# Patient Record
Sex: MALE | Race: WHITE | NOT HISPANIC OR LATINO | Employment: OTHER | URBAN - METROPOLITAN AREA
[De-identification: names, ages, dates, MRNs, and addresses within clinical notes are randomized per-mention and may not be internally consistent; named-entity substitution may affect disease eponyms.]

---

## 2017-01-06 ENCOUNTER — ALLSCRIPTS OFFICE VISIT (OUTPATIENT)
Dept: OTHER | Facility: OTHER | Age: 57
End: 2017-01-06

## 2017-08-18 ENCOUNTER — ALLSCRIPTS OFFICE VISIT (OUTPATIENT)
Dept: OTHER | Facility: OTHER | Age: 57
End: 2017-08-18

## 2017-08-18 DIAGNOSIS — R10.9 ABDOMINAL PAIN: ICD-10-CM

## 2017-08-18 DIAGNOSIS — I10 ESSENTIAL (PRIMARY) HYPERTENSION: ICD-10-CM

## 2017-08-21 ENCOUNTER — GENERIC CONVERSION - ENCOUNTER (OUTPATIENT)
Dept: OTHER | Facility: OTHER | Age: 57
End: 2017-08-21

## 2017-08-22 ENCOUNTER — GENERIC CONVERSION - ENCOUNTER (OUTPATIENT)
Dept: OTHER | Facility: OTHER | Age: 57
End: 2017-08-22

## 2017-08-24 ENCOUNTER — ALLSCRIPTS OFFICE VISIT (OUTPATIENT)
Dept: OTHER | Facility: OTHER | Age: 57
End: 2017-08-24

## 2017-08-29 ENCOUNTER — APPOINTMENT (OUTPATIENT)
Dept: LAB | Facility: HOSPITAL | Age: 57
End: 2017-08-29
Payer: COMMERCIAL

## 2017-08-29 ENCOUNTER — GENERIC CONVERSION - ENCOUNTER (OUTPATIENT)
Dept: OTHER | Facility: OTHER | Age: 57
End: 2017-08-29

## 2017-08-29 DIAGNOSIS — I10 ESSENTIAL (PRIMARY) HYPERTENSION: ICD-10-CM

## 2017-08-29 LAB
ALBUMIN SERPL BCP-MCNC: 3.8 G/DL (ref 3.5–5)
ALP SERPL-CCNC: 58 U/L (ref 46–116)
ALT SERPL W P-5'-P-CCNC: 44 U/L (ref 12–78)
ANION GAP SERPL CALCULATED.3IONS-SCNC: 8 MMOL/L (ref 4–13)
AST SERPL W P-5'-P-CCNC: 28 U/L (ref 5–45)
BASOPHILS # BLD AUTO: 0.03 THOUSANDS/ΜL (ref 0–0.1)
BASOPHILS NFR BLD AUTO: 1 % (ref 0–1)
BILIRUB SERPL-MCNC: 0.62 MG/DL (ref 0.2–1)
BUN SERPL-MCNC: 23 MG/DL (ref 5–25)
CALCIUM SERPL-MCNC: 8.5 MG/DL (ref 8.3–10.1)
CHLORIDE SERPL-SCNC: 104 MMOL/L (ref 100–108)
CHOLEST SERPL-MCNC: 206 MG/DL (ref 50–200)
CO2 SERPL-SCNC: 32 MMOL/L (ref 21–32)
CREAT SERPL-MCNC: 1.16 MG/DL (ref 0.6–1.3)
EOSINOPHIL # BLD AUTO: 0.13 THOUSAND/ΜL (ref 0–0.61)
EOSINOPHIL NFR BLD AUTO: 2 % (ref 0–6)
ERYTHROCYTE [DISTWIDTH] IN BLOOD BY AUTOMATED COUNT: 13.4 % (ref 11.6–15.1)
GFR SERPL CREATININE-BSD FRML MDRD: 69 ML/MIN/1.73SQ M
GLUCOSE P FAST SERPL-MCNC: 99 MG/DL (ref 65–99)
HCT VFR BLD AUTO: 45.1 % (ref 36.5–49.3)
HDLC SERPL-MCNC: 61 MG/DL (ref 40–60)
HGB BLD-MCNC: 15.6 G/DL (ref 12–17)
LDLC SERPL CALC-MCNC: 137 MG/DL (ref 0–100)
LYMPHOCYTES # BLD AUTO: 1.34 THOUSANDS/ΜL (ref 0.6–4.47)
LYMPHOCYTES NFR BLD AUTO: 20 % (ref 14–44)
MCH RBC QN AUTO: 31.8 PG (ref 26.8–34.3)
MCHC RBC AUTO-ENTMCNC: 34.6 G/DL (ref 31.4–37.4)
MCV RBC AUTO: 92 FL (ref 82–98)
MONOCYTES # BLD AUTO: 0.5 THOUSAND/ΜL (ref 0.17–1.22)
MONOCYTES NFR BLD AUTO: 8 % (ref 4–12)
NEUTROPHILS # BLD AUTO: 4.62 THOUSANDS/ΜL (ref 1.85–7.62)
NEUTS SEG NFR BLD AUTO: 69 % (ref 43–75)
NRBC BLD AUTO-RTO: 0 /100 WBCS
PLATELET # BLD AUTO: 173 THOUSANDS/UL (ref 149–390)
PMV BLD AUTO: 10.5 FL (ref 8.9–12.7)
POTASSIUM SERPL-SCNC: 4.5 MMOL/L (ref 3.5–5.3)
PROT SERPL-MCNC: 7.6 G/DL (ref 6.4–8.2)
RBC # BLD AUTO: 4.91 MILLION/UL (ref 3.88–5.62)
SODIUM SERPL-SCNC: 144 MMOL/L (ref 136–145)
TRIGL SERPL-MCNC: 40 MG/DL
TSH SERPL DL<=0.05 MIU/L-ACNC: 0.77 UIU/ML (ref 0.36–3.74)
WBC # BLD AUTO: 6.62 THOUSAND/UL (ref 4.31–10.16)

## 2017-08-29 PROCEDURE — 85025 COMPLETE CBC W/AUTO DIFF WBC: CPT

## 2017-08-29 PROCEDURE — 84443 ASSAY THYROID STIM HORMONE: CPT

## 2017-08-29 PROCEDURE — 80061 LIPID PANEL: CPT

## 2017-08-29 PROCEDURE — 36415 COLL VENOUS BLD VENIPUNCTURE: CPT

## 2017-08-29 PROCEDURE — 80053 COMPREHEN METABOLIC PANEL: CPT

## 2017-09-22 ENCOUNTER — GENERIC CONVERSION - ENCOUNTER (OUTPATIENT)
Dept: OTHER | Facility: OTHER | Age: 57
End: 2017-09-22

## 2017-09-22 DIAGNOSIS — R10.13 EPIGASTRIC PAIN: ICD-10-CM

## 2017-09-22 DIAGNOSIS — R10.9 ABDOMINAL PAIN: ICD-10-CM

## 2017-09-27 ENCOUNTER — ALLSCRIPTS OFFICE VISIT (OUTPATIENT)
Dept: OTHER | Facility: OTHER | Age: 57
End: 2017-09-27

## 2017-10-13 ENCOUNTER — GENERIC CONVERSION - ENCOUNTER (OUTPATIENT)
Dept: OTHER | Facility: OTHER | Age: 57
End: 2017-10-13

## 2017-10-13 ENCOUNTER — GENERIC CONVERSION - ENCOUNTER (OUTPATIENT)
Dept: FAMILY MEDICINE CLINIC | Facility: CLINIC | Age: 57
End: 2017-10-13

## 2017-10-27 NOTE — CONSULTS
Assessment  1  Family history of malignant neoplasm of stomach (V16 0) (Z80 0) : Mother   2  History of Dyspepsia (536 8) (R10 13)   3  Gallbladder polyp (575 6) (K82 4)   4  Colon cancer screening (V76 51) (Z12 11)   5  Dyspepsia (536 8) (R10 13)    Plan  Abdominal pain    · * US ABDOMEN COMPLETE; Status:Active; Requested for:26Jjt8507;    Perform:[facility]; CSI:15VLL6107; Last Updated By:Bayron Snyder; 2017 8:58:53 AM;Ordered; For:Abdominal pain; Ordered By:Sabrina Lujan; Abdominal pain, Dyspepsia    · Follow-up visit in 3 months Evaluation and Treatment  Follow-up  Status: Complete   Done: 14LWV3349   Ordered; For: Abdominal pain, Dyspepsia; Ordered By: Parvin Treviño Performed:  Due: 56NEO1083; Last Updated By: Caty Sol; 2017 9:10:15 AM  Abdominal pain, PMH: Dyspepsia    · (1) HELICOBACTER PYLORI ANTIGEN, STOOL; Status:Active; Requested  UBM:52JMR6451;    Perform:[facility]; CWL:25RGF8340; Ordered; For:Abdominal pain, PMH: Dyspepsia; Ordered By:Josh Hairston; Dyspepsia    · Dicyclomine HCl - 20 MG Oral Tablet; TAKE 1 TABLET EVERY 6 HOURS AS  NEEDED   Rx By: Ran Pak; Dispense: 30 Days ; #:120 Tablet; Refill: 5;For: Dyspepsia; VLAD = N; Verified Transmission to [facility] #0058 PMH: Dyspepsia    · (1) IGA; Status:Active; Requested TPW:33EOD5126;    Perform:[facility]; [id number]; Ordered;  West : Dyspepsia; Ordered By:Josh Hairston;   · (1) TISSUE TRANSGLUTAMINASE IGA; Status:Active; Requested AVJ:60PTZ3939;    Perform:[facility]; PFP:75LXF4572; Ordered;  West  St: Dyspepsia; Ordered By:Josh Hairston;     Discussion/Summary  Discussion Summary:   61year old male referred by his PCP  Viral gastroenteritis- most likely given symptoms resolved on their ownDyspepsia- without alarm symptoms, will check h pylori status as well as TTG and IgAColon Cancer screening - uptodate; repeat colonoscopy in Gallbladder polyp- repeat US in one year, if stability, then likely benign and no further workup neededProbable Gilbert's syndrome- which is benign  up in a few months time      Chief Complaint  Chief Complaint Free Text Note Form: Colon consult  Pt states no active symptoms  Referred by Dr Shanta Peguero  History of Present Illness  HPI: 61year old male referred by PCP  recently moved to a new place and reports a cough that progressed into explosive diarrhea with 4-5 times a day associated with blisters at the back of his tongue  any blood in his stools  to these symptoms at this new place, he moved out of this place to a new apartment  now says he has some central abdominal pain and fullness  Not really related to food  Happens while driving  used to race horses and feels like it   really having GERD symptoms  He is taking his zantac religiously  usually has a 1-2 BM's daily  does have a have a history of Bladder Cancer s/p resection  Review of Systems  Complete-Male GI Adult:   Constitutional: No fever or chills, feels well, no tiredness, no recent weight gain or weight loss  Eyes: No complaints of eye pain, no red eyes, no discharge from eyes, no itchy eyes  ENT: no complaints of earache, no hearing loss, no nosebleeds, no nasal discharge, no sore throat, no hoarseness  Cardiovascular: No complaints of slow heart rate, no fast heart rate, no chest pain, no palpitations, no leg claudication, no lower extremity  Respiratory: No complaints of shortness of breath, no wheezing, no cough, no SOB on exertion, no orthopnea or PND  Gastrointestinal: as noted in HPI  Genitourinary: No complaints of dysuria, no incontinence, no hesitancy, no nocturia, no genital lesion, no testicular pain  Musculoskeletal: No complaints of arthralgia, no myalgias, no joint swelling or stiffness, no limb pain or swelling  Integumentary: No complaints of skin rash or skin lesions, no itching, no skin wound, no dry skin     Neurological: No compliants of headache, no confusion, no convulsions, no numbness or tingling, no dizziness or fainting, no limb weakness, no difficulty walking  Psychiatric: Is not suicidal, no sleep disturbances, no anxiety or depression, no change in personality, no emotional problems  Endocrine: No complaints of proptosis, no hot flashes, no muscle weakness, no erectile dysfunction, no deepening of the voice, no feelings of weakness  Hematologic/Lymphatic: No complaints of swollen glands, no swollen glands in the neck, does not bleed easily, no easy bruising  ROS Reviewed:   ROS reviewed  Active Problems  1  Abdominal pain (789 00) (R10 9)   2  Actinic keratosis (702 0) (L57 0)   3  Arthritis (716 90) (M19 90)   4  Benign nodular prostatic hyperplasia with lower urinary tract symptoms (600 10) (N40 1)   5  Bilirubinemia (782 4) (E80 6)   6  Bladder cancer (188 9) (C67 9)   7  Chest pain (786 50) (R07 9)   8  Cough (786 2) (R05)   9  Diarrhea (787 91) (R19 7)   10  Encounter for colorectal cancer screening (V76 51) (Z12 11,Z12 12)   11  Footdrop (736 79) (M21 379)   12  Glossitis (529 0) (K14 0)   13  Hypertension (401 9) (I10)   14  Impacted cerumen of left ear (380 4) (H61 22)   15  Left non-suppurative otitis media (381 4) (H65 92)    Past Medical History  1  History of Dyspepsia (536 8) (R10 13)  Active Problems And Past Medical History Reviewed: The active problems and past medical history were reviewed and updated today  Surgical History  1  History of Back Surgery   2  History of Bladder Surgery  Surgical History Reviewed: The surgical history was reviewed and updated today  Family History  Mother    1  Family history of malignant neoplasm of stomach (V16 0) (Z80 0)   2  Family history of Raynaud phenomenon  Father    3  Family history of Heart attack (410 90) (I21 3)  Brother    4  Family history of Heart attack (410 90) (I21 3)  Family History Reviewed: The family history was reviewed and updated today  Social History   · Always uses seat belt   ·    · Employed   · Full-time employment   · Has 1 child   · Denominational   · Lives in apartment   · Living alone (V60 3) (Z60 2)   · Never a smoker   · No advance directives (V49 89) (Z78 9)   · No alcohol use   · No history of alcohol use (Z78 9)   · No living will   · One child   · Single   · Supportive and safe  Social History Reviewed: The social history was reviewed and updated today  Current Meds   1  AmLODIPine Besylate 2 5 MG Oral Tablet; TAKE 1 TABLET DAILY; Therapy: 78AGV8462 to (Evaluate:84Kdh6761)  Requested for: 45YNL2007; Last   Rx:86Bks0635 Ordered   2  Flomax 0 4 MG Oral Capsule; take 1 capsule daily Recorded   3  RaNITidine HCl - 150 MG Oral Tablet; take 1 tablet by mouth twice a day; Therapy: 60UHF4805 to 077 0252 9144)  Requested for: 19HDW8569; Last   Rx:31Evp0786 Ordered  Medication List Reviewed: The medication list was reviewed and updated today  Allergies  1  No Known Drug Allergies    Vitals  Vital Signs    Recorded: 10YMW5789 07:53AM   Temperature 96 4 F, Tympanic   Heart Rate 67   Systolic 433, LUE, Sitting   Diastolic 82, LUE, Sitting   Height 6 ft 3 in   Weight 186 lb    BMI Calculated 23 25   BSA Calculated 2 13   O2 Saturation 99, RA     Physical Exam    Constitutional   General appearance: No acute distress, well appearing and well nourished  Eyes   Conjunctiva and lids: No swelling, erythema, or discharge  Pupils and irises: Equal, round and reactive to light  Ears, Nose, Mouth, and Throat   External inspection of ears and nose: Normal     Otoscopic examination: Tympanic membrance translucent with normal light reflex  Canals patent without erythema  Nasal mucosa, septum, and turbinates: Normal without edema or erythema  Oropharynx: Normal with no erythema, edema, exudate or lesions  Pulmonary   Respiratory effort: No increased work of breathing or signs of respiratory distress      Auscultation of lungs: Clear to auscultation, equal breath sounds bilaterally, no wheezes, no rales, no rhonci  Cardiovascular   Palpation of heart: Normal PMI, no thrills  Auscultation of heart: Normal rate and rhythm, normal S1 and S2, without murmurs  Examination of extremities for edema and/or varicosities: Normal     Carotid pulses: Normal     Abdomen   Abdomen: Non-tender, no masses  Liver and spleen: No hepatomegaly or splenomegaly  Lymphatic   Palpation of lymph nodes in neck: No lymphadenopathy  Musculoskeletal   Gait and station: Normal     Digits and nails: Normal without clubbing or cyanosis  Inspection/palpation of joints, bones, and muscles: Normal     Skin   Skin and subcutaneous tissue: Normal without rashes or lesions  Neurologic   Cranial nerves: Cranial nerves 2-12 intact  Reflexes: 2+ and symmetric  Sensation: No sensory loss      Psychiatric   Orientation to person, place and time: Normal     Mood and affect: Normal          Future Appointments    Date/Time Provider Specialty Site   12/07/2017 08:00 AM Nydia aSlgado [facility]   11/20/2017 04:15 PM Gumaro Ojeda MD Family Medicine [facility] [address]     Signatures   Electronically signed by : Pancho Naidu DO; Sep 27 2017 12:47PM EST                       (Author)

## 2018-01-12 VITALS
WEIGHT: 186 LBS | HEART RATE: 67 BPM | TEMPERATURE: 96.4 F | BODY MASS INDEX: 23.13 KG/M2 | OXYGEN SATURATION: 99 % | DIASTOLIC BLOOD PRESSURE: 82 MMHG | SYSTOLIC BLOOD PRESSURE: 128 MMHG | HEIGHT: 75 IN

## 2018-01-13 VITALS
BODY MASS INDEX: 21.69 KG/M2 | WEIGHT: 174.4 LBS | HEIGHT: 75 IN | SYSTOLIC BLOOD PRESSURE: 142 MMHG | DIASTOLIC BLOOD PRESSURE: 90 MMHG

## 2018-01-14 VITALS
TEMPERATURE: 97.6 F | DIASTOLIC BLOOD PRESSURE: 98 MMHG | HEIGHT: 75 IN | SYSTOLIC BLOOD PRESSURE: 150 MMHG | BODY MASS INDEX: 23.05 KG/M2 | WEIGHT: 185.38 LBS | RESPIRATION RATE: 16 BRPM | HEART RATE: 88 BPM

## 2018-01-15 VITALS
HEIGHT: 75 IN | SYSTOLIC BLOOD PRESSURE: 150 MMHG | TEMPERATURE: 98.2 F | WEIGHT: 184.38 LBS | DIASTOLIC BLOOD PRESSURE: 86 MMHG | HEART RATE: 76 BPM | RESPIRATION RATE: 18 BRPM | BODY MASS INDEX: 22.92 KG/M2 | OXYGEN SATURATION: 96 %

## 2018-01-18 NOTE — RESULT NOTES
Verified Results  (1) CBC/PLT/DIFF 61Dmm6664 08:04AM Amauri Guidry    Order Number: [id number]     Test Name Result Flag Reference   WBC COUNT 6 62 Thousand/uL  4 31-10 16   RBC COUNT 4 91 Million/uL  3 88-5 62   HEMOGLOBIN 15 6 g/dL  12 0-17 0   HEMATOCRIT 45 1 %  36 5-49 3   MCV 92 fL  82-98   MCH 31 8 pg  26 8-34 3   MCHC 34 6 g/dL  31 4-37 4   RDW 13 4 %  11 6-15 1   MPV 10 5 fL  8 9-12 7   PLATELET COUNT 948 Thousands/uL  149-390   nRBC AUTOMATED 0 /100 WBCs     NEUTROPHILS RELATIVE PERCENT 69 %  43-75   LYMPHOCYTES RELATIVE PERCENT 20 %  14-44   MONOCYTES RELATIVE PERCENT 8 %  4-12   EOSINOPHILS RELATIVE PERCENT 2 %  0-6   BASOPHILS RELATIVE PERCENT 1 %  0-1   NEUTROPHILS ABSOLUTE COUNT 4 62 Thousands/? ??L  1 85-7 62   LYMPHOCYTES ABSOLUTE COUNT 1 34 Thousands/? ??L  0 60-4 47   MONOCYTES ABSOLUTE COUNT 0 50 Thousand/? ??L  0 17-1 22   EOSINOPHILS ABSOLUTE COUNT 0 13 Thousand/? ??L  0 00-0 61   BASOPHILS ABSOLUTE COUNT 0 03 Thousands/? ??L  0 00-0 10     (1) COMPREHENSIVE METABOLIC PANEL 55VQA0502 76:51JP Amauri Guidry    Order Number: [id number]     Test Name Result Flag Reference   SODIUM 144 mmol/L  136-145   POTASSIUM 4 5 mmol/L  3 5-5 3   CHLORIDE 104 mmol/L  100-108   CARBON DIOXIDE 32 mmol/L  21-32   ANION GAP (CALC) 8 mmol/L  4-13   BLOOD UREA NITROGEN 23 mg/dL  5-25   CREATININE 1 16 mg/dL  0 60-1 30   Standardized to IDMS reference method   CALCIUM 8 5 mg/dL  8 3-10 1   BILI, TOTAL 0 62 mg/dL  0 20-1 00   ALK PHOSPHATAS 58 U/L     ALT (SGPT) 44 U/L  12-78   Specimen collection should occur prior to Sulfasalazine administration due to the potential for falsely depressed results  AST(SGOT) 28 U/L  5-45   Specimen collection should occur prior to Sulfasalazine administration due to the potential for falsely depressed results     ALBUMIN 3 8 g/dL  3 5-5 0   TOTAL PROTEIN 7 6 g/dL  6 4-8 2   eGFR 69 ml/min/1 73sq m     [employer] Education [employer] recommendations are as follows:  GFR calculation is accurate only with a steady state creatinine  Chronic Kidney disease less than 60 ml/min/1 73 sq  meters  Kidney failure less than 15 ml/min/1 73 sq  meters  GLUCOSE FASTING 99 mg/dL  65-99   Specimen collection should occur prior to Sulfasalazine administration due to the potential for falsely depressed results  Specimen collection should occur prior to Sulfapyridine administration due to the potential for falsely elevated results  (1) LIPID PANEL FASTING W DIRECT LDL REFLEX 15Mrz7849 08:04AM Rebekah Careerflo   TW Order Number: [id number]     Test Name Result Flag Reference   CHOLESTEROL 206 mg/dL H    LDL CHOLESTEROL CALCULATED 137 mg/dL H 0-100   Triglyceride:        Normal ??? ??? ??? ??? ??? ??? ??? <150 mg/dl   ??? ??? ???Borderline High ??? ??? 150-199 mg/dl   ??? ??? ? ?? High ??? ??? ??? ??? ??? ??? ??? 200-499 mg/dl   ??? ??? ? ??Very High ??? ??? ??? ??? ??? >499 mg/dl      Cholesterol:       Desirable ??? ??? ??? ??? <200 mg/dl   ??? ??? Borderline High ??? 200-239 mg/dl   ??? ??? High ??? ??? ??? ??? ??? ??? >239 mg/dl      HDL Cholesterol:       High ??? ???>59 mg/dL   ??? ??? Low ??? ??? <41 mg/dL      HDL Cholesterol:       High ??? ???>59 mg/dL   ??? ??? Low ??? ??? <41 mg/dL      This screening LDL is a calculated result  It does not have the accuracy of the Direct Measured LDL in the monitoring of patients with hyperlipidemia and/or statin therapy  Direct Measure LDL (BFR969) must be ordered separately in these patients  TRIGLYCERIDES 40 mg/dL  <=150   Specimen collection should occur prior to N-Acetylcysteine or Metamizole administration due to the potential for falsely depressed results  HDL,DIRECT 61 mg/dL H 40-60   Specimen collection should occur prior to Metamizole administration due to the potential for falsley depressed results       (1) TSH 27Mfa6806 08:04AM Lopez Maxim Order Number: [id number]     Test Name Result Flag Reference   TSH 0 768 uIU/mL  0 358-3 740   Patients undergoing fluorescein dye angiography may retain small amounts of fluorescein in the body for 48-72 hours post procedure  Samples containing fluorescein can produce falsely depressed TSH values  If the patient had this procedure,a specimen should be resubmitted post fluorescein clearance

## 2018-01-22 VITALS
HEART RATE: 66 BPM | RESPIRATION RATE: 18 BRPM | SYSTOLIC BLOOD PRESSURE: 148 MMHG | WEIGHT: 186.5 LBS | TEMPERATURE: 97.9 F | DIASTOLIC BLOOD PRESSURE: 98 MMHG | HEIGHT: 75 IN | BODY MASS INDEX: 23.19 KG/M2

## 2018-01-22 VITALS
WEIGHT: 188.38 LBS | RESPIRATION RATE: 16 BRPM | BODY MASS INDEX: 23.42 KG/M2 | DIASTOLIC BLOOD PRESSURE: 90 MMHG | HEIGHT: 75 IN | TEMPERATURE: 97.6 F | HEART RATE: 78 BPM | SYSTOLIC BLOOD PRESSURE: 142 MMHG

## 2018-01-22 VITALS — DIASTOLIC BLOOD PRESSURE: 84 MMHG | SYSTOLIC BLOOD PRESSURE: 134 MMHG

## 2018-04-03 ENCOUNTER — TELEPHONE (OUTPATIENT)
Dept: FAMILY MEDICINE CLINIC | Facility: CLINIC | Age: 58
End: 2018-04-03

## 2018-04-03 DIAGNOSIS — I10 ESSENTIAL HYPERTENSION: Primary | ICD-10-CM

## 2018-04-03 RX ORDER — AMLODIPINE BESYLATE 2.5 MG/1
1 TABLET ORAL DAILY
COMMUNITY
Start: 2017-08-18 | End: 2018-04-03 | Stop reason: SDUPTHER

## 2018-04-03 RX ORDER — AMLODIPINE BESYLATE 2.5 MG/1
2.5 TABLET ORAL DAILY
Qty: 30 TABLET | Refills: 0 | Status: SHIPPED | OUTPATIENT
Start: 2018-04-03 | End: 2018-05-03 | Stop reason: SDUPTHER

## 2018-04-06 ENCOUNTER — OFFICE VISIT (OUTPATIENT)
Dept: FAMILY MEDICINE CLINIC | Facility: CLINIC | Age: 58
End: 2018-04-06

## 2018-04-06 VITALS
DIASTOLIC BLOOD PRESSURE: 98 MMHG | RESPIRATION RATE: 20 BRPM | WEIGHT: 190.38 LBS | HEART RATE: 72 BPM | HEIGHT: 73 IN | SYSTOLIC BLOOD PRESSURE: 150 MMHG | TEMPERATURE: 98.1 F | BODY MASS INDEX: 25.23 KG/M2

## 2018-04-06 DIAGNOSIS — I10 ESSENTIAL HYPERTENSION: Primary | ICD-10-CM

## 2018-04-06 RX ORDER — TAMSULOSIN HYDROCHLORIDE 0.4 MG/1
1 CAPSULE ORAL DAILY
COMMUNITY
End: 2022-06-09 | Stop reason: SDUPTHER

## 2018-04-06 RX ORDER — RANITIDINE 150 MG/1
1 TABLET ORAL 2 TIMES DAILY
COMMUNITY
Start: 2017-09-22 | End: 2019-04-12

## 2018-04-06 RX ORDER — CIPROFLOXACIN 500 MG/1
500 TABLET, FILM COATED ORAL 2 TIMES DAILY
Refills: 1 | COMMUNITY
Start: 2018-03-19 | End: 2018-12-14 | Stop reason: HOSPADM

## 2018-04-06 RX ORDER — VALACYCLOVIR HYDROCHLORIDE 500 MG/1
500 TABLET, FILM COATED ORAL DAILY
Refills: 3 | COMMUNITY
Start: 2018-02-03 | End: 2021-04-07 | Stop reason: SDUPTHER

## 2018-04-09 PROBLEM — I10 HYPERTENSION: Status: ACTIVE | Noted: 2017-01-06

## 2018-05-03 DIAGNOSIS — I10 ESSENTIAL HYPERTENSION: ICD-10-CM

## 2018-05-03 RX ORDER — AMLODIPINE BESYLATE 2.5 MG/1
TABLET ORAL
Qty: 30 TABLET | Refills: 0 | Status: SHIPPED | OUTPATIENT
Start: 2018-05-03 | End: 2018-05-18 | Stop reason: SDUPTHER

## 2018-05-08 ENCOUNTER — APPOINTMENT (OUTPATIENT)
Dept: LAB | Facility: HOSPITAL | Age: 58
End: 2018-05-08
Payer: COMMERCIAL

## 2018-05-08 ENCOUNTER — TRANSCRIBE ORDERS (OUTPATIENT)
Dept: ADMINISTRATIVE | Facility: HOSPITAL | Age: 58
End: 2018-05-08

## 2018-05-08 DIAGNOSIS — R97.20 ELEVATED PROSTATE SPECIFIC ANTIGEN (PSA): Primary | ICD-10-CM

## 2018-05-08 DIAGNOSIS — R97.20 ELEVATED PROSTATE SPECIFIC ANTIGEN (PSA): ICD-10-CM

## 2018-05-08 LAB — PSA SERPL-MCNC: 1.4 NG/ML (ref 0–4)

## 2018-05-08 PROCEDURE — 84153 ASSAY OF PSA TOTAL: CPT

## 2018-05-18 ENCOUNTER — OFFICE VISIT (OUTPATIENT)
Dept: FAMILY MEDICINE CLINIC | Facility: CLINIC | Age: 58
End: 2018-05-18
Payer: COMMERCIAL

## 2018-05-18 VITALS
RESPIRATION RATE: 18 BRPM | WEIGHT: 190.13 LBS | BODY MASS INDEX: 25.2 KG/M2 | HEART RATE: 60 BPM | HEIGHT: 73 IN | SYSTOLIC BLOOD PRESSURE: 152 MMHG | TEMPERATURE: 97.9 F | DIASTOLIC BLOOD PRESSURE: 108 MMHG

## 2018-05-18 DIAGNOSIS — M62.830 LUMBAR PARASPINAL MUSCLE SPASM: ICD-10-CM

## 2018-05-18 DIAGNOSIS — M21.372 LEFT FOOT DROP: ICD-10-CM

## 2018-05-18 DIAGNOSIS — B35.6 TINEA CRURIS: Primary | ICD-10-CM

## 2018-05-18 DIAGNOSIS — B35.6 TINEA CRURIS: ICD-10-CM

## 2018-05-18 DIAGNOSIS — I10 ESSENTIAL HYPERTENSION: Primary | ICD-10-CM

## 2018-05-18 PROBLEM — E80.4 GILBERTS SYNDROME: Status: ACTIVE | Noted: 2017-09-27

## 2018-05-18 PROCEDURE — 99214 OFFICE O/P EST MOD 30 MIN: CPT | Performed by: FAMILY MEDICINE

## 2018-05-18 PROCEDURE — 3008F BODY MASS INDEX DOCD: CPT | Performed by: FAMILY MEDICINE

## 2018-05-18 PROCEDURE — 1036F TOBACCO NON-USER: CPT | Performed by: FAMILY MEDICINE

## 2018-05-18 RX ORDER — NYSTATIN 500000 [USP'U]/1
1 TABLET, COATED ORAL EVERY 8 HOURS SCHEDULED
Qty: 30 TABLET | Refills: 0 | Status: SHIPPED | OUTPATIENT
Start: 2018-05-18 | End: 2018-05-28

## 2018-05-18 RX ORDER — NYSTATIN 100000 U/G
CREAM TOPICAL 2 TIMES DAILY
Qty: 30 G | Refills: 0 | Status: SHIPPED | OUTPATIENT
Start: 2018-05-18 | End: 2018-05-18 | Stop reason: CLARIF

## 2018-05-18 RX ORDER — AMLODIPINE BESYLATE 2.5 MG/1
2.5 TABLET ORAL DAILY
Qty: 30 TABLET | Refills: 5 | Status: SHIPPED | OUTPATIENT
Start: 2018-05-18 | End: 2019-10-28 | Stop reason: SDUPTHER

## 2018-05-18 NOTE — PATIENT INSTRUCTIONS
Refill meds, lab due 8/18   Can take robaxin-check expiration date,check with pharmacy with price  Of tetanus shot

## 2018-05-18 NOTE — PROGRESS NOTES
Assessment/Plan:    No problem-specific Assessment & Plan notes found for this encounter  There are no diagnoses linked to this encounter  Subjective:      Patient ID: Tariq Monsivaiser is a 60 y o  male  Hypertension   This is a chronic problem  The current episode started more than 1 year ago  The problem is unchanged  The problem is uncontrolled  Pertinent negatives include no anxiety, blurred vision, chest pain, headaches, palpitations, peripheral edema or shortness of breath  There are no associated agents to hypertension  Risk factors for coronary artery disease include male gender  Past treatments include calcium channel blockers  The current treatment provides moderate improvement  There are no compliance problems  The following portions of the patient's history were reviewed and updated as appropriate: allergies, current medications, past family history, past medical history, past social history, past surgical history and problem list     Review of Systems   Constitutional: Negative for activity change and unexpected weight change  Eyes: Negative for blurred vision  Respiratory: Negative for shortness of breath  Cardiovascular: Negative for chest pain and palpitations  Musculoskeletal: Positive for back pain  Neurological: Negative for headaches  Psychiatric/Behavioral: The patient is not nervous/anxious  Objective:      BP (!) 152/108 (BP Location: Left arm, Patient Position: Sitting, Cuff Size: Standard)   Pulse 60   Temp 97 9 °F (36 6 °C) (Temporal)   Resp 18   Ht 6' 0 75" (1 848 m)   Wt 86 2 kg (190 lb 2 oz)   BMI 25 26 kg/m²          Physical Exam   Constitutional: He appears well-developed and well-nourished  Neck: No thyromegaly present  Cardiovascular: Normal rate, regular rhythm and normal heart sounds  Pulmonary/Chest: Breath sounds normal    Musculoskeletal: He exhibits tenderness  He exhibits no edema     Back with spasm   Lymphadenopathy: He has no cervical adenopathy

## 2018-06-08 ENCOUNTER — TELEPHONE (OUTPATIENT)
Dept: FAMILY MEDICINE CLINIC | Facility: CLINIC | Age: 58
End: 2018-06-08

## 2018-06-08 NOTE — TELEPHONE ENCOUNTER
Left message for pt to call us back  Was he able to schedule w/ podiatry or does he not wish to follow up w/ specialist at this time? Regarding referral wq

## 2018-09-25 ENCOUNTER — TRANSCRIBE ORDERS (OUTPATIENT)
Dept: ADMINISTRATIVE | Facility: HOSPITAL | Age: 58
End: 2018-09-25

## 2018-09-25 DIAGNOSIS — R10.12 LEFT UPPER QUADRANT PAIN: Primary | ICD-10-CM

## 2018-10-01 ENCOUNTER — HOSPITAL ENCOUNTER (OUTPATIENT)
Dept: RADIOLOGY | Age: 58
Discharge: HOME | End: 2018-10-01

## 2018-10-01 DIAGNOSIS — R10.12 LEFT UPPER QUADRANT PAIN: ICD-10-CM

## 2018-10-03 ENCOUNTER — HOSPITAL ENCOUNTER (OUTPATIENT)
Dept: RADIOLOGY | Age: 58
Discharge: HOME | End: 2018-10-03
Payer: COMMERCIAL

## 2018-10-03 DIAGNOSIS — R10.12 LEFT UPPER QUADRANT PAIN: ICD-10-CM

## 2018-10-03 PROCEDURE — 76700 US EXAM ABDOM COMPLETE: CPT

## 2018-12-10 ENCOUNTER — TELEPHONE (OUTPATIENT)
Dept: FAMILY MEDICINE CLINIC | Facility: CLINIC | Age: 58
End: 2018-12-10

## 2018-12-10 NOTE — TELEPHONE ENCOUNTER
Patient called in stating he has a dropped foot for the last 4-5 days  He called a neuro surgeon and they suggested to call his family doc  He would like an MRI order place asap

## 2018-12-11 ENCOUNTER — OFFICE VISIT (OUTPATIENT)
Dept: FAMILY MEDICINE CLINIC | Facility: CLINIC | Age: 58
End: 2018-12-11
Payer: COMMERCIAL

## 2018-12-11 VITALS
RESPIRATION RATE: 16 BRPM | HEART RATE: 73 BPM | BODY MASS INDEX: 24.38 KG/M2 | DIASTOLIC BLOOD PRESSURE: 86 MMHG | SYSTOLIC BLOOD PRESSURE: 142 MMHG | OXYGEN SATURATION: 97 % | WEIGHT: 180 LBS | TEMPERATURE: 98.3 F | HEIGHT: 72 IN

## 2018-12-11 DIAGNOSIS — M21.371 RIGHT FOOT DROP: Primary | ICD-10-CM

## 2018-12-11 PROCEDURE — 99213 OFFICE O/P EST LOW 20 MIN: CPT | Performed by: NURSE PRACTITIONER

## 2018-12-11 PROCEDURE — 3008F BODY MASS INDEX DOCD: CPT | Performed by: NURSE PRACTITIONER

## 2018-12-11 RX ORDER — KETOCONAZOLE 20 MG/G
CREAM TOPICAL
Refills: 3 | COMMUNITY
Start: 2018-11-27 | End: 2019-04-12

## 2018-12-11 RX ORDER — CLOTRIMAZOLE AND BETAMETHASONE DIPROPIONATE 10; .64 MG/G; MG/G
CREAM TOPICAL
Refills: 1 | COMMUNITY
Start: 2018-11-17 | End: 2019-04-12

## 2018-12-11 RX ORDER — BETAMETHASONE DIPROPIONATE 0.5 MG/G
1 LOTION TOPICAL 2 TIMES DAILY
Refills: 0 | COMMUNITY
Start: 2018-11-02 | End: 2019-04-12

## 2018-12-11 RX ORDER — OMEPRAZOLE 20 MG/1
CAPSULE, DELAYED RELEASE ORAL
Refills: 0 | COMMUNITY
Start: 2018-10-03 | End: 2019-04-12

## 2018-12-11 NOTE — PROGRESS NOTES
Chief Complaint   Patient presents with    Drop Foot     Pt has hx of drop foot R food drop  Pt states x4-5 days Requesting referral to MRI       Assessment/Plan:       Diagnoses and all orders for this visit:    Right foot drop  -     MRI hip right wo contrast; Future    Other orders  -     Cancel: PREFERRED: influenza vaccine, [id number], quadrivalent, recombinant, PF, 0 5 mL, for patients 18 yr+ (FLUBLOK)  -     betamethasone dipropionate 0 05 % lotion; Apply 1 application topically 2 (two) times a day To affected area  -     clotrimazole-betamethasone (LOTRISONE) 1-0 05 % cream; Use as directed  -     PROCTOSOL HC 2 5 % rectal cream; MIX WITH KETOCONAZOLE AND APPLY TO AFFECTED AREAS IN SKIN FOLDS TWICE DAILY FOR 2 WEEKS  -     ketoconazole (NIZORAL) 2 % cream; APPLY TO AFFECTED AREA TWICE A DAY TO RASH IN SKIN FOLDS  -     omeprazole (PriLOSEC) 20 mg delayed release capsule; TAKE 1 CAPSULE (20 MG) BY MOUTH DAILY 30 MINUTES BEFORE MORNING MEAL          Subjective:      Patient ID: Sidney Caraballo is a 60 y o  male  States his car broke down sat in police car for warmth  Felt pull in his back and then after that right foot dropped and not working properly  Has history of left foot drop  Dr Luis Bishop did original surgery in [place]  He having mostly hip flexion weakness make difficult to perform right foot plantar flexion  The following portions of the patient's history were reviewed and updated as appropriate: allergies, current medications, past family history, past medical history, past social history, past surgical history and problem list     Review of Systems   Constitutional: Positive for activity change  Respiratory: Negative for chest tightness and shortness of breath  Musculoskeletal: Positive for arthralgias (right leg; posterior knee), gait problem and myalgias  Neurological: Positive for weakness  Negative for numbness           Objective:      /86 (BP Location: Left arm, Patient Position: Sitting, Cuff Size: Large)   Pulse 73   Temp 98 3 °F (36 8 °C) (Temporal)   Resp 16   Ht 6' (1 829 m)   Wt 81 6 kg (180 lb)   SpO2 97%   BMI 24 41 kg/m²          Physical Exam   Constitutional: He is oriented to person, place, and time  Vital signs are normal  He appears well-developed and well-nourished  He does not appear ill  HENT:   Head: Normocephalic and atraumatic  Eyes: Pupils are equal    Cardiovascular: Normal rate, regular rhythm, S1 normal and S2 normal     No murmur heard  Pulmonary/Chest: Effort normal and breath sounds normal  No respiratory distress  Neurological: He is alert and oriented to person, place, and time  He has normal strength  A sensory deficit (right great toe) is present  He displays no Babinski's sign on the right side  Reflex Scores:       Patellar reflexes are 0 on the right side and 1+ on the left side  Psychiatric: He has a normal mood and affect   Thought content normal

## 2018-12-12 ENCOUNTER — HOSPITAL ENCOUNTER (INPATIENT)
Facility: HOSPITAL | Age: 58
LOS: 2 days | Discharge: HOME | DRG: 552 | End: 2018-12-14
Attending: EMERGENCY MEDICINE | Admitting: INTERNAL MEDICINE
Payer: COMMERCIAL

## 2018-12-12 ENCOUNTER — APPOINTMENT (EMERGENCY)
Dept: RADIOLOGY | Facility: HOSPITAL | Age: 58
DRG: 552 | End: 2018-12-12
Payer: COMMERCIAL

## 2018-12-12 DIAGNOSIS — G95.20 SPINAL CORD COMPRESSION (HCC): ICD-10-CM

## 2018-12-12 DIAGNOSIS — M51.26 LUMBAR DISC HERNIATION: ICD-10-CM

## 2018-12-12 DIAGNOSIS — M21.371 RIGHT FOOT DROP: ICD-10-CM

## 2018-12-12 DIAGNOSIS — M21.372 LEFT FOOT DROP: Primary | ICD-10-CM

## 2018-12-12 PROCEDURE — 99223 1ST HOSP IP/OBS HIGH 75: CPT | Performed by: INTERNAL MEDICINE

## 2018-12-12 PROCEDURE — A9585 GADOBUTROL INJECTION: HCPCS | Performed by: EMERGENCY MEDICINE

## 2018-12-12 PROCEDURE — 72158 MRI LUMBAR SPINE W/O & W/DYE: CPT

## 2018-12-12 PROCEDURE — 99284 EMERGENCY DEPT VISIT MOD MDM: CPT

## 2018-12-12 RX ORDER — ACETAMINOPHEN 325 MG/1
650 TABLET ORAL EVERY 6 HOURS PRN
Status: DISCONTINUED | OUTPATIENT
Start: 2018-12-12 | End: 2018-12-14 | Stop reason: HOSPADM

## 2018-12-12 RX ORDER — ONDANSETRON 2 MG/ML
4 INJECTION INTRAMUSCULAR; INTRAVENOUS EVERY 6 HOURS PRN
Status: DISCONTINUED | OUTPATIENT
Start: 2018-12-12 | End: 2018-12-14 | Stop reason: HOSPADM

## 2018-12-12 RX ORDER — CLOTRIMAZOLE AND BETAMETHASONE DIPROPIONATE 10; .64 MG/G; MG/G
CREAM TOPICAL 2 TIMES DAILY
Status: DISCONTINUED | OUTPATIENT
Start: 2018-12-13 | End: 2018-12-14 | Stop reason: HOSPADM

## 2018-12-12 RX ORDER — FAMOTIDINE 20 MG/1
20 TABLET, FILM COATED ORAL 2 TIMES DAILY
Status: DISCONTINUED | OUTPATIENT
Start: 2018-12-13 | End: 2018-12-14 | Stop reason: HOSPADM

## 2018-12-12 RX ORDER — PANTOPRAZOLE SODIUM 40 MG/1
40 TABLET, DELAYED RELEASE ORAL
Status: DISCONTINUED | OUTPATIENT
Start: 2018-12-13 | End: 2018-12-14 | Stop reason: HOSPADM

## 2018-12-12 RX ORDER — TAMSULOSIN HYDROCHLORIDE 0.4 MG/1
0.4 CAPSULE ORAL
Status: DISCONTINUED | OUTPATIENT
Start: 2018-12-12 | End: 2018-12-13

## 2018-12-12 RX ORDER — DEXAMETHASONE SODIUM PHOSPHATE 4 MG/ML
4 INJECTION, SOLUTION INTRA-ARTICULAR; INTRALESIONAL; INTRAMUSCULAR; INTRAVENOUS; SOFT TISSUE EVERY 8 HOURS SCHEDULED
Status: DISCONTINUED | OUTPATIENT
Start: 2018-12-12 | End: 2018-12-14 | Stop reason: HOSPADM

## 2018-12-12 RX ORDER — AMLODIPINE BESYLATE 2.5 MG/1
2.5 TABLET ORAL DAILY
Status: DISCONTINUED | OUTPATIENT
Start: 2018-12-13 | End: 2018-12-14 | Stop reason: HOSPADM

## 2018-12-12 RX ORDER — VALACYCLOVIR HYDROCHLORIDE 500 MG/1
500 TABLET, FILM COATED ORAL 2 TIMES DAILY
Status: DISCONTINUED | OUTPATIENT
Start: 2018-12-13 | End: 2018-12-14 | Stop reason: HOSPADM

## 2018-12-12 RX ADMIN — GADOBUTROL 8 ML: 604.72 INJECTION INTRAVENOUS at 20:05

## 2018-12-12 RX ADMIN — DEXAMETHASONE SODIUM PHOSPHATE 4 MG: 4 INJECTION, SOLUTION INTRAMUSCULAR; INTRAVENOUS at 23:30

## 2018-12-12 RX ADMIN — TAMSULOSIN HYDROCHLORIDE 0.4 MG: 0.4 CAPSULE ORAL at 23:30

## 2018-12-12 NOTE — ED NOTES
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

## 2018-12-12 NOTE — DISCHARGE INSTRUCTIONS
Foot Drop   WHAT YOU NEED TO KNOW:   Foot drop is a nerve and muscle problem in your leg or ankle that prevents you from flexing or lifting your foot  Foot drop is most often caused by pressure on the nerve in your lower leg  It can also be caused by other kinds of nerve damage, muscle disease, or damage to the brain or spinal cord  Your foot drop may get better or it may be permanent  DISCHARGE INSTRUCTIONS:   Medicines:   · Pain medicine: You may be given medicine to take away or decrease pain  Do not wait until the pain is severe before you take your medicine  · Take your medicine as directed  Contact your healthcare provider if you think your medicine is not helping or if you have side effects  Tell him or her if you are allergic to any medicine  Keep a list of the medicines, vitamins, and herbs you take  Include the amounts, and when and why you take them  Bring the list or the pill bottles to follow-up visits  Carry your medicine list with you in case of an emergency  Manage foot drop:  Shift your body position often, and stretch your muscles daily  Make sure your seats are not too hard or too soft  Loosen bandages that feel too tight  Ask your healthcare provider about these and other ways to prevent foot drop  Reduce your risk of falling:   · Clear clutter, such as rugs or cords, from areas where you walk in your home  · You may be given an ankle brace to support your foot  This will make it easier to walk and help prevent you from falling  It will also help prevent you from dragging your foot  Use the brace whenever you walk, but take it off when lying or sitting down  Physical therapy:  A physical therapist teaches you exercises to help improve movement and strength, and to decrease pain  Follow up with your healthcare provider in 4 to 5 days:  Write down your questions so you remember to ask them during your visits    Contact your healthcare provider if:   · Your symptoms such as numbness, trouble walking, or dragging your toes get worse or do not go away  · You have more trouble walking, dressing, or playing sports  · You have questions or concerns about your condition or care  Return to the emergency department if:   · You fall and hurt yourself  · The numbness in your legs and feet spreads or worsens  · Your pain becomes severe  · You develop pain, redness, or swelling in your leg or foot  © 2017 [facility] Information is for End User's use only and may not be sold, redistributed or otherwise used for commercial purposes  All illustrations and images included in CareNotes® are the copyrighted property of A D A M [employer] Inc  or [employer]hesh [employer]  The above information is an  only  It is not intended as medical advice for individual conditions or treatments  Talk to your doctor, nurse or pharmacist before following any medical regimen to see if it is safe and effective for you

## 2018-12-13 ENCOUNTER — TRANSCRIBE ORDERS (OUTPATIENT)
Dept: NEUROSURGERY | Facility: CLINIC | Age: 58
End: 2018-12-13

## 2018-12-13 DIAGNOSIS — M21.379 FOOT DROP: Primary | ICD-10-CM

## 2018-12-13 LAB
ANION GAP SERPL CALCULATED.3IONS-SCNC: 6 MMOL/L (ref 4–13)
APTT PPP: 29 SECONDS (ref 26–38)
ATRIAL RATE: 73 BPM
BASOPHILS # BLD AUTO: 0.01 THOUSANDS/ΜL (ref 0–0.1)
BASOPHILS NFR BLD AUTO: 0 % (ref 0–1)
BILIRUB UR QL STRIP: NEGATIVE
BUN SERPL-MCNC: 21 MG/DL (ref 5–25)
CALCIUM SERPL-MCNC: 8.5 MG/DL (ref 8.3–10.1)
CHLORIDE SERPL-SCNC: 105 MMOL/L (ref 100–108)
CLARITY UR: CLEAR
CO2 SERPL-SCNC: 28 MMOL/L (ref 21–32)
COLOR UR: NORMAL
CREAT SERPL-MCNC: 1.14 MG/DL (ref 0.6–1.3)
EOSINOPHIL # BLD AUTO: 0.01 THOUSAND/ΜL (ref 0–0.61)
EOSINOPHIL NFR BLD AUTO: 0 % (ref 0–6)
ERYTHROCYTE [DISTWIDTH] IN BLOOD BY AUTOMATED COUNT: 13.1 % (ref 11.6–15.1)
GFR SERPL CREATININE-BSD FRML MDRD: 70 ML/MIN/1.73SQ M
GLUCOSE SERPL-MCNC: 93 MG/DL (ref 65–140)
GLUCOSE UR STRIP-MCNC: NEGATIVE MG/DL
HCT VFR BLD AUTO: 47.1 % (ref 36.5–49.3)
HGB BLD-MCNC: 15.3 G/DL (ref 12–17)
HGB UR QL STRIP.AUTO: NEGATIVE
IMM GRANULOCYTES # BLD AUTO: 0.03 THOUSAND/UL (ref 0–0.2)
IMM GRANULOCYTES NFR BLD AUTO: 0 % (ref 0–2)
INR PPP: 1.02 (ref 0.86–1.17)
KETONES UR STRIP-MCNC: NEGATIVE MG/DL
LEUKOCYTE ESTERASE UR QL STRIP: NEGATIVE
LYMPHOCYTES # BLD AUTO: 0.86 THOUSANDS/ΜL (ref 0.6–4.47)
LYMPHOCYTES NFR BLD AUTO: 9 % (ref 14–44)
MCH RBC QN AUTO: 30.2 PG (ref 26.8–34.3)
MCHC RBC AUTO-ENTMCNC: 32.5 G/DL (ref 31.4–37.4)
MCV RBC AUTO: 93 FL (ref 82–98)
MONOCYTES # BLD AUTO: 0.23 THOUSAND/ΜL (ref 0.17–1.22)
MONOCYTES NFR BLD AUTO: 2 % (ref 4–12)
NEUTROPHILS # BLD AUTO: 8.83 THOUSANDS/ΜL (ref 1.85–7.62)
NEUTS SEG NFR BLD AUTO: 89 % (ref 43–75)
NITRITE UR QL STRIP: NEGATIVE
NRBC BLD AUTO-RTO: 0 /100 WBCS
P AXIS: 62 DEGREES
PH UR STRIP.AUTO: 5.5 [PH] (ref 4.5–8)
PLATELET # BLD AUTO: 231 THOUSANDS/UL (ref 149–390)
PMV BLD AUTO: 10.3 FL (ref 8.9–12.7)
POTASSIUM SERPL-SCNC: 4.1 MMOL/L (ref 3.5–5.3)
PR INTERVAL: 158 MS
PROT UR STRIP-MCNC: NEGATIVE MG/DL
PROTHROMBIN TIME: 13.5 SECONDS (ref 11.8–14.2)
QRS AXIS: -19 DEGREES
QRSD INTERVAL: 94 MS
QT INTERVAL: 394 MS
QTC INTERVAL: 434 MS
RBC # BLD AUTO: 5.06 MILLION/UL (ref 3.88–5.62)
SODIUM SERPL-SCNC: 139 MMOL/L (ref 136–145)
SP GR UR STRIP.AUTO: 1.03 (ref 1–1.03)
T WAVE AXIS: 24 DEGREES
UROBILINOGEN UR QL STRIP.AUTO: 0.2 E.U./DL
VENTRICULAR RATE: 73 BPM
WBC # BLD AUTO: 9.97 THOUSAND/UL (ref 4.31–10.16)

## 2018-12-13 PROCEDURE — 99232 SBSQ HOSP IP/OBS MODERATE 35: CPT | Performed by: INTERNAL MEDICINE

## 2018-12-13 PROCEDURE — 85025 COMPLETE CBC W/AUTO DIFF WBC: CPT | Performed by: INTERNAL MEDICINE

## 2018-12-13 PROCEDURE — 80048 BASIC METABOLIC PNL TOTAL CA: CPT | Performed by: INTERNAL MEDICINE

## 2018-12-13 PROCEDURE — 81003 URINALYSIS AUTO W/O SCOPE: CPT | Performed by: PHYSICIAN ASSISTANT

## 2018-12-13 PROCEDURE — 99255 IP/OBS CONSLTJ NEW/EST HI 80: CPT | Performed by: NEUROLOGICAL SURGERY

## 2018-12-13 PROCEDURE — 85730 THROMBOPLASTIN TIME PARTIAL: CPT | Performed by: INTERNAL MEDICINE

## 2018-12-13 PROCEDURE — 93005 ELECTROCARDIOGRAM TRACING: CPT

## 2018-12-13 PROCEDURE — 85610 PROTHROMBIN TIME: CPT | Performed by: INTERNAL MEDICINE

## 2018-12-13 PROCEDURE — 93010 ELECTROCARDIOGRAM REPORT: CPT | Performed by: INTERNAL MEDICINE

## 2018-12-13 RX ORDER — CHLORHEXIDINE GLUCONATE 0.12 MG/ML
15 RINSE ORAL ONCE
Status: CANCELLED | OUTPATIENT
Start: 2018-12-13 | End: 2018-12-13

## 2018-12-13 RX ORDER — CEFAZOLIN SODIUM 2 G/50ML
2000 SOLUTION INTRAVENOUS ONCE
Status: CANCELLED | OUTPATIENT
Start: 2018-12-13 | End: 2018-12-13

## 2018-12-13 RX ORDER — TAMSULOSIN HYDROCHLORIDE 0.4 MG/1
0.8 CAPSULE ORAL
Status: DISCONTINUED | OUTPATIENT
Start: 2018-12-14 | End: 2018-12-14 | Stop reason: HOSPADM

## 2018-12-13 RX ORDER — SODIUM CHLORIDE 9 MG/ML
125 INJECTION, SOLUTION INTRAVENOUS CONTINUOUS
Status: CANCELLED | OUTPATIENT
Start: 2018-12-13

## 2018-12-13 RX ORDER — CEFAZOLIN SODIUM 1 G/50ML
1000 SOLUTION INTRAVENOUS ONCE
Status: CANCELLED | OUTPATIENT
Start: 2018-12-13 | End: 2018-12-13

## 2018-12-13 RX ORDER — TAMSULOSIN HYDROCHLORIDE 0.4 MG/1
CAPSULE ORAL
Status: COMPLETED
Start: 2018-12-13 | End: 2018-12-13

## 2018-12-13 RX ORDER — HEPARIN SODIUM 5000 [USP'U]/ML
5000 INJECTION, SOLUTION INTRAVENOUS; SUBCUTANEOUS EVERY 8 HOURS SCHEDULED
Status: DISCONTINUED | OUTPATIENT
Start: 2018-12-13 | End: 2018-12-14 | Stop reason: HOSPADM

## 2018-12-13 RX ADMIN — FAMOTIDINE 20 MG: 20 TABLET ORAL at 08:53

## 2018-12-13 RX ADMIN — HEPARIN SODIUM 5000 UNITS: 5000 INJECTION INTRAVENOUS; SUBCUTANEOUS at 22:22

## 2018-12-13 RX ADMIN — AMLODIPINE BESYLATE 2.5 MG: 2.5 TABLET ORAL at 08:53

## 2018-12-13 RX ADMIN — DEXAMETHASONE SODIUM PHOSPHATE 4 MG: 4 INJECTION, SOLUTION INTRAMUSCULAR; INTRAVENOUS at 22:22

## 2018-12-13 RX ADMIN — DEXAMETHASONE SODIUM PHOSPHATE 4 MG: 4 INJECTION, SOLUTION INTRAMUSCULAR; INTRAVENOUS at 05:56

## 2018-12-13 RX ADMIN — CLOTRIMAZOLE AND BETAMETHASONE DIPROPIONATE: 10; .64 CREAM TOPICAL at 08:53

## 2018-12-13 RX ADMIN — TAMSULOSIN HYDROCHLORIDE 0.4 MG: 0.4 CAPSULE ORAL at 18:01

## 2018-12-13 RX ADMIN — DEXAMETHASONE SODIUM PHOSPHATE 4 MG: 4 INJECTION, SOLUTION INTRAMUSCULAR; INTRAVENOUS at 13:54

## 2018-12-13 RX ADMIN — FAMOTIDINE 20 MG: 20 TABLET ORAL at 18:01

## 2018-12-13 RX ADMIN — TAMSULOSIN HYDROCHLORIDE 0.4 MG: 0.4 CAPSULE ORAL at 18:52

## 2018-12-13 RX ADMIN — VALACYCLOVIR HYDROCHLORIDE 500 MG: 500 TABLET, FILM COATED ORAL at 08:53

## 2018-12-13 RX ADMIN — VALACYCLOVIR HYDROCHLORIDE 500 MG: 500 TABLET, FILM COATED ORAL at 18:01

## 2018-12-13 RX ADMIN — PANTOPRAZOLE SODIUM 40 MG: 40 TABLET, DELAYED RELEASE ORAL at 05:46
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
## 2018-12-13 NOTE — SOCIAL WORK
MSW Student met with patient and explained her role  Pt was alert and oriented and reported that his emergency contact is his brother Sundar Louie h) [phone] (c) [phone]  Pt reported that he lives with an older woman in a townhouse  Pt reported that there are 3 steps to enter and 11 steps to bdrm /bath  Pt denied any hx of D/A, MH stays, HHC, inpatient PT/OT  Pt was IPTA with ADLS  Pts PCP is through  and his pharmacy of choice is [facility] on [facility]  CM reviewed d/c planning process including the following: identifying help at home, patient preference for d/c planning needs, Discharge Lounge, [facility] Meds to Bed program, availability of treatment team to discuss questions or concerns patient and/or family may have regarding understanding medications and recognizing signs and symptoms once discharged  CM also encouraged patient to follow up with all recommended appointments after discharge  Patient advised of importance for patient and family to participate in managing patients medical well being      I have read and agree with the above documentation    STEFAN Kelly
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
## 2018-12-13 NOTE — ASSESSMENT & PLAN NOTE
Patient presents with 4-5 days history of back pain followed by right footdrop  Has history of similar symptoms of the left side and underwent surgery in [facility] in the past for that  He however remains with left-sided foot drop  Denies any fall or trauma to the back  MRI of the brain done in emergency room shows L3-4 paracentral to right subarticular zone disc extrusion resulting in severe mass effect on the traversing right L4 nerve root  2  Multilevel disc and facet degenerative change in the lower lumbar spine resulting in chronic nerve root encroachment  These findings were discussed by the emergency room physician with Neurosurgery  Patient will be scheduled for surgical decompression tomorrow  Continue with serial neuro exam   Continue with IV Decadron  NPO past midnight

## 2018-12-13 NOTE — PROGRESS NOTES
[facility] - Internal Medicine Progress Note       PATIENT INFORMATION      Patient: Gail Mckeon 60 y o  male   MRN: [id number]  PCP: Feliciano Edward MD  Unit/Bed#: [facility] 910-01 Encounter: [id number]  Date Of Visit: 18       ASSESSMENTS & PLAN     Right footdrop due to L3-L4 disc protrusion    Assessment & Plan    - plan for OR intervention by neurosurgery  - history of left-sided foot drop (residual) as well s/p neurosurgical intervention years ago   - MRI lumbar spine revealing L3-L4 disc protrusion affecting L4 nerve root - continue IV Decadron  - patient seen ambulating without significant discomfort up and down hallway with obvious bilateral footdropping - right lumbar paraspinal tenderness improved from yesterday     BPH   Assessment & Plan    - continue Flomax     Hypertension   Assessment & Plan    - continue Norvasc     History of bladder cancer   Assessment & Plan    - status post prior bladder surgery  - outpatient follow-up with urology       VTE Prophylaxis:  Heparin SC      SUBJECTIVE     Seen/examined earlier this morning with nursing during rounds  No acute overnight events  He has been seen ambulating across the hallway without significant discomfort as he states that he is used to his left foot drop but the right-sided acute symptom feels fresh  States his back pain has improved/mild right-sided lumbar tenderness  OBJECTIVE     Vitals:   Temp (24hrs), Av 2 °F (36 8 °C), Min:97 4 °F (36 3 °C), Max:99 °F (37 2 °C)    Temp:  [97 4 °F (36 3 °C)-99 °F (37 2 °C)] 98 3 °F (36 8 °C)  HR:  [65-74] 66  Resp:  [18] 18  BP: (154-160)/(80-98) 160/92  SpO2:  [98 %-99 %] 99 %  Body mass index is 22 kg/m²  Input and Output Summary (last 24 hours):        Intake/Output Summary (Last 24 hours) at 18 1251  Last data filed at 18 0900   Gross per 24 hour   Intake                0 ml   Output                0 ml   Net                0 ml       Physical Exam: GENERAL:  Well-developed/nourished   HEAD:  Normocephalic - atraumatic  EYES: PERRL - EOMI   MOUTH:  Mucosa moist  NECK:  Supple - full range of motion  CARDIAC:  Regular rate/rhythm - S1/S2 positive  PULMONARY:  Clear breath sounds bilaterally - nonlabored respirations  ABDOMEN:  Soft - nontender/nondistended - active bowel sounds  MUSCULOSKELETAL:  Motor strength/range of motion intact - mild right lumbar tenderness  NEUROLOGIC:  Alert/oriented x 3 - bilateral foot drop noted  SKIN:  Chronic wrinkles/blemishes - prior spinal incision scar healed  PSYCHIATRIC:  Mood/affect pleasant      ADDITIONAL DATA       Labs & Recent Cultures:       Results from last 7 days  Lab Units 12/13/18  0442   WBC Thousand/uL 9 97   HEMOGLOBIN g/dL 15 3   HEMATOCRIT % 47 1   PLATELETS Thousands/uL 231   NEUTROS PCT % 89*   LYMPHS PCT % 9*   MONOS PCT % 2*   EOS PCT % 0       Results from last 7 days  Lab Units 12/13/18  0442   SODIUM mmol/L 139   POTASSIUM mmol/L 4 1   CHLORIDE mmol/L 105   CO2 mmol/L 28   BUN mg/dL 21   CREATININE mg/dL 1 14   CALCIUM mg/dL 8 5       Results from last 7 days  Lab Units 12/13/18  0001   INR  1 02         Last 24 Hours Medication List:     Current Facility-Administered Medications:  acetaminophen 650 mg Oral Q6H PRN Selam Gibbs MD   amLODIPine 2 5 mg Oral Daily Selam Gibbs MD   clotrimazole-betamethasone  Topical BID Selam Gibbs MD   dexamethasone 4 mg Intravenous Q8H Lucille Freeman MD   famotidine 20 mg Oral BID Selam Gibbs MD   heparin (porcine) 5,000 Units Subcutaneous Q8H [facility] Amauri Lewis MD   ondansetron 4 mg Intravenous Q6H PRN Selam Gibbs MD   pantoprazole 40 mg Oral Early Morning Selam Gibbs MD   tamsulosin 0 4 mg Oral Daily With Saba Duke MD   valACYclovir 500 mg Oral BID Selam Gibbs MD          Time Spent for Care: 32 minutes  More than 50% of total time spent on counseling and coordination of care as described above        Current Length of Stay: 1 day(s)      Code Status: Level 1 - Full Code         ** Please Note: This note is constructed using a voice recognition dictation system   **

## 2018-12-13 NOTE — H&P
H&P- Timothy Joel 1960, 60 y o  male MRN: [id number]    Unit/Bed#: [facility] Encounter: [id number]    Primary Care Provider: Rafiq Avery MD   Date and time admitted to hospital: 12/12/2018  3:00 PM        * Footdrop   Assessment & Plan    Patient presents with 4-5 days history of back pain followed by right footdrop  Has history of similar symptoms of the left side and underwent surgery in [place] in the past for that  He however remains with left-sided foot drop  Denies any fall or trauma to the back  MRI of the brain done in emergency room shows L3-4 paracentral to right subarticular zone disc extrusion resulting in severe mass effect on the traversing right L4 nerve root  2  Multilevel disc and facet degenerative change in the lower lumbar spine resulting in chronic nerve root encroachment  These findings were discussed by the emergency room physician with Neurosurgery  Patient will be scheduled for surgical decompression tomorrow  Continue with serial neuro exam   Continue with IV Decadron  NPO past midnight  Bladder cancer [facility])   Assessment & Plan    Patient has been treated for that in the past   Outpatient Urology follow-up  Benign nodular prostatic hyperplasia with lower urinary tract symptoms   Assessment & Plan    Maintained on Flomax  Hypertension   Assessment & Plan    Continue with amlodipine  VTE Prophylaxis: Enoxaparin (Lovenox)  / sequential compression device   Code Status:  Full code  POLST: POLST form is not discussed and not completed at this time  Discussion with family:  Discussed with patient at length  Anticipated Length of Stay:  Patient will be admitted on an Inpatient basis with an anticipated length of stay of  > 2 midnights  Justification for Hospital Stay: neurosurgical evaluation/ possible need for surgery    Total Time for Visit, including Counseling / Coordination of Care: 45 minutes    Greater than 50% of this total time spent on direct patient counseling and coordination of care  Chief Complaint:   Back pain  History of Present Illness:    Aurea Blanco is a 60 y o  male who presents with 4-5 day history of back pain with started well patient felt that her back muscle 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  This is a was subsequently followed by stan Madrid and he noticed that he had a right foot drop  Patient states that he has had similar symptoms resulting in left foot drop from a back injury sustained in 2002  He subsequently underwent surgery however never regained his left footdorsiflexion completely   He currently denies any trauma to the back or any falls  He went to see his primary care physician yesterday and saw the nurse practitioner and was recommended an outpatient MRI  However patient felt that he needed a more urgent MRI because of his prior experience with left foot  He denies any worsening back pain  Denies any bowel or bladder disturbances  Complains of occasional numbness of the right lateral aspect of the thigh but denies any weakness other than the foot drop  Review of Systems:    Review of Systems   Constitutional: Positive for activity change  HENT: Negative  Eyes: Negative  Respiratory: Negative  Cardiovascular: Negative  Gastrointestinal: Negative  Endocrine: Negative  Genitourinary: Positive for frequency  Musculoskeletal: Positive for back pain and gait problem  Skin: Negative  Allergic/Immunologic: Negative  Neurological: Positive for weakness and numbness         Past Medical and Surgical History:     Past Medical History:   Diagnosis Date    Dry skin dermatitis     Dyspepsia     last assessed 09/27/17    Hypertension        Past Surgical History:   Procedure Laterality Date    BACK SURGERY Left     left with foot drop after surgery    BLADDER SURGERY         Meds/Allergies:    Prior to Admission medications    Medication Sig Start Date End Date Taking? Authorizing Provider   amLODIPine (NORVASC) 2 5 mg tablet Take 1 tablet (2 5 mg total) by mouth daily 5/18/18   Rossi Smith MD   betamethasone dipropionate 0 05 % lotion Apply 1 application topically 2 (two) times a day To affected area 11/2/18   Historical Provider, MD   ciprofloxacin (CIPRO) 500 mg tablet Take 500 mg by mouth 2 (two) times a day 3/19/18   Historical Provider, MD   clotrimazole-betamethasone (LOTRISONE) 1-0 05 % cream Use as directed 11/17/18   Historical Provider, MD   ketoconazole (NIZORAL) 2 % cream APPLY TO AFFECTED AREA TWICE A DAY TO RASH IN SKIN FOLDS  11/27/18   Historical Provider, MD   omeprazole (PriLOSEC) 20 mg delayed release capsule TAKE 1 CAPSULE (20 MG) BY MOUTH DAILY 30 MINUTES BEFORE MORNING MEAL 10/3/18   Historical Provider, MD   PROCTOSOL HC 2 5 % rectal cream MIX WITH KETOCONAZOLE AND APPLY TO AFFECTED AREAS IN SKIN FOLDS TWICE DAILY FOR 2 WEEKS 12/3/18   Historical Provider, MD   ranitidine (ZANTAC) 150 mg tablet Take 1 tablet by mouth 2 (two) times a day 9/22/17   Historical Provider, MD   tamsulosin (FLOMAX) 0 4 mg Take 1 capsule by mouth daily    Historical Provider, MD   valACYclovir (VALTREX) 500 mg tablet Take 500 mg by mouth 2 (two) times a day 2/3/18   Historical Provider, MD     I have reviewed home medications with patient personally      Allergies: No Known Allergies    Social History:     Marital Status:    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 Use History:   History   Alcohol Use No     History   Smoking Status    Never Smoker   Smokeless Tobacco    Never Used     History   Drug Use No       Family History:    non-contributory    Physical Exam:     Vitals:   Blood Pressure: 154/80 (12/12/18 1337)  Pulse: 74 (12/12/18 1337)  Temperature: (!) 97 4 °F (36 3 °C) (12/12/18 1337)  Temp Source: Tympanic (12/12/18 1337)  Respirations: 18 (12/12/18 1337)  Weight - Scale: 81 6 kg (180 lb) (12/12/18 1338)  SpO2: 98 % (12/12/18 8137)    Physical Exam   Constitutional: He is oriented to person, place, and time  No distress  HENT:   Head: Normocephalic and atraumatic  Mouth/Throat: Oropharynx is clear and moist    Eyes: Pupils are equal, round, and reactive to light  Conjunctivae are normal    Neck: Normal range of motion  Neck supple  Cardiovascular: Normal rate and regular rhythm  Pulmonary/Chest: Effort normal and breath sounds normal    Abdominal: Soft  Bowel sounds are normal    Musculoskeletal: He exhibits deformity  Neurological: He is alert and oriented to person, place, and time  Right foot drop  Old left foot drop   Skin: Skin is warm and dry  Rash noted  He is not diaphoretic  Additional Data:     Lab Results: I have personally reviewed pertinent reports  Imaging: I have personally reviewed pertinent reports  MRI lumbar spine w wo contrast   Final Result by Anne Marie Tamez MD (12/12 2152)         1  L3-4 paracentral to right subarticular zone disc extrusion resulting in severe mass effect on the traversing right L4 nerve root  2   Multilevel disc and facet degenerative change in the lower lumbar spine resulting in chronic nerve root encroachment  Workstation performed: [id number]         MRI lumbar spine w wo contrast    (Results Pending)       EKG, Pathology, and Other Studies Reviewed on Admission:   · EKG:NA    Allscripts / Epic Records Reviewed: Yes     ** Please Note: This note has been constructed using a voice recognition system   **

## 2018-12-13 NOTE — ED PROVIDER NOTES
History  Chief Complaint   Patient presents with    Medical Problem     Patient reports he started x1 week ago with a drop foot in his right foot  Patient reports he already has drop foot in the left from 2002 and he states, "I'm here because I need an MRI and need to get this fixed immediately  I cannot have two floppy feet "      HPI    Prior to Admission Medications   Prescriptions Last Dose Informant Patient Reported? Taking? PROCTOSOL HC 2 5 % rectal cream   Yes No   Sig: MIX WITH KETOCONAZOLE AND APPLY TO AFFECTED AREAS IN SKIN FOLDS TWICE DAILY FOR 2 WEEKS   amLODIPine (NORVASC) 2 5 mg tablet   No No   Sig: Take 1 tablet (2 5 mg total) by mouth daily   betamethasone dipropionate 0 05 % lotion   Yes No   Sig: Apply 1 application topically 2 (two) times a day To affected area   ciprofloxacin (CIPRO) 500 mg tablet   Yes No   Sig: Take 500 mg by mouth 2 (two) times a day   clotrimazole-betamethasone (LOTRISONE) 1-0 05 % cream   Yes No   Sig: Use as directed   ketoconazole (NIZORAL) 2 % cream   Yes No   Sig: APPLY TO AFFECTED AREA TWICE A DAY TO RASH IN SKIN FOLDS     omeprazole (PriLOSEC) 20 mg delayed release capsule   Yes No   Sig: TAKE 1 CAPSULE (20 MG) BY MOUTH DAILY 30 MINUTES BEFORE MORNING MEAL   ranitidine (ZANTAC) 150 mg tablet   Yes No   Sig: Take 1 tablet by mouth 2 (two) times a day   tamsulosin (FLOMAX) 0 4 mg   Yes No   Sig: Take 1 capsule by mouth daily   valACYclovir (VALTREX) 500 mg tablet   Yes No   Sig: Take 500 mg by mouth 2 (two) times a day      Facility-Administered Medications: None       Past Medical History:   Diagnosis Date    Dry skin dermatitis     Dyspepsia     last assessed 09/27/17    Hypertension        Past Surgical History:   Procedure Laterality Date    BACK SURGERY Left     left with foot drop after surgery    BLADDER SURGERY         Family History   Problem Relation Age of Onset    Stomach cancer Mother     Raynaud syndrome Mother     Heart attack Father     Heart attack Brother      I have reviewed and agree with the history as documented  Social History   Substance Use Topics    Smoking status: Never Smoker    Smokeless tobacco: Never Used    Alcohol use No        Review of Systems    Physical Exam  ED Triage Vitals   Temperature Pulse Respirations Blood Pressure SpO2   12/12/18 1337 12/12/18 1337 12/12/18 1337 12/12/18 1337 12/12/18 1337   (!) 97 4 °F (36 3 °C) 74 18 154/80 98 %      Temp Source Heart Rate Source Patient Position - Orthostatic VS BP Location FiO2 (%)   12/12/18 1337 12/12/18 1337 12/12/18 1337 12/12/18 1337 --   Tympanic Monitor Sitting Left arm       Pain Score       12/12/18 1338       5           Orthostatic Vital Signs  Vitals:    12/12/18 1337   BP: 154/80   Pulse: 74   Patient Position - Orthostatic VS: Sitting       Physical Exam    ED Medications  Medications   Gadobutrol injection (SINGLE-DOSE) SOLN 8 mL (8 mL Intravenous Given 12/12/18 2005)       Diagnostic Studies  Results Reviewed     None                 MRI lumbar spine w wo contrast   Final Result by Tramaine Raman MD (12/12 2152)         1  L3-4 paracentral to right subarticular zone disc extrusion resulting in severe mass effect on the traversing right L4 nerve root  2   Multilevel disc and facet degenerative change in the lower lumbar spine resulting in chronic nerve root encroachment  Workstation performed: [id number]         MRI lumbar spine w wo contrast    (Results Pending)         Procedures  Procedures      Phone Consults  ED Phone Contact    ED Course  ED Course as of Dec 12 2229   Wed Dec 12, 2018   1557 This is a 21-year-old male presenting emergency department for evaluation of weakness in the right lower extremity that has been present for the last 6 days  He says he is unable to dorsiflex his right foot due to footdrop    He admits to pain in the right groin and along the right iliac crest when in a sitting position as well as pain behind the right knee which is present when he stretches the posterior muscle groups  He has a history of similar symptoms on the left side after a horse racing injury in 2002 where he had a bone chip to Sezze by neurosurgeon in [place]  He says at that time he waited too long episode history and he has per minute foot drop in the left leg notable atrophy over the anterior shin  On exam he has intact sensation to pinprick, light touch, and proprioception  He is unable to dorsiflex the foot  Has normal Achilles reflex and patellar reflex  He is able to walk, stand on his toes, and squat with balance assistance  He has no tenderness to palpation or abnormalities with palpation over his low back or hip  MDM  CritCare Time    Disposition  Final diagnoses:   Left foot drop     Time reflects when diagnosis was documented in both MDM as applicable and the Disposition within this note     Time User Action Codes Description Comment    12/12/2018  4:11 PM Lindsey Rousseau Add [M21 372] Left foot drop       ED Disposition     None      Follow-up Information     Follow up With Specialties Details Why Contact Info Additional [address] [place] [address] Neurosurgery Schedule an appointment as soon as possible for a visit MRI follow up [place] [phone]  [facility] 9, [address] [place] [address], [phone]          Patient's Medications   Discharge Prescriptions    No medications on file       Outpatient Discharge Orders  MRI lumbar spine w wo contrast   Standing Status: Future  Standing Exp  Date: 12/12/22         ED Provider  Attending physically available and evaluated Jose L Alanis I managed the patient along with the ED Attending      Electronically Signed by         Equilla Rubinstein, MD  12/12/18 6709

## 2018-12-13 NOTE — ED ATTENDING ATTESTATION
Alireza Guerrier, , saw and evaluated the patient  I have discussed the patient with the resident/non-physician practitioner and agree with the resident's/non-physician practitioner's findings, Plan of Care, and MDM as documented in the resident's/non-physician practitioner's note, except where noted  All available labs and Radiology studies were reviewed  At this point I agree with the current assessment done in the Emergency Department  I have conducted an independent evaluation of this patient a history and physical is as follows:    Patient is a 58-year-old male presenting for approximately 1 week of pain along his right hip, lumbosacral region radiating down to his right groin iliac region  He also noted a right foot drop at that time  He already has a left footdrop from injury sustained back in 2002 which was delayed in caring never required surgery  He states he is now having difficulty walking with 2 floppy feet  States pain is controlled with Motrin Tylenol but he is mostly concerned about the foot drop  Discussed with Radiology and will obtain an MRI  Patient updated about plan of care to obtain MRI from the emergency department  Discussed with Dr Gail Valdez of Neurosurgery who recommended MRI with contrast which was ordered  Awaiting results  MRI:  MRI LUMBAR SPINE WITH AND WITHOUT CONTRAST     INDICATION: Foot drop      COMPARISON:  None      TECHNIQUE:  Sagittal T1, sagittal T2, sagittal inversion recovery, axial T1 and axial T2, coronal T2  Sagittal and axial T1 postcontrast      IV Contrast:  8 mL of Gadobutrol injection (SINGLE-DOSE)      IMAGE QUALITY:  Diagnostic     FINDINGS:     ALIGNMENT:  Grade Ispace L5 spondylolisthesis secondary to pars defects      MARROW SIGNAL:  Heterogeneous marrow signal in the L4 and L5 vertebral bodies secondary to chronic endplate degenerative change      DISTAL CORD AND CONUS:  Normal size and signal of the distal cord and conus    The conus ends at the L1 level      PARASPINAL SOFT TISSUES:  No mass or collection      SACRUM:  Visualized portions of the sacrum demonstrating heterogeneous marrow signal without evidence of focal lesion      LOWER THORACIC DISC SPACES:  Normal disc height and signal   No disc herniation, canal stenosis or foraminal narrowing      LUMBAR DISC SPACES:  Loss of disc height and signal throughout the lumbar spine, most prominent at L4-5, consistent with chronic degenerative change      L1-L2:  Posterior disc bulge and facet arthropathy  Mild ligamentum flavum hypertrophy  Mild central canal stenosis and neural foraminal narrowing      L2-L3:  Posterior disc bulge and paracentral annular tear  Facet arthropathy and ligamentum flavum hypertrophy  Mild central canal stenosis and neural foraminal narrowing      L3-L4:  Posterior disc osteophyte complex and disc bulge  Paracentral to right subarticular zone disc extrusion measuring 13 x 11 x 8 mm  Facet arthropathy and ligamentum flavum hypertrophy  Moderate central canal stenosis  Narrowing of the bilateral   subarticular zones with significant mass effect on the traversing right L4 nerve root  Mild crowding of the cauda equina  Mild to moderate bilateral neural foraminal narrowing      L4-L5:  Posterior disc osteophytes  Left hemilaminectomy  Decompression of the central canal   Severe bilateral neural foraminal narrowing      L5-S1:  Uncovering of the disc posteriorly and disc bulge  Facet osteophytosis  No central canal stenosis  Narrowing of the right subarticular zone and encroachment of the traversing S1 nerve root  Moderate left and severe right neural foraminal   narrowing      POSTCONTRAST IMAGING:  No abnormal conus, cauda equina or leptomeningeal enhancement      IMPRESSION:        1  L3-4 paracentral to right subarticular zone disc extrusion resulting in severe mass effect on the traversing right L4 nerve root    2   Multilevel disc and facet degenerative change in the lower lumbar spine resulting in chronic nerve root encroachment        Discussed with Dr Lawton Rank - Neurosurgery  Will admit  Pt updated and agrees with plan         Critical Care Time  CritCare Time    Procedures

## 2018-12-13 NOTE — ASSESSMENT & PLAN NOTE
- plan for OR intervention by neurosurgery  - history of left-sided foot drop (remains) as well s/p neurosurgical intervention years ago   - MRI lumbar spine revealing L3-L4 disc protrusion affecting L4 nerve root - continue IV Decadron  - patient seen ambulating without significant discomfort up and down hallway with obvious bilateral footdropping - right lumbar paraspinal tenderness improved from yesterday

## 2018-12-13 NOTE — OCCUPATIONAL THERAPY NOTE
OCCUPATIONAL THERAPY SCREEN:    ORDERS RECEIVED  CHART REVIEW COMPLETED  PER STAFF, PT CURRENTLY FUNCTIONING AT INDEPENDENT LEVEL WITH NO ACUTE CARE OT NEEDS AT THIS TIME  PT ALSO PLANNED FOR OR FOR NEUROSX INTERVENTION  RECONSULT IF APPROPRIATE, AS NEEDED  D/C OT       Zelda Diacik, MOT, OTR/L

## 2018-12-13 NOTE — PHYSICAL THERAPY NOTE
Physical Therapy Screen    Patient Name: Carol Harmon    YHNCA'Z Date: 12/13/2018     Problem List  Patient Active Problem List   Diagnosis    Hypertension    BPH    History of bladder cancer    Right footdrop due to L3-L4 disc protrusion     [place] syndrome        Past Medical History  Past Medical History:   Diagnosis Date    Dry skin dermatitis     Dyspepsia     last assessed 09/27/17    Hypertension         Past Surgical History  Past Surgical History:   Procedure Laterality Date    BACK SURGERY Left     left with foot drop after surgery    BLADDER SURGERY          PT orders received  Pt currently functioning at independent level with no acute care PT needs at this time  Pt scheduled for Neurosx intervention  Please reconsult if appropriate or as needed   D/C PT    Caden Lindquist, PT

## 2018-12-14 ENCOUNTER — TRANSITIONAL CARE MANAGEMENT (OUTPATIENT)
Dept: FAMILY MEDICINE CLINIC | Facility: CLINIC | Age: 58
End: 2018-12-14

## 2018-12-14 ENCOUNTER — TELEPHONE (OUTPATIENT)
Dept: FAMILY MEDICINE CLINIC | Facility: CLINIC | Age: 58
End: 2018-12-14

## 2018-12-14 VITALS
TEMPERATURE: 98.3 F | HEIGHT: 75 IN | DIASTOLIC BLOOD PRESSURE: 80 MMHG | RESPIRATION RATE: 18 BRPM | WEIGHT: 176 LBS | OXYGEN SATURATION: 96 % | HEART RATE: 64 BPM | SYSTOLIC BLOOD PRESSURE: 137 MMHG | BODY MASS INDEX: 21.88 KG/M2

## 2018-12-14 PROBLEM — M21.371 RIGHT FOOT DROP: Status: ACTIVE | Noted: 2018-12-12

## 2018-12-14 LAB
BASOPHILS # BLD AUTO: 0.01 THOUSANDS/ΜL (ref 0–0.1)
BASOPHILS NFR BLD AUTO: 0 % (ref 0–1)
EOSINOPHIL # BLD AUTO: 0.01 THOUSAND/ΜL (ref 0–0.61)
EOSINOPHIL NFR BLD AUTO: 0 % (ref 0–6)
ERYTHROCYTE [DISTWIDTH] IN BLOOD BY AUTOMATED COUNT: 13.2 % (ref 11.6–15.1)
HCT VFR BLD AUTO: 45.7 % (ref 36.5–49.3)
HGB BLD-MCNC: 15.1 G/DL (ref 12–17)
IMM GRANULOCYTES # BLD AUTO: 0.05 THOUSAND/UL (ref 0–0.2)
IMM GRANULOCYTES NFR BLD AUTO: 0 % (ref 0–2)
LYMPHOCYTES # BLD AUTO: 0.82 THOUSANDS/ΜL (ref 0.6–4.47)
LYMPHOCYTES NFR BLD AUTO: 6 % (ref 14–44)
MCH RBC QN AUTO: 30.4 PG (ref 26.8–34.3)
MCHC RBC AUTO-ENTMCNC: 33 G/DL (ref 31.4–37.4)
MCV RBC AUTO: 92 FL (ref 82–98)
MONOCYTES # BLD AUTO: 0.54 THOUSAND/ΜL (ref 0.17–1.22)
MONOCYTES NFR BLD AUTO: 4 % (ref 4–12)
NEUTROPHILS # BLD AUTO: 11.74 THOUSANDS/ΜL (ref 1.85–7.62)
NEUTS SEG NFR BLD AUTO: 90 % (ref 43–75)
NRBC BLD AUTO-RTO: 0 /100 WBCS
PLATELET # BLD AUTO: 236 THOUSANDS/UL (ref 149–390)
PMV BLD AUTO: 10.6 FL (ref 8.9–12.7)
RBC # BLD AUTO: 4.96 MILLION/UL (ref 3.88–5.62)
WBC # BLD AUTO: 13.17 THOUSAND/UL (ref 4.31–10.16)

## 2018-12-14 PROCEDURE — 99232 SBSQ HOSP IP/OBS MODERATE 35: CPT | Performed by: NEUROLOGICAL SURGERY

## 2018-12-14 PROCEDURE — 85025 COMPLETE CBC W/AUTO DIFF WBC: CPT | Performed by: INTERNAL MEDICINE

## 2018-12-14 PROCEDURE — 99239 HOSP IP/OBS DSCHRG MGMT >30: CPT | Performed by: INTERNAL MEDICINE

## 2018-12-14 RX ORDER — DEXAMETHASONE 1 MG
TABLET ORAL
Qty: 56 TABLET | Refills: 0 | Status: SHIPPED | OUTPATIENT
Start: 2018-12-14 | End: 2018-12-18 | Stop reason: HOSPADM

## 2018-12-14 RX ORDER — CEFAZOLIN SODIUM 2 G/50ML
2000 SOLUTION INTRAVENOUS ONCE
Status: CANCELLED | OUTPATIENT
Start: 2018-12-14 | End: 2018-12-14

## 2018-12-14 RX ADMIN — FAMOTIDINE 20 MG: 20 TABLET ORAL at 08:35

## 2018-12-14 RX ADMIN — DEXAMETHASONE SODIUM PHOSPHATE 4 MG: 4 INJECTION, SOLUTION INTRAMUSCULAR; INTRAVENOUS at 06:34

## 2018-12-14 RX ADMIN — VALACYCLOVIR HYDROCHLORIDE 500 MG: 500 TABLET, FILM COATED ORAL at 08:35

## 2018-12-14 RX ADMIN — CLOTRIMAZOLE AND BETAMETHASONE DIPROPIONATE: 10; .64 CREAM TOPICAL at 08:35

## 2018-12-14 RX ADMIN — AMLODIPINE BESYLATE 2.5 MG: 2.5 TABLET ORAL at 08:35

## 2018-12-14 NOTE — TELEPHONE ENCOUNTER
Patient was seen on Tuesday by Aramis Henry and he told me he had to take action himself because he wanted to get an MRI ASAP due to the pain he was experiencing  Per patient he went to the ER on Wednesday and was admitted for two days  Per patient he is now scheduled to have surgery on Monday 12/17/2018

## 2018-12-14 NOTE — DISCHARGE SUMMARY
Discharge Summary - [facility] Hospitalist Service - Internal Medicine      Patient Information: Samuel Lei 60 y o  male MRN: [id number]  Unit/Bed#: [facility] 910-01 Encounter: [id number]    Discharging Physician / Practitioner: Kathe Dinh MD  PCP: Madison Krishnan MD  Admission Date:   Admission Orders     Ordered        12/12/18 2230  Inpatient Admission (expected length of stay for this patient is greater than two midnights)  Once             Discharge Date: 12/14/18      Reason for Admission:  Back pain with right foot drop       Discharge Diagnoses:     Principal Problem:    Right footdrop due to L3-L4 disc protrusion     Active Problems:    BPH    Hypertension    History of bladder cancer    Right foot drop    Steroid-induced leukocytosis      Consultations During Hospital Stay:  · Neurosurgery      Hospital Course:     Right footdrop due to L3-L4 disc protrusion    Assessment & Plan     - plan for OR intervention by neurosurgery next week (discharge home today with planned readmission next week) - fitted with a brace prior to discharge  - history of left-sided foot drop (residual) as well s/p neurosurgical intervention years ago   - MRI lumbar spine revealing L3-L4 disc protrusion affecting L4 nerve root - IV Decadron transition to oral regimen for tapering course - 4 mg TID x1 day, then 2 mg every QID x2 days, then 2 mg TID x2 days, then 2 mg BID x2 days, then 1 mg BID x2 days, then 1 mg daily x2 days, then 0 5 mg daily x2 days, then STOP  - patient seen ambulating without significant discomfort up and down hallway with obvious bilateral footdropping - right lumbar paraspinal tenderness improved from admission      BPH   Assessment & Plan     - continue Flomax      Hypertension   Assessment & Plan     - continue Norvasc      History of bladder cancer   Assessment & Plan     - status post prior bladder surgery  - outpatient follow-up with urology          Condition at Discharge: fair       Discharge Day Visit / Exam:     Vitals: Blood Pressure: 137/80 (12/14/18 0749)  Pulse: 64 (12/14/18 0749)  Temperature: 98 3 °F (36 8 °C) (12/14/18 0749)  Temp Source: Oral (12/14/18 0749)  Respirations: 18 (12/14/18 0749)  Height: 6' 3" (190 5 cm) (12/12/18 2310)  Weight - Scale: 79 8 kg (176 lb) (12/12/18 2310)  SpO2: 96 % (12/14/18 0858)      Physical exam - I had a face-to-face encounter with the patient on day of discharge  Discussion with Patient and/or Family:  The patient has been advised to return to the ER immediately if any symptoms recur or worsen  Discharge instructions/Information to Patient and/or Family:   See after visit summary for information provided to patient and/or family  Provisions for Follow-Up Care:  See after visit summary for information related to follow-up care and any pertinent home health orders  Disposition:   Home with planned readmission next week      Discharge Medications:  See after visit summary for reconciled discharge medications provided to patient and/or family  Discharge Statement:  I spent 38 minutes discharging the patient  This time was spent on the day of discharge  I had direct contact with the patient on the day of discharge  Greater than 50% of the total time was spent examining patient, answering all patient questions, arranging and discussing plan of care with patient as well as directly providing post-discharge instructions  Additional time then spent on discharge activities     ** Please Note: This note is constructed using a voice recognition dictation system   **

## 2018-12-14 NOTE — ORTHOTIC NOTE
Orthotic Note            Date: 12/14/2018      Patient Name: Ric Girard            Reason for Consult:  Patient Active Problem List   Diagnosis    Hypertension    BPH    History of bladder cancer    Right footdrop due to L3-L4 disc protrusion     Mosheim syndrome    Right foot drop       (10 minutes)    Donned Large Procare Mafo to pts RLE while pt was sitting in chair  Pt tolerated well during fitting  Instructions and adjustments were reviewed during this time  Pt adjusted MAFO during session and ambulated in burgess to get a feel of the brace  Pt left sitting in chair  Will continue to follow daily  Contact information left at bedside  RN aware  Recommendations:  Please call ext  [phone] with questions regarding bracing instruction and or adjustment(s)      Reshma Cain Restorative Technician, BS

## 2018-12-14 NOTE — SOCIAL WORK
Cm review patient during care coordination rounds  Patient is medically stable for discharge at this time  Cm informed that the patient would be returning for procedure next week  Patient does need prescription filled an agreement to use [facility]med the bed program   Cm confirmed with pharmacy that prescription would be $13 01  The uninformed patient who was in agreement with prescription costs  Patient to drive himself home, and be aware an agreement  CM reviewed d/c planning process including the following: identifying help at home, patient preference for d/c planning needs, Discharge Lounge, [facility] Meds to Bed program, availability of treatment team to discuss questions or concerns patient and/or family may have regarding understanding medications and recognizing signs and symptoms once discharged  CM also encouraged patient to follow up with all recommended appointments after discharge  Patient advised of importance for patient and family to participate in managing patients medical well being

## 2018-12-14 NOTE — PROGRESS NOTES
Progress Note - Neurosurgery   Satnam Neri 60 y o  male MRN: [id number]  Unit/Bed#: [facility] 910-01 Encounter: [id number]    Assessment:  1  Right Foot drop  2  Chronic Left Foot drop  3  Hx of Back surgery for Left Foot drop  4  HTN  5  Bladder Cancer  6  Benign nodular prostatic hyperplasia with lower urinary tract symptoms    Plan:  · Exam revealed: GCS 15, AO x 3, Motor: strength 5/5 throughout except RDF: 0/5,  LDF: 0/5, mild TTP of right lumbar spine/ right SI joint, sensation normal to LT/ PP, proprioception normal, reflexes 1+ throughout except R  Achilles 2+, gait: cannot walk on bilateral heels, but can walk on bilateral toes  · Imaging reviewed personally and by attending  Results listed below:  ? Mri Lumbar Spine W Wo Contrast, Result Date: 12/12/2018: 1  L3-4 paracentral to right subarticular zone disc extrusion resulting in severe mass effect on the traversing right L4 nerve root  2   Multilevel disc and facet degenerative change in the lower lumbar spine resulting in chronic nerve root encroachment  · Ongoing medical management by primary team - Hospitalist service  · Pain management per primary team  · DVT ppx: heparin  · PT/OT Eval and mobilize   · MAFO brace ordered to support right foot drop  Wear as needed for right foot support  · Pt is okay for discharge from neurosurgery standpoint today Friday 12/14/18 since he is medically stable  He will have a planned readmission on Monday 12/17/18 for a Minimally Invasive Lumbar spinal decompressive surgery with Dr Sharla Rasmussen  Discussed with patient the plan of care for discharge today and for readmission on Monday for his lumbar surgery  Pt is agreeable to this plan  Neurosurgery will see pt as needed during the remainder of his hospitalization  Please call with any questions or concerns  Subjective/Objective   Chief Complaint: " I still cannot lift up my right foot but my back pain is tolerable" x 1 week       Subjective: Pt is a 61 yo male who presented 2 days ago with right foot drop that began about 1 week ago  Today he reports that his symptoms have not changed, gotten better or worse  He reports he still can't lift up his right foot  He also continues to have chronic left foot drop  Pt reports he is still able to ambulate with a change in his gait due to the bilateral foot drop  He reports right sided back pain and right buttock or hip pain around his SI joint that does not radiate that he rates as  4/10 reporting that it is tolerable  Pt denies numbness or tingling in his legs  Besides the bilateral foot drop, he denies weakness in his hips/knees  He has been able to get to he bathroom to void  He denies loss in control of his bowel or bladder  He denies chest pain, SOB, N, V or abdominal pain  He has tolerated a regular diet  Objective: Alert and awake, walking in his room  NAD  I/O       12/12 0701 - 12/13 0700 12/13 0701 - 12/14 0700 12/14 0701 - 12/15 0700    P  O  0 1120     Total Intake(mL/kg) 0 (0) 1120 (14)     Net 0 +1120             Unmeasured Urine Occurrence  5 x           Invasive Devices     Peripheral Intravenous Line            Peripheral IV 12/12/18 Right Antecubital 1 day                Physical Exam:  Vitals: Blood pressure 137/80, pulse 64, temperature 98 3 °F (36 8 °C), temperature source Oral, resp  rate 18, height 6' 3" (1 905 m), weight 79 8 kg (176 lb), SpO2 96 %  ,Body mass index is 22 kg/m²  General appearance: alert, appears stated age, cooperative and no distress  Head: Normocephalic, without obvious abnormality, atraumatic  Eyes: EOMI, PERRL  Neck: supple, symmetrical, trachea midline a  Back: no kyphosis present, ml tenderness to percussion or palpation  Lungs: non labored breathing  Heart: regular heart rate  Neurologic:   Mental status: Alert, oriented x 3, thought content appropriate  Cranial nerves: grossly intact (Cranial nerves II-XII)  Sensory: normal to LT/PP    Motor: strength 5/5 throughout except RDF: 0/5,  LDF: 0/5, mild TTP of right lumbar spine/ right SI joint  Reflexes: 1+ throughout except R  Achilles 2+  Coordination:Gait: ambulating independently but gait affected due to bilat foot drop  Able to walk on bilat toes but not on bilat heels  Lab Results:    Results from last 7 days  Lab Units 12/14/18  0441 12/13/18  0442   WBC Thousand/uL 13 17* 9 97   HEMOGLOBIN g/dL 15 1 15 3   HEMATOCRIT % 45 7 47 1   PLATELETS Thousands/uL 236 231   NEUTROS PCT % 90* 89*   MONOS PCT % 4 2*       Results from last 7 days  Lab Units 12/13/18  0442   POTASSIUM mmol/L 4 1   CHLORIDE mmol/L 105   CO2 mmol/L 28   BUN mg/dL 21   CREATININE mg/dL 1 14   CALCIUM mg/dL 8 5               Results from last 7 days  Lab Units 12/13/18  0001   INR  1 02   PTT seconds 29     Imaging Studies: I have personally reviewed pertinent reports  and I have personally reviewed pertinent films in PACS  Mri Lumbar Spine W Wo Contrast    Result Date: 12/12/2018  Impression: 1  L3-4 paracentral to right subarticular zone disc extrusion resulting in severe mass effect on the traversing right L4 nerve root  2   Multilevel disc and facet degenerative change in the lower lumbar spine resulting in chronic nerve root encroachment  Workstation performed: [id number]     EKG, Pathology, and Other Studies: I have personally reviewed pertinent reports  VTE Pharmacologic Prophylaxis: Heparin    VTE Mechanical Prophylaxis: reason for no mechanical VTE prophylaxis Pt frequently mobilizing independently in room and hallways

## 2018-12-17 ENCOUNTER — HOSPITAL ENCOUNTER (OUTPATIENT)
Facility: HOSPITAL | Age: 58
Setting detail: OUTPATIENT SURGERY
Discharge: HOME | End: 2018-12-18
Attending: NEUROLOGICAL SURGERY | Admitting: NEUROLOGICAL SURGERY
Payer: COMMERCIAL

## 2018-12-17 ENCOUNTER — ANESTHESIA EVENT (OUTPATIENT)
Dept: PERIOP | Facility: HOSPITAL | Age: 58
End: 2018-12-17
Payer: COMMERCIAL

## 2018-12-17 ENCOUNTER — ANESTHESIA (OUTPATIENT)
Dept: PERIOP | Facility: HOSPITAL | Age: 58
End: 2018-12-17
Payer: COMMERCIAL

## 2018-12-17 ENCOUNTER — HOSPITAL ENCOUNTER (OUTPATIENT)
Dept: RADIOLOGY | Facility: HOSPITAL | Age: 58
Setting detail: OUTPATIENT SURGERY
Discharge: HOME | End: 2018-12-17
Payer: COMMERCIAL

## 2018-12-17 DIAGNOSIS — Z98.890 POST-OPERATIVE STATE: Primary | ICD-10-CM

## 2018-12-17 DIAGNOSIS — M21.371 RIGHT FOOT DROP: ICD-10-CM

## 2018-12-17 LAB — GLUCOSE SERPL-MCNC: 122 MG/DL (ref 65–140)

## 2018-12-17 PROCEDURE — 72020 X-RAY EXAM OF SPINE 1 VIEW: CPT

## 2018-12-17 PROCEDURE — 63030 LAMOT DCMPRN NRV RT 1 LMBR: CPT | Performed by: PHYSICIAN ASSISTANT

## 2018-12-17 PROCEDURE — 88304 TISSUE EXAM BY PATHOLOGIST: CPT | Performed by: PATHOLOGY

## 2018-12-17 PROCEDURE — 63030 LAMOT DCMPRN NRV RT 1 LMBR: CPT | Performed by: NEUROLOGICAL SURGERY

## 2018-12-17 PROCEDURE — 82948 REAGENT STRIP/BLOOD GLUCOSE: CPT

## 2018-12-17 RX ORDER — LABETALOL HYDROCHLORIDE 5 MG/ML
INJECTION, SOLUTION INTRAVENOUS AS NEEDED
Status: DISCONTINUED | OUTPATIENT
Start: 2018-12-17 | End: 2018-12-17 | Stop reason: SURG

## 2018-12-17 RX ORDER — AMLODIPINE BESYLATE 2.5 MG/1
2.5 TABLET ORAL DAILY
Status: DISCONTINUED | OUTPATIENT
Start: 2018-12-18 | End: 2018-12-18 | Stop reason: HOSPADM

## 2018-12-17 RX ORDER — MAGNESIUM HYDROXIDE/ALUMINUM HYDROXICE/SIMETHICONE 120; 1200; 1200 MG/30ML; MG/30ML; MG/30ML
30 SUSPENSION ORAL EVERY 6 HOURS PRN
Status: DISCONTINUED | OUTPATIENT
Start: 2018-12-17 | End: 2018-12-18 | Stop reason: HOSPADM

## 2018-12-17 RX ORDER — OXYCODONE HYDROCHLORIDE AND ACETAMINOPHEN 5; 325 MG/1; MG/1
2 TABLET ORAL EVERY 4 HOURS PRN
Status: DISCONTINUED | OUTPATIENT
Start: 2018-12-17 | End: 2018-12-18 | Stop reason: HOSPADM

## 2018-12-17 RX ORDER — METHOCARBAMOL 750 MG/1
750 TABLET, FILM COATED ORAL 4 TIMES DAILY PRN
Status: DISCONTINUED | OUTPATIENT
Start: 2018-12-17 | End: 2018-12-18 | Stop reason: HOSPADM

## 2018-12-17 RX ORDER — CEFAZOLIN SODIUM 2 G/50ML
2000 SOLUTION INTRAVENOUS ONCE
Status: DISCONTINUED | OUTPATIENT
Start: 2018-12-17 | End: 2018-12-17 | Stop reason: HOSPADM

## 2018-12-17 RX ORDER — CHLORHEXIDINE GLUCONATE 0.12 MG/ML
15 RINSE ORAL ONCE
Status: COMPLETED | OUTPATIENT
Start: 2018-12-17 | End: 2018-12-17

## 2018-12-17 RX ORDER — VALACYCLOVIR HYDROCHLORIDE 500 MG/1
500 TABLET, FILM COATED ORAL 2 TIMES DAILY
Status: DISCONTINUED | OUTPATIENT
Start: 2018-12-17 | End: 2018-12-18 | Stop reason: HOSPADM

## 2018-12-17 RX ORDER — ONDANSETRON 2 MG/ML
4 INJECTION INTRAMUSCULAR; INTRAVENOUS EVERY 6 HOURS PRN
Status: DISCONTINUED | OUTPATIENT
Start: 2018-12-17 | End: 2018-12-18 | Stop reason: HOSPADM

## 2018-12-17 RX ORDER — OXYCODONE HYDROCHLORIDE AND ACETAMINOPHEN 5; 325 MG/1; MG/1
1 TABLET ORAL EVERY 4 HOURS PRN
Status: DISCONTINUED | OUTPATIENT
Start: 2018-12-17 | End: 2018-12-18 | Stop reason: HOSPADM

## 2018-12-17 RX ORDER — CEFAZOLIN SODIUM 2 G/50ML
2000 SOLUTION INTRAVENOUS ONCE
Status: COMPLETED | OUTPATIENT
Start: 2018-12-17 | End: 2018-12-17

## 2018-12-17 RX ORDER — NEOSTIGMINE METHYLSULFATE 1 MG/ML
INJECTION INTRAVENOUS AS NEEDED
Status: DISCONTINUED | OUTPATIENT
Start: 2018-12-17 | End: 2018-12-17 | Stop reason: SURG

## 2018-12-17 RX ORDER — HYDROMORPHONE HCL/PF 1 MG/ML
0.5 SYRINGE (ML) INJECTION
Status: DISCONTINUED | OUTPATIENT
Start: 2018-12-17 | End: 2018-12-17 | Stop reason: HOSPADM

## 2018-12-17 RX ORDER — LIDOCAINE HYDROCHLORIDE AND EPINEPHRINE 10; 10 MG/ML; UG/ML
INJECTION, SOLUTION INFILTRATION; PERINEURAL AS NEEDED
Status: DISCONTINUED | OUTPATIENT
Start: 2018-12-17 | End: 2018-12-17 | Stop reason: HOSPADM

## 2018-12-17 RX ORDER — PROPOFOL 10 MG/ML
INJECTION, EMULSION INTRAVENOUS AS NEEDED
Status: DISCONTINUED | OUTPATIENT
Start: 2018-12-17 | End: 2018-12-17 | Stop reason: SURG

## 2018-12-17 RX ORDER — METHYLPREDNISOLONE ACETATE 40 MG/ML
INJECTION, SUSPENSION INTRA-ARTICULAR; INTRALESIONAL; INTRAMUSCULAR; SOFT TISSUE AS NEEDED
Status: DISCONTINUED | OUTPATIENT
Start: 2018-12-17 | End: 2018-12-17 | Stop reason: HOSPADM

## 2018-12-17 RX ORDER — GINSENG 100 MG
CAPSULE ORAL AS NEEDED
Status: DISCONTINUED | OUTPATIENT
Start: 2018-12-17 | End: 2018-12-17 | Stop reason: HOSPADM

## 2018-12-17 RX ORDER — TAMSULOSIN HYDROCHLORIDE 0.4 MG/1
0.8 CAPSULE ORAL
Status: DISCONTINUED | OUTPATIENT
Start: 2018-12-17 | End: 2018-12-18 | Stop reason: HOSPADM

## 2018-12-17 RX ORDER — DOCUSATE SODIUM 100 MG/1
100 CAPSULE, LIQUID FILLED ORAL 2 TIMES DAILY
Status: DISCONTINUED | OUTPATIENT
Start: 2018-12-17 | End: 2018-12-18 | Stop reason: HOSPADM

## 2018-12-17 RX ORDER — MIDAZOLAM HYDROCHLORIDE 1 MG/ML
INJECTION INTRAMUSCULAR; INTRAVENOUS AS NEEDED
Status: DISCONTINUED | OUTPATIENT
Start: 2018-12-17 | End: 2018-12-17 | Stop reason: SURG

## 2018-12-17 RX ORDER — ROCURONIUM BROMIDE 10 MG/ML
INJECTION, SOLUTION INTRAVENOUS AS NEEDED
Status: DISCONTINUED | OUTPATIENT
Start: 2018-12-17 | End: 2018-12-17 | Stop reason: SURG

## 2018-12-17 RX ORDER — TAMSULOSIN HYDROCHLORIDE 0.4 MG/1
0.4 CAPSULE ORAL DAILY
Status: DISCONTINUED | OUTPATIENT
Start: 2018-12-18 | End: 2018-12-17

## 2018-12-17 RX ORDER — HYDRALAZINE HYDROCHLORIDE 20 MG/ML
10 INJECTION INTRAMUSCULAR; INTRAVENOUS
Status: DISCONTINUED | OUTPATIENT
Start: 2018-12-17 | End: 2018-12-17 | Stop reason: HOSPADM

## 2018-12-17 RX ORDER — ONDANSETRON 2 MG/ML
4 INJECTION INTRAMUSCULAR; INTRAVENOUS ONCE AS NEEDED
Status: DISCONTINUED | OUTPATIENT
Start: 2018-12-17 | End: 2018-12-17 | Stop reason: HOSPADM

## 2018-12-17 RX ORDER — FENTANYL CITRATE 50 UG/ML
INJECTION, SOLUTION INTRAMUSCULAR; INTRAVENOUS AS NEEDED
Status: DISCONTINUED | OUTPATIENT
Start: 2018-12-17 | End: 2018-12-17 | Stop reason: SURG

## 2018-12-17 RX ORDER — SODIUM CHLORIDE 9 MG/ML
INJECTION, SOLUTION INTRAVENOUS CONTINUOUS PRN
Status: DISCONTINUED | OUTPATIENT
Start: 2018-12-17 | End: 2018-12-17 | Stop reason: SURG

## 2018-12-17 RX ORDER — GLYCOPYRROLATE 0.2 MG/ML
INJECTION INTRAMUSCULAR; INTRAVENOUS AS NEEDED
Status: DISCONTINUED | OUTPATIENT
Start: 2018-12-17 | End: 2018-12-17 | Stop reason: SURG

## 2018-12-17 RX ORDER — HYDROMORPHONE HYDROCHLORIDE 2 MG/ML
INJECTION, SOLUTION INTRAMUSCULAR; INTRAVENOUS; SUBCUTANEOUS AS NEEDED
Status: DISCONTINUED | OUTPATIENT
Start: 2018-12-17 | End: 2018-12-17 | Stop reason: SURG

## 2018-12-17 RX ORDER — SODIUM CHLORIDE, SODIUM LACTATE, POTASSIUM CHLORIDE, CALCIUM CHLORIDE 600; 310; 30; 20 MG/100ML; MG/100ML; MG/100ML; MG/100ML
20 INJECTION, SOLUTION INTRAVENOUS CONTINUOUS
Status: DISCONTINUED | OUTPATIENT
Start: 2018-12-17 | End: 2018-12-18

## 2018-12-17 RX ORDER — ONDANSETRON 2 MG/ML
INJECTION INTRAMUSCULAR; INTRAVENOUS AS NEEDED
Status: DISCONTINUED | OUTPATIENT
Start: 2018-12-17 | End: 2018-12-17 | Stop reason: SURG

## 2018-12-17 RX ORDER — HYDROMORPHONE HCL/PF 1 MG/ML
0.2 SYRINGE (ML) INJECTION EVERY 6 HOURS PRN
Status: DISCONTINUED | OUTPATIENT
Start: 2018-12-17 | End: 2018-12-18

## 2018-12-17 RX ORDER — CEFAZOLIN SODIUM 1 G/50ML
1000 SOLUTION INTRAVENOUS EVERY 8 HOURS
Status: COMPLETED | OUTPATIENT
Start: 2018-12-18 | End: 2018-12-18

## 2018-12-17 RX ORDER — MAGNESIUM HYDROXIDE 1200 MG/15ML
LIQUID ORAL AS NEEDED
Status: DISCONTINUED | OUTPATIENT
Start: 2018-12-17 | End: 2018-12-17 | Stop reason: HOSPADM

## 2018-12-17 RX ORDER — BUPIVACAINE HYDROCHLORIDE 2.5 MG/ML
INJECTION, SOLUTION EPIDURAL; INFILTRATION; INTRACAUDAL AS NEEDED
Status: DISCONTINUED | OUTPATIENT
Start: 2018-12-17 | End: 2018-12-17 | Stop reason: HOSPADM

## 2018-12-17 RX ORDER — LIDOCAINE HYDROCHLORIDE 10 MG/ML
INJECTION, SOLUTION INFILTRATION; PERINEURAL AS NEEDED
Status: DISCONTINUED | OUTPATIENT
Start: 2018-12-17 | End: 2018-12-17 | Stop reason: SURG

## 2018-12-17 RX ORDER — SODIUM CHLORIDE, SODIUM LACTATE, POTASSIUM CHLORIDE, CALCIUM CHLORIDE 600; 310; 30; 20 MG/100ML; MG/100ML; MG/100ML; MG/100ML
75 INJECTION, SOLUTION INTRAVENOUS CONTINUOUS
Status: DISCONTINUED | OUTPATIENT
Start: 2018-12-17 | End: 2018-12-18

## 2018-12-17 RX ADMIN — HYDROMORPHONE HYDROCHLORIDE 0.5 MG: 2 INJECTION, SOLUTION INTRAMUSCULAR; INTRAVENOUS; SUBCUTANEOUS at 17:14

## 2018-12-17 RX ADMIN — SODIUM CHLORIDE, SODIUM LACTATE, POTASSIUM CHLORIDE, AND CALCIUM CHLORIDE: .6; .31; .03; .02 INJECTION, SOLUTION INTRAVENOUS at 17:30

## 2018-12-17 RX ADMIN — SODIUM CHLORIDE, SODIUM LACTATE, POTASSIUM CHLORIDE, AND CALCIUM CHLORIDE 75 ML/HR: .6; .31; .03; .02 INJECTION, SOLUTION INTRAVENOUS at 19:50

## 2018-12-17 RX ADMIN — LIDOCAINE HYDROCHLORIDE 100 MG: 10 INJECTION, SOLUTION INFILTRATION; PERINEURAL at 15:49

## 2018-12-17 RX ADMIN — HYDROMORPHONE HYDROCHLORIDE 0.5 MG: 1 INJECTION, SOLUTION INTRAMUSCULAR; INTRAVENOUS; SUBCUTANEOUS at 18:01

## 2018-12-17 RX ADMIN — ONDANSETRON 4 MG: 2 INJECTION INTRAMUSCULAR; INTRAVENOUS at 17:11

## 2018-12-17 RX ADMIN — LABETALOL HYDROCHLORIDE 5 MG: 5 INJECTION, SOLUTION INTRAVENOUS at 17:32

## 2018-12-17 RX ADMIN — LABETALOL HYDROCHLORIDE 5 MG: 5 INJECTION, SOLUTION INTRAVENOUS at 17:47

## 2018-12-17 RX ADMIN — SODIUM CHLORIDE, SODIUM LACTATE, POTASSIUM CHLORIDE, AND CALCIUM CHLORIDE 20 ML/HR: .6; .31; .03; .02 INJECTION, SOLUTION INTRAVENOUS at 13:52

## 2018-12-17 RX ADMIN — HYDRALAZINE HYDROCHLORIDE 10 MG: 20 INJECTION INTRAMUSCULAR; INTRAVENOUS at 18:07

## 2018-12-17 RX ADMIN — GLYCOPYRROLATE 0.2 MG: 0.2 INJECTION, SOLUTION INTRAMUSCULAR; INTRAVENOUS at 15:32

## 2018-12-17 RX ADMIN — FENTANYL CITRATE 100 MCG: 50 INJECTION, SOLUTION INTRAMUSCULAR; INTRAVENOUS at 15:49

## 2018-12-17 RX ADMIN — ROCURONIUM BROMIDE 40 MG: 10 INJECTION INTRAVENOUS at 15:49

## 2018-12-17 RX ADMIN — MIDAZOLAM 2 MG: 1 INJECTION INTRAMUSCULAR; INTRAVENOUS at 15:32

## 2018-12-17 RX ADMIN — ROCURONIUM BROMIDE 10 MG: 10 INJECTION INTRAVENOUS at 16:41

## 2018-12-17 RX ADMIN — CHLORHEXIDINE GLUCONATE 15 ML: 1.2 RINSE ORAL at 13:49

## 2018-12-17 RX ADMIN — GLYCOPYRROLATE 0.6 MG: 0.2 INJECTION, SOLUTION INTRAMUSCULAR; INTRAVENOUS at 17:23

## 2018-12-17 RX ADMIN — OXYCODONE HYDROCHLORIDE AND ACETAMINOPHEN 1 TABLET: 5; 325 TABLET ORAL at 23:55

## 2018-12-17 RX ADMIN — CEFAZOLIN SODIUM 2000 MG: 2 SOLUTION INTRAVENOUS at 15:45

## 2018-12-17 RX ADMIN — DEXAMETHASONE SODIUM PHOSPHATE 10 MG: 10 INJECTION INTRAMUSCULAR; INTRAVENOUS at 15:55

## 2018-12-17 RX ADMIN — SODIUM CHLORIDE: 0.9 INJECTION, SOLUTION INTRAVENOUS at 16:20

## 2018-12-17 RX ADMIN — PROPOFOL 200 MG: 10 INJECTION, EMULSION INTRAVENOUS at 15:49

## 2018-12-17 RX ADMIN — LABETALOL HYDROCHLORIDE 5 MG: 5 INJECTION, SOLUTION INTRAVENOUS at 17:40

## 2018-12-17 RX ADMIN — VALACYCLOVIR HYDROCHLORIDE 500 MG: 500 TABLET, FILM COATED ORAL at 20:16

## 2018-12-17 RX ADMIN — LABETALOL HYDROCHLORIDE 5 MG: 5 INJECTION, SOLUTION INTRAVENOUS at 17:35

## 2018-12-17 RX ADMIN — DOCUSATE SODIUM 100 MG: 100 CAPSULE, LIQUID FILLED ORAL at 20:16

## 2018-12-17 RX ADMIN — NEOSTIGMINE METHYLSULFATE 3 MG: 1 INJECTION INTRAVENOUS at 17:23

## 2018-12-17 RX ADMIN — HYDROMORPHONE HYDROCHLORIDE 0.5 MG: 2 INJECTION, SOLUTION INTRAMUSCULAR; INTRAVENOUS; SUBCUTANEOUS at 17:32

## 2018-12-17 RX ADMIN — ROCURONIUM BROMIDE 20 MG: 10 INJECTION INTRAVENOUS at 16:12

## 2018-12-17 RX ADMIN — TAMSULOSIN HYDROCHLORIDE 0.8 MG: 0.4 CAPSULE ORAL at 22:20

## 2018-12-17 NOTE — ANESTHESIA PREPROCEDURE EVALUATION
Review of Systems/Medical History  Patient summary reviewed  Chart reviewed  No history of anesthetic complications     Cardiovascular  Exercise tolerance (METS): >4,  Hypertension , No CAD , No cardiac stents     Pulmonary  Not a smoker , No asthma , No recent URI , No sleep apnea ,        GI/Hepatic            Endo/Other     GYN       Hematology   Musculoskeletal       Neurology    No TIA, No CVA ,    Psychology         12/12/18 2012 MRI lumbar spine w wo contrast    Impression:       1  L3-4 paracentral to right subarticular zone disc extrusion resulting in severe mass effect on the traversing right L4 nerve root  2  Multilevel disc and facet degenerative change in the lower lumbar spine resulting in chronic nerve root encroachment  Physical Exam    Airway    Mallampati score: II  TM Distance: >3 FB       Dental   No notable dental hx     Cardiovascular      Pulmonary      Other Findings      Lab Results   Component Value Date    WBC 13 17 (H) 12/14/2018    HGB 15 1 12/14/2018     12/14/2018     Lab Results   Component Value Date     08/26/2015    K 4 1 12/13/2018    BUN 21 12/13/2018    CREATININE 1 14 12/13/2018    GLUCOSE 98 08/26/2015     Lab Results   Component Value Date    PTT 29 12/13/2018      Lab Results   Component Value Date    INR 1 02 12/13/2018       No results found for: HGBA1C      Anesthesia Plan  ASA Score- 2     Anesthesia Type- general with ASA Monitors  Additional Monitors:   Airway Plan: ETT  Comment:  AMARILIS Osman , have personally seen and evaluated the patient prior to anesthetic care  I have reviewed the pre-anesthetic record, and other medical records if appropriate to the anesthetic care  If a CRNA is involved in the case, I have reviewed the CRNA assessment, if present, and agree        Risks/benefits and alternatives discussed with patient including possible PONV, sore throat, and possibility of rare anesthetic and surgical emergencies  TWO EGGS/TOAST 0615; D/W DR HART, THERE IS AN URGENCY TO THE PROCEDURE DUE TO FOOTDROP, PLAN TO PROCEED WITH RSI AFTER COUNSELING PATIENT ABOUT INCREASED RISK OF ASPIRATION;  PATIENT O/W WITHOUT RF FOR DELAYED GASTRIC EMPTYING AND CLASS 1 AIRWAY        Plan Factors- Patient instructed to abstain from smoking on day of procedure  Patient did not smoke on day of surgery  Induction- intravenous and rapid sequence induction  Postoperative Plan- Plan for postoperative opioid use  Planned trial extubation    Informed Consent- Anesthetic plan and risks discussed with patient  I personally reviewed this patient with the CRNA  Discussed and agreed on the Anesthesia Plan with the CRNA  Radha Carver

## 2018-12-17 NOTE — OP NOTE
OPERATIVE REPORT  PATIENT NAME: Timothy Joel    :  1960  MRN: [id number]  Pt Location: [facility] OR ROOM 17    SURGERY DATE: 2018    Surgeon(s) and Role:     * Jazmín Bradley MD - Primary     * Mary Ann Heard PA-C - Assisting    Preop Diagnosis:  Right foot drop [M21 371]    Post-Op Diagnosis Codes:     * Right foot drop [M21 371]    Procedure(s) (LRB):  LAMINECTOMY LUMBAR LAMINECTOMY, DISCECTOMY, FORAMINOTOMY, L3-4, RIGHT (Right)    Specimen(s):  ID Type Source Tests Collected by Time Destination   1 : L3-L4 Disc Tissue Intervertebral Disc TISSUE EXAM Jazmín Bradley MD 2018 1707        Estimated Blood Loss:   Minimal    Drains:       Anesthesia Type:   General    Operative Indications:  Right foot drop [M21 371]  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    Operative Findings:  Extruded disc fragment    Complications:   None    Procedure and Technique:  The patient was taken out the operative theatre and induced under general anesthesia intubated he was positioned prone on gell rolls   We used the old incision and marked a midline lumbar incision over the roughly the L3-4 disc space   He has prepped and draped in sterile fashion   Incision with a 10  Blade, dissected down using cautery   I dissected down to expose the interlaminar space of L3 and 4 and this was confirmed on fluoro   I then performed a laminotomy using the drill and Milagros Russ well as a medial facetectomy and laminotomy/foraminotomy of the L3 and L4 lamina   I was able to remove the yellow ligament and expose the underlying thecal sac   I could see the downgoing L4 nerve root   Which was quite decompressed   I then retracted the thecal sac medially including the L4 nerve root and exposed the L2-3 disc space I left a Penfield 4 and confirm fluoro that this was in fact L3-4 disc space   I immediately removed a large disc fragment and this was sent to pathology, inspecting the area with a nerve hook I did not appreciated any additional free disc fragment I then incised the L3-4 disc space and noted no residual disc fragment in the disc space       The nerve was quite decompressed therefore I decided to end I was satisfied with decompression  I obtained hemostasis and left some Depo-Medrol was in the epidural space   I then proceeded to close in layers with 0 Vicryl for the fascia   Two 0 Vicryl for the subcutaneous tissues and Nylon for the skin   A dressing was applied   The Patient was repositioned supine I hospital bed and taken to the postop recovery area stable condition   All needle and sponge counts were correct at the end of the procedure       I was present for the entire procedure, A qualified resident physician was not available and A physician assistant was required during the procedure for retraction tissue handling,dissection and suturing    Patient Disposition:  PACU     SIGNATURE: Artem Foley MD  DATE: December 17, 2018  TIME: 5:54 PM

## 2018-12-17 NOTE — H&P (VIEW-ONLY)
H&P- Breanna Richardson 1960, 60 y o  male MRN: [id number]    Unit/Bed#: [facility] Encounter: [id number]    Primary Care Provider: Asim Medina MD   Date and time admitted to hospital: 12/12/2018  3:00 PM        * Footdrop   Assessment & Plan    Patient presents with 4-5 days history of back pain followed by right footdrop  Has history of similar symptoms of the left side and underwent surgery in [address] in the past for that  He however remains with left-sided foot drop  Denies any fall or trauma to the back  MRI of the brain done in emergency room shows L3-4 paracentral to right subarticular zone disc extrusion resulting in severe mass effect on the traversing right L4 nerve root  2  Multilevel disc and facet degenerative change in the lower lumbar spine resulting in chronic nerve root encroachment  These findings were discussed by the emergency room physician with Neurosurgery  Patient will be scheduled for surgical decompression tomorrow  Continue with serial neuro exam   Continue with IV Decadron  NPO past midnight  Bladder cancer [facility])   Assessment & Plan    Patient has been treated for that in the past   Outpatient Urology follow-up  Benign nodular prostatic hyperplasia with lower urinary tract symptoms   Assessment & Plan    Maintained on Flomax  Hypertension   Assessment & Plan    Continue with amlodipine  VTE Prophylaxis: Enoxaparin (Lovenox)  / sequential compression device   Code Status:  Full code  POLST: POLST form is not discussed and not completed at this time  Discussion with family:  Discussed with patient at length  Anticipated Length of Stay:  Patient will be admitted on an Inpatient basis with an anticipated length of stay of  > 2 midnights  Justification for Hospital Stay: neurosurgical evaluation/ possible need for surgery    Total Time for Visit, including Counseling / Coordination of Care: 45 minutes    Greater than 50% of this total time spent on direct patient counseling and coordination of care  Chief Complaint:   Back pain  History of Present Illness:    Daly Castro is a 60 y o  male who presents with 4-5 day history of back pain with started well patient felt that her back muscle 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  This is a was subsequently followed by stan Bradley and he noticed that he had a right foot drop  Patient states that he has had similar symptoms resulting in left foot drop from a back injury sustained in 2002  He subsequently underwent surgery however never regained his left footdorsiflexion completely   He currently denies any trauma to the back or any falls  He went to see his primary care physician yesterday and saw the nurse practitioner and was recommended an outpatient MRI  However patient felt that he needed a more urgent MRI because of his prior experience with left foot  He denies any worsening back pain  Denies any bowel or bladder disturbances  Complains of occasional numbness of the right lateral aspect of the thigh but denies any weakness other than the foot drop  Review of Systems:    Review of Systems   Constitutional: Positive for activity change  HENT: Negative  Eyes: Negative  Respiratory: Negative  Cardiovascular: Negative  Gastrointestinal: Negative  Endocrine: Negative  Genitourinary: Positive for frequency  Musculoskeletal: Positive for back pain and gait problem  Skin: Negative  Allergic/Immunologic: Negative  Neurological: Positive for weakness and numbness         Past Medical and Surgical History:     Past Medical History:   Diagnosis Date    Dry skin dermatitis     Dyspepsia     last assessed 09/27/17    Hypertension        Past Surgical History:   Procedure Laterality Date    BACK SURGERY Left     left with foot drop after surgery    BLADDER SURGERY         Meds/Allergies:    Prior to Admission medications    Medication Sig Start Date End Date Taking? Authorizing Provider   amLODIPine (NORVASC) 2 5 mg tablet Take 1 tablet (2 5 mg total) by mouth daily 5/18/18   Sadnie Palma MD   betamethasone dipropionate 0 05 % lotion Apply 1 application topically 2 (two) times a day To affected area 11/2/18   Historical Provider, MD   ciprofloxacin (CIPRO) 500 mg tablet Take 500 mg by mouth 2 (two) times a day 3/19/18   Historical Provider, MD   clotrimazole-betamethasone (LOTRISONE) 1-0 05 % cream Use as directed 11/17/18   Historical Provider, MD   ketoconazole (NIZORAL) 2 % cream APPLY TO AFFECTED AREA TWICE A DAY TO RASH IN SKIN FOLDS  11/27/18   Historical Provider, MD   omeprazole (PriLOSEC) 20 mg delayed release capsule TAKE 1 CAPSULE (20 MG) BY MOUTH DAILY 30 MINUTES BEFORE MORNING MEAL 10/3/18   Historical Provider, MD   PROCTOSOL HC 2 5 % rectal cream MIX WITH KETOCONAZOLE AND APPLY TO AFFECTED AREAS IN SKIN FOLDS TWICE DAILY FOR 2 WEEKS 12/3/18   Historical Provider, MD   ranitidine (ZANTAC) 150 mg tablet Take 1 tablet by mouth 2 (two) times a day 9/22/17   Historical Provider, MD   tamsulosin (FLOMAX) 0 4 mg Take 1 capsule by mouth daily    Historical Provider, MD   valACYclovir (VALTREX) 500 mg tablet Take 500 mg by mouth 2 (two) times a day 2/3/18   Historical Provider, MD     I have reviewed home medications with patient personally      Allergies: No Known Allergies    Social History:     Marital Status:    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 Use History:   History   Alcohol Use No     History   Smoking Status    Never Smoker   Smokeless Tobacco    Never Used     History   Drug Use No       Family History:    non-contributory    Physical Exam:     Vitals:   Blood Pressure: 154/80 (12/12/18 1337)  Pulse: 74 (12/12/18 1337)  Temperature: (!) 97 4 °F (36 3 °C) (12/12/18 1337)  Temp Source: Tympanic (12/12/18 1337)  Respirations: 18 (12/12/18 1337)  Weight - Scale: 81 6 kg (180 lb) (12/12/18 1338)  SpO2: 98 % (12/12/18 6987)    Physical Exam   Constitutional: He is oriented to person, place, and time  No distress  HENT:   Head: Normocephalic and atraumatic  Mouth/Throat: Oropharynx is clear and moist    Eyes: Pupils are equal, round, and reactive to light  Conjunctivae are normal    Neck: Normal range of motion  Neck supple  Cardiovascular: Normal rate and regular rhythm  Pulmonary/Chest: Effort normal and breath sounds normal    Abdominal: Soft  Bowel sounds are normal    Musculoskeletal: He exhibits deformity  Neurological: He is alert and oriented to person, place, and time  Right foot drop  Old left foot drop   Skin: Skin is warm and dry  Rash noted  He is not diaphoretic  Additional Data:     Lab Results: I have personally reviewed pertinent reports  Imaging: I have personally reviewed pertinent reports  MRI lumbar spine w wo contrast   Final Result by Monie Gomez MD (12/12 2152)         1  L3-4 paracentral to right subarticular zone disc extrusion resulting in severe mass effect on the traversing right L4 nerve root  2   Multilevel disc and facet degenerative change in the lower lumbar spine resulting in chronic nerve root encroachment  Workstation performed: [id number]         MRI lumbar spine w wo contrast    (Results Pending)       EKG, Pathology, and Other Studies Reviewed on Admission:   · EKG:NA    Allscripts / Epic Records Reviewed: Yes     ** Please Note: This note has been constructed using a voice recognition system   **

## 2018-12-17 NOTE — ANESTHESIA POSTPROCEDURE EVALUATION
Post-Op Assessment Note      CV Status:  Stable    Mental Status:  Alert and awake    Hydration Status:  Euvolemic    PONV Controlled:  Controlled    Airway Patency:  Patent    Post Op Vitals Reviewed: Yes          Staff: GLADYS           BP (!) 179/80 (12/17/18 1745)    Temp 97 9 °F (36 6 °C) (12/17/18 1745)    Pulse 74 (12/17/18 1745)   Resp 20 (12/17/18 1745)    SpO2

## 2018-12-17 NOTE — UTILIZATION REVIEW
Notification of Discharge  This is a Notification of Discharge from our facility [address]  Please be advised that this patient has been discharge from our facility  Below you will find the admission and discharge date and time including the patients disposition  PRESENTATION DATE: 12/12/2018  3:00 PM  IP ADMISSION DATE: 12/12/18 2230  DISCHARGE DATE: 12/14/2018  1:31 PM  DISPOSITION: [facility] Utilization Review Department  Phone: [phone]; Fax [phone]  ATTENTION: Please call with any questions or concerns to [phone]  and carefully listen to the prompts so that you are directed to the right person  Send all requests for admission clinical reviews, approved or denied determinations and any other requests to fax [phone]   All voicemails are confidential

## 2018-12-18 ENCOUNTER — APPOINTMENT (OUTPATIENT)
Dept: RADIOLOGY | Facility: HOSPITAL | Age: 58
End: 2018-12-18
Attending: NEUROLOGICAL SURGERY
Payer: COMMERCIAL

## 2018-12-18 ENCOUNTER — TELEPHONE (OUTPATIENT)
Dept: NEUROSURGERY | Facility: CLINIC | Age: 58
End: 2018-12-18

## 2018-12-18 VITALS
RESPIRATION RATE: 18 BRPM | WEIGHT: 180 LBS | DIASTOLIC BLOOD PRESSURE: 76 MMHG | BODY MASS INDEX: 22.38 KG/M2 | TEMPERATURE: 98.1 F | OXYGEN SATURATION: 97 % | HEART RATE: 63 BPM | SYSTOLIC BLOOD PRESSURE: 158 MMHG | HEIGHT: 75 IN

## 2018-12-18 PROCEDURE — 72100 X-RAY EXAM L-S SPINE 2/3 VWS: CPT

## 2018-12-18 PROCEDURE — G8979 MOBILITY GOAL STATUS: HCPCS

## 2018-12-18 PROCEDURE — 97161 PT EVAL LOW COMPLEX 20 MIN: CPT

## 2018-12-18 PROCEDURE — G8978 MOBILITY CURRENT STATUS: HCPCS

## 2018-12-18 RX ORDER — OXYCODONE HYDROCHLORIDE AND ACETAMINOPHEN 5; 325 MG/1; MG/1
TABLET ORAL
Qty: 15 TABLET | Refills: 0 | Status: SHIPPED | OUTPATIENT
Start: 2018-12-18 | End: 2019-04-12

## 2018-12-18 RX ADMIN — VALACYCLOVIR HYDROCHLORIDE 500 MG: 500 TABLET, FILM COATED ORAL at 08:16

## 2018-12-18 RX ADMIN — HYDROMORPHONE HYDROCHLORIDE 0.2 MG: 1 INJECTION, SOLUTION INTRAMUSCULAR; INTRAVENOUS; SUBCUTANEOUS at 03:23

## 2018-12-18 RX ADMIN — OXYCODONE HYDROCHLORIDE AND ACETAMINOPHEN 1 TABLET: 5; 325 TABLET ORAL at 09:04

## 2018-12-18 RX ADMIN — DOCUSATE SODIUM 100 MG: 100 CAPSULE, LIQUID FILLED ORAL at 08:16

## 2018-12-18 RX ADMIN — CEFAZOLIN SODIUM 1000 MG: 1 SOLUTION INTRAVENOUS at 00:24

## 2018-12-18 RX ADMIN — ENOXAPARIN SODIUM 40 MG: 40 INJECTION SUBCUTANEOUS at 08:16

## 2018-12-18 RX ADMIN — AMLODIPINE BESYLATE 2.5 MG: 2.5 TABLET ORAL at 08:16

## 2018-12-18 RX ADMIN — CEFAZOLIN SODIUM 1000 MG: 1 SOLUTION INTRAVENOUS at 08:16

## 2018-12-18 NOTE — SOCIAL WORK
CM reviewed pt at care coordination rounds  Pt is here as a planned readmission for surgery  Pt is currently considered as OP  CM to follow for change in status and any discharge planning needs  Per neurosurgery PA pt does not require PT/OT consult for discharge

## 2018-12-18 NOTE — PROGRESS NOTES
Progress Note - Neurosurgery   Giuseppe Shah 60 y o  male MRN: [id number]  Unit/Bed#: [facility] 907-01 Encounter: [id number]    Assessment:  1  POD#1 L3-4 laminectomy, discectomy, and foraminotomy  2  Right foot drop  3  BPH  4  [place] syndrome  5  Hx of bladder cancer  6  Hypertension     Plan:  · Exam:  Alert and oriented x3, bilateral footdrop, 0/5 dorsiflexion bilaterally, reflexes 2+ and symmetrical, no Fozia or clonus noted, no drift, tenderness to palpation along incision, dressing slightly saturated with serosanguineous fluid with some edema surrounding incision  · Imaging reviewed personally and by attending  Results are as follows:  · XR spine lumbar 12/18/2018:  No acute findings, alignment looks stable when compared to MRI lumbar spine  · Pain control - well tolerated on current regimen  · Discontinue dilaudid  · Continue percocet 5-325mg q4hrs for moderate and severe pain  · Patient may remove dressing tomorrow and continue daily dressing changes or when dressing is more than 50% saturated  · Patient may start showering on postop day 3  · IV fluids discontinued as patient is tolerating PO fluids  · Mobilize as tolerated with assistance  · Script given to patient for outpatient PT - will hold off on being evaluated by Physical therapy until seen for 2 week appointment outpatient   · DVT PPX: SCDs and Lovenox  · Neurosurgery will continue to follow as primary team  · Patient is medically cleared for discharge  · Will follow up outpatient in 2 weeks for an incision check and 6 weeks with Dr Sharon Siddiqi   · Please call with questions    Subjective/Objective   Chief Complaint: "I feel okay " / POD#1 L3-4 laminectomy, discectomy, and foraminotomy    Subjective: Patient currently has 6-7/10 incisional back pain  He took a percocet just prior to examination  He reports no improvements in foot drop  He is eating okay  He is urinating but states he feels he cannot empty fully    He has not had a BM yet and states he has not really passed gas  Denies new weakness / numbness / tingling, headaches, dizziness, CP, SOB, abdominal pain, N/V     Nursing rounds completed with Desmond December - no significant overnight event  Patient is still receiving stool softeners  Complained of pain last night and was given dilaudid  Objective: laying in bed, NAD  I/O       12/16 0701 - 12/17 0700 12/17 0701 - 12/18 0700 12/18 0701 - 12/19 0700    P  O   120     I V  (mL/kg)  2400 (29 4)     Total Intake(mL/kg)  2520 (30 9)     Urine (mL/kg/hr)  450     Blood  10     Total Output   460      Net   +2060             Unmeasured Urine Occurrence  2 x           Invasive Devices     Peripheral Intravenous Line            Peripheral IV 12/17/18 Left Hand less than 1 day                Physical Exam:  Vitals: Blood pressure 134/72, pulse 66, temperature 98 4 °F (36 9 °C), temperature source Oral, resp  rate 18, height 6' 3" (1 905 m), weight 81 6 kg (180 lb), SpO2 96 %  ,Body mass index is 22 5 kg/m²      General appearance: alert, appears stated age, cooperative and no distress  Head: Normocephalic, without obvious abnormality, atraumatic  Eyes: EOMI, PERRL  Neck: supple, symmetrical, trachea midline and NT  Back: no kyphosis present, tenderness to palpation along incision line, dressing over the incision is slightly saturated with serosanguineous fluid, slight edema around incision  Lungs: non labored breathing  Heart: regular heart rate  Neurologic:   Mental status: Alert, oriented x3, follows commands, able to do simple calculations, thought content appropriate  Cranial nerves: grossly intact (Cranial nerves II-XII)  Sensory: normal to LT and PP, JPS 3/3, DST intact  Motor: moving all extremities without focal weakness, strength 5/5 except as noted   BLE:  0/5 DF  Reflexes: 2+ and symmetric, no giordano or clonus noted  Coordination: finger to nose normal bilaterally, no drift bilaterally    Lab Results:    Results from last 7 days  Lab Units 12/14/18  0441 12/13/18  0442   WBC Thousand/uL 13 17* 9 97   HEMOGLOBIN g/dL 15 1 15 3   HEMATOCRIT % 45 7 47 1   PLATELETS Thousands/uL 236 231   NEUTROS PCT % 90* 89*   MONOS PCT % 4 2*       Results from last 7 days  Lab Units 12/13/18  0442   POTASSIUM mmol/L 4 1   CHLORIDE mmol/L 105   CO2 mmol/L 28   BUN mg/dL 21   CREATININE mg/dL 1 14   CALCIUM mg/dL 8 5               Results from last 7 days  Lab Units 12/13/18  0001   INR  1 02   PTT seconds 29     No results found for: TROPONINT  ABG:No results found for: PHART, ODK0QRZ, PO2ART, YEN6QME, K5NLZNTG, BEART, SOURCE    Imaging Studies: I have personally reviewed pertinent reports  and I have personally reviewed pertinent films in PACS    EKG, Pathology, and Other Studies: I have personally reviewed pertinent reports        VTE Pharmacologic Prophylaxis: Enoxaparin (Lovenox)    VTE Mechanical Prophylaxis: sequential compression device

## 2018-12-18 NOTE — UTILIZATION REVIEW
Initial Clinical Review    Age/Sex: 60 y o  male admitted on 12/17 for elective surgery - OR    Surgery Date: 12/17    Procedure: S/P LAMINECTOMY LUMBAR LAMINECTOMY, DISCECTOMY, FORAMINOTOMY, L3-4, RIGHT (Right)    Anesthesia: General    Admission Orders: Date/Time/Statement: Outpatient No Charge Bed 12/17 @ 1812    Vital Signs: /72 (BP Location: Right arm)   Pulse 66   Temp 98 4 °F (36 9 °C) (Oral)   Resp 18   Ht 6' 3" (1 905 m)   Wt 81 6 kg (180 lb)   SpO2 96%   BMI 22 50 kg/m²     Diet:        Diet Orders            Start     Ordered    12/17/18 1928  Diet Regular; Regular House  Diet effective now     Question Answer Comment   Diet Type Regular    Regular Regular House    RD to adjust diet per protocol?  No        12/17/18 1930          Mobility: OOB    DVT Prophylaxis: Sequential compression device    Scheduled Meds:  Current Facility-Administered Medications:  Cefazolin  Intravenous x3   amLODIPine 2 5 mg Oral Daily   docusate sodium 100 mg Oral BID   enoxaparin 40 mg Subcutaneous Daily   tamsulosin 0 8 mg Oral After Dinner   valACYclovir 500 mg Oral BID     Continuous Infusions:  lactated ringers 20 mL/hr Last Rate: Stopped (12/17/18 1948)   lactated ringers 75 mL/hr Last Rate: 75 mL/hr (12/17/18 1950)     PRN Meds:    methocarbamol    ondansetron    oxyCODONE-acetaminophen   Dilaudid Iv x1

## 2018-12-18 NOTE — PHYSICAL THERAPY NOTE
Physical Therapy Evaluation    Patient's Name:Lionel Villeda    Today's Date:12/18/18    Patient Active Problem List   Diagnosis    Hypertension    BPH    History of bladder cancer    Right footdrop due to L3-L4 disc protrusion     [place] syndrome    Right foot drop       Past Medical History:   Diagnosis Date    Dry skin dermatitis     Dyspepsia     last assessed 09/27/17    Hypertension        Past Surgical History:   Procedure Laterality Date    BACK SURGERY Left     left with foot drop after surgery    BLADDER SURGERY      POSTERIOR LAMINECTOMY THORACIC AND LUMBAR SPINE Right 12/17/2018    Procedure: LAMINECTOMY LUMBAR LAMINECTOMY, DISCECTOMY, FORAMINOTOMY, L3-4, RIGHT;  Surgeon: Sergey Quiroga MD;  Location: [facility];  Service: Neurosurgery          12/18/18 1330   Pain Assessment   Pain Assessment 0-10   Pain Score 5   Pain Type Acute pain;Surgical pain   Pain Location Back   Hospital Pain Intervention(s) Ambulation/increased activity   Response to Interventions tolerated   Home Living   Type of Home House   Home Layout Multi-level   Prior Function   Level of [place] Independent with ADLs and functional mobility   Lives With Other (Comment)  (house )   Receives Help From (limited ssupport)   Restrictions/Precautions   Other Precautions Pain;Spinal precautions   Cognition   Arousal/Participation Alert   Orientation Level Oriented X4   RUE Assessment   RUE Assessment WFL   LUE Assessment   LUE Assessment WFL   RLE Assessment   RLE Assessment X   Strength RLE   RLE Overall Strength 3+/5  (+ foot drop)   LLE Assessment   LLE Assessment X   Strength LLE   LLE Overall Strength 3+/5  (+ foot drop)   Coordination   Movements are Fluid and Coordinated 0   Bed Mobility   Supine to Sit 6  Modified independent   Transfers   Sit to Stand 5  Supervision   Stand to Sit 5  Supervision   Stand pivot 5  Supervision   Ambulation/Elevation   Gait pattern Inconsistent price   Gait Assistance 5 Supervision   Assistive Device None   Distance 250 ft   Stair Management Assistance 5  Supervision   Stair Management Technique Alternating pattern   Number of Stairs 12   Balance   Static Standing Fair +   Dynamic Standing Fair   Activity Tolerance   Activity Tolerance Patient limited by pain   Nurse Made Aware yes   Assessment   Prognosis Good   Assessment pt referred to PT s/p spine sx exhibits supervised transf and amb 250 ft- gait w postural guarding + bilat foot drop compensated, no LOB noted on level surface and onturns; appropriately cautious around environ obstacles; pt appropriate for d/c home when med cleared; pt educated on spine precautions   Goals   Patient Goals go home   Recommendation   Recommendation D/C PT   PT - OK to Discharge Yes   Barthel Index   Feeding 10   Bathing 0   Grooming Score 0   Dressing Score 0   Bladder Score 10   Bowels Score 10   Toilet Use Score 5   Transfers (Bed/Chair) Score 10   Mobility (Level Surface) Score 10   Stairs Score 5   Barthel Index Score 60

## 2018-12-18 NOTE — DISCHARGE SUMMARY
Discharge Summary - Neurosurgery   Jose L Alanis 60 y o  male MRN: [id number]  [facility] Encounter: [id number]    Admission Date:  12/17/2018    Discharge Date: 12/18/2018    Admitting Diagnosis: Right foot drop [M21 371]    Discharge Diagnosis:   1  S/p L3-4 laminectomy, discectomy, and foraminotomy  2  Right foot drop  3  Chronic left foot drop  4  BPH  5  [place] syndrome  6  Hx of bladder cancer  7  Hypertension     Resolved Problems  Date Reviewed: 12/14/2018    None          Attending: Tate Hutton MD    Consulting Physician(s): Sivakumar Banks MD (anesthesiologist)    Procedures Performed: LAMINECTOMY LUMBAR LAMINECTOMY, DISCECTOMY, FORAMINOTOMY, L3-4, RIGHT (Right)    Radiology:   XR lumbar spine 2 or 3 views   Final Result      No acute findings  Multilevel degenerative and chronic changes as above  Workstation performed: [id number]           [place] [facility] Course: Darian Covarrubias is a 59 y/o male who presented as an inpatient complaining of right foot drop and back pain  MRI lumbar spine revealed L3-4 paracentral to right subarticular zone disc extrusion resulting in sevre mass effect on the traversing right L4 nerve root  Patient underwent L3-4 right laminectomy, discectomy, and foraminotomy under general anesthesia with minimal blood loss and no complications  Patient was kept in the PACU until stable and then moved to the floor  Patient received XR lumbar spine postoperatively which revealed stable alignment when compared to pre-procedure MRI and no acute osseous findings  Patient was cleared medically for discharge  Prior to discharge, postoperative instructions were discussed with patient  During that time, all questions and concerns were addressed  Patient will follow up outpatient in 2 weeks for an incision check and 6 weeks with Dr Natasha Wesley      Condition at Discharge: good     Discharge instructions/Information to patient and family:   See after visit summary for information provided to patient and family  Provisions for Follow-Up Care:  See after visit summary for information related to follow-up care and any pertinent home health orders  Disposition: Home        Planned Readmission: No    Discharge Statement   I spent 25 minutes discharging the patient  This time was spent on the day of discharge  I had direct contact with the patient on the day of discharge  Additional documentation is required if more than 30 minutes were spent on discharge  Discharge Medications:  See after visit summary for reconciled discharge medications provided to patient and family

## 2018-12-18 NOTE — TELEPHONE ENCOUNTER
12/20/2018-PLEASE SEE AMBERLY'S 12/18/2018 TELEPHONE NOTE    12/18/2018-DIANNA (12/14/2018)PT D/C TO HOME  WILL BE READMITTED ON Monday 12/17/2018 FOR MIS LUMBAR DECOMPRESSIVE SURGERY      [address]

## 2018-12-18 NOTE — DISCHARGE INSTRUCTIONS
Discharge Instructions - Neurosurgery     Please keep incision clean and dry  Avoid applying creams, lotion or antiseptic to incision area   If you have steri-strips you may remove them after 14 days if they do not fall off   Check the wound daily  If the incision becomes red, swollen, tender, warm, or has increased drainage please notify MD immediately   May shower 3 days after surgery, but do not soak in a tub and no swimming   Pat incision dry after showering   No bending or heavy lifting greater than 10lbs   No prolonged sitting greater than 30 minutes, but may walk as tolerated   Please take pain medications to relieve incision pain, and muscle relaxants to prevent spasms as directed by MD or Extender  See Med  Rec  completed at the time of discharge   No driving for 2 weeks or longer if taking narcotics or muscle relaxers   OK to be passenger in car for short distances   If taking Coumadin, Aspirin, or Plavix, you may resume these medications when cleared by Neurosurgery   To avoid constipation while on narcotics increase your water and fiber intake   May use over the counter stool softeners such as colace and senna   Limit steps to 2-3 times a day   Continue use of Incentive spirometer   Return for your follow up appointment in 2 weeks for an incision check and staple/suture removal as scheduled  Follow-up 6 weeks after surgery as scheduled  **Please notify MD if you experience a fever 101  F, chills or have increased pain, numbness, or weakness in your legs**

## 2018-12-19 ENCOUNTER — TELEPHONE (OUTPATIENT)
Dept: NEUROSURGERY | Facility: CLINIC | Age: 58
End: 2018-12-19

## 2018-12-19 NOTE — TELEPHONE ENCOUNTER
Called Beatrice Gagnon to see how he is doing after surgery on 12/17/18  He reports that he is doing very well overall and denies any incisional issues or fevers  Patient denies any bleeding, dizziness, fever, redness, significant pain and swelling  Verified date/time/location of his upcoming POV and advised him to call the office with any further questions or concerns, or if any incisional issues or fevers would arise  Went over incision care with patient and he has no further questions at this time  Patient was appreciative for the call

## 2018-12-20 ENCOUNTER — TELEPHONE (OUTPATIENT)
Dept: NEUROSURGERY | Facility: CLINIC | Age: 58
End: 2018-12-20

## 2018-12-20 DIAGNOSIS — Z98.890 STATUS POST SURGERY: Primary | ICD-10-CM

## 2018-12-20 DIAGNOSIS — Z98.890 STATUS POST SPINAL SURGERY: ICD-10-CM

## 2018-12-20 RX ORDER — CEPHALEXIN 500 MG/1
CAPSULE ORAL
Qty: 12 CAPSULE | Refills: 0 | Status: SHIPPED | OUTPATIENT
Start: 2018-12-20 | End: 2018-12-20 | Stop reason: RX

## 2018-12-20 RX ORDER — CEPHALEXIN 500 MG/1
CAPSULE ORAL
Qty: 12 CAPSULE | Refills: 0 | Status: SHIPPED | OUTPATIENT
Start: 2018-12-20 | End: 2018-12-23

## 2018-12-20 NOTE — TELEPHONE ENCOUNTER
Patient returned call reports he cannot  medication and [facility] pharmacy does not deliver  Request I send script to [facility] pharmacy     Telephoned [facility] cancel script  Will Resend E-script to requested pharmacy

## 2018-12-20 NOTE — TELEPHONE ENCOUNTER
Telephoned patient for status check  Reviewed postoperative medication regimen --reports has percocet --at discharge mentioned ordering muscle relaxer but never ordered   He dose not have ABX ordered   Did not e-script postoperative medications as there was some question about doing surgery Monday evening secondary to patient eating breakfast  He was scheduled for a late afternoon surgery  ABX ordered today , explained he should start today if possible and take for 3 days  Reported he is changing back dressing every day since Wednesday , took shower today and was told to keep dressing on incision secondary to sutures  Reviewed d/c instructions on AVS , advised patient to follow d/c instructions  Reports he has been up OOB , active and walking around his home   Reports he is doing very well after surgery  Pain well controlled, taking pain medication in the morning and at night  Has not taken pain medication yet today will likely take tonight  Denies back spasm , declined antispasmodic order  Advised to call office with questions or concerns  Aware of 2 week postoperative appointment 1/2/19  He verbalized an understanding of information communicated

## 2018-12-21 NOTE — TELEPHONE ENCOUNTER
Received a call from Alice Howe requesting clarification on post op wound instructions  Advised advised after 3 days may remove dressing and shower normally  Leave CARO if no drainage  No creams, ointments directly to incision  He was appreciative of the call

## 2018-12-21 NOTE — TELEPHONE ENCOUNTER
Patient telephoned this morning --informed me ABX will be delivered today---he will start   Questions if he should leave incision CARO , he is advised to follow discharge instruction received at time of d/c, =adsmitted has instructions and understand them---as per AVS --  Shower Daily  Patient may shower on 12/20/2018  Do not submerge incision in water  If wet pat dry  Change dressing (specify)  Dressing change starting 12/19/2018: 1 time per day or when more than 50% saturated using gauze and tape    Remove dressing in 24 hours  Dressing may be removed on 12/19/2018  Patient verbalized an understanding of conversation

## 2018-12-27 ENCOUNTER — TELEPHONE (OUTPATIENT)
Dept: OTHER | Facility: HOSPITAL | Age: 58
End: 2018-12-27

## 2018-12-27 ENCOUNTER — TELEPHONE (OUTPATIENT)
Dept: NEUROSURGERY | Facility: CLINIC | Age: 58
End: 2018-12-27

## 2018-12-27 ENCOUNTER — HOSPITAL ENCOUNTER (OUTPATIENT)
Dept: NON INVASIVE DIAGNOSTICS | Facility: HOSPITAL | Age: 58
Discharge: HOME | End: 2018-12-27
Payer: COMMERCIAL

## 2018-12-27 DIAGNOSIS — Z98.890 POST-OPERATIVE STATE: ICD-10-CM

## 2018-12-27 DIAGNOSIS — M79.662 BILATERAL CALF PAIN: ICD-10-CM

## 2018-12-27 DIAGNOSIS — M79.661 BILATERAL CALF PAIN: ICD-10-CM

## 2018-12-27 DIAGNOSIS — M79.662 BILATERAL CALF PAIN: Primary | ICD-10-CM

## 2018-12-27 DIAGNOSIS — M79.661 BILATERAL CALF PAIN: Primary | ICD-10-CM

## 2018-12-27 PROCEDURE — 93970 EXTREMITY STUDY: CPT

## 2018-12-27 PROCEDURE — 93970 EXTREMITY STUDY: CPT | Performed by: SURGERY

## 2018-12-27 NOTE — TELEPHONE ENCOUNTER
Spoke with patient on telephone today who is experiencing new onset pain behind his right knee which is worse with walking  Patient states that the area is painful to the touch but not hot or swollen as far as he can tell  Patient is s/p LAMINECTOMY LUMBAR LAMINECTOMY, DISCECTOMY, FORAMINOTOMY, L3-4, RIGHT (Right) on 12/17/18 by Dr Jennifer Maldonado  We sent patient for stat bilateral venous duplex to r/o DVT  He is scheduled this evening at 6pm at the [facility]  Informed Makayla Schulte since he is the PA that is on call this evening  Meera Montgomery also aware

## 2018-12-27 NOTE — TELEPHONE ENCOUNTER
Received a call from Vascular lab about Venous dupplex of LEs result  The study turns negative   Patient advised to call office for follow up

## 2019-01-04 ENCOUNTER — CLINICAL SUPPORT (OUTPATIENT)
Dept: NEUROSURGERY | Facility: CLINIC | Age: 59
End: 2019-01-04

## 2019-01-04 VITALS — TEMPERATURE: 98.3 F | SYSTOLIC BLOOD PRESSURE: 140 MMHG | DIASTOLIC BLOOD PRESSURE: 78 MMHG

## 2019-01-04 DIAGNOSIS — Z98.890 POST-OPERATIVE STATE: Primary | ICD-10-CM

## 2019-01-04 PROCEDURE — 99024 POSTOP FOLLOW-UP VISIT: CPT

## 2019-01-04 NOTE — PROGRESS NOTES
Post-Op Visit-Neurosurgery    Alfred Hensley 60 y o  male MRN: [id number]    Chief Complaint  Patient presents post: LAMINECTOMY LUMBAR LAMINECTOMY, DISCECTOMY, FORAMINOTOMY, L3-4, RIGHT (Right Spine Lumbar)    History of Present Illness  Patient presents for 2 week POV for incision check  Arrived accompanied by friend who drove him here  Reports pain is a 5/10 in his lower back without radiating down his legs  Reports mild numbness in his R shin area  Is wearing a R ankle/foot brace  Some L foot slapping noted with ambulation  Wants clearance to drive and return to work  Assessment  Wound Exam: Incisions is clean, dry, and in tact, well approximated, without, heat, swelling, or drainage  Mild redness noted at the surgical site  No obvious s/s of infection  See image below:        Procedure  Staple/suture removal    location: Lumbar spine region  Procedure Note: 1 running nylon suture removed  Patient Status:  the patient tolerated the procedure well  Complications: None  Incision CARO  Discussion/Summary  After suture removal cleaned incisions with NSS and gauze and covered with 4x4 to protect his clothing and for comfort  Advised that though we can not revoke his drivers license it is not recommended he drive for the first 6 weeks after surgery  He works as a  and so advised he should not return to work until further discussion with the surgeon at his 6 week post op visit  Reviewed incision care with patient including daily observation for s/s infection including: increased erythema, edema, drainage, dehiscence of incision or fever >101  Should these be observed, he understands that he is to call and/or return immediately for reassessment  Advised patient to continue cleansing area with mild soap and water and pat dry   Not to apply any lotions, creams, or ointments, & not to submerge in any water for two more weeks  He is to maintain activity restrictions until cleared by the surgeon  Activity levels were also reviewed with the patient in detail, he is to lift no greater than 10 pounds, advised to limit bend/twisting at the waist and ambulation is encouraged as tolerated  Verified date/time/location of upcoming POV on 2/7/2019   He is to call the office with any further questions or concerns, or if any incisional issues or fevers would arise

## 2019-01-07 ENCOUNTER — TELEPHONE (OUTPATIENT)
Dept: NEUROSURGERY | Facility: CLINIC | Age: 59
End: 2019-01-07

## 2019-01-07 NOTE — TELEPHONE ENCOUNTER
Pt reports he is experiencing some discomfort in his low back into his buttock  He started using heat and an icy hot patch  He admits to increasing his activity and this may be partly responsible  He was also reminded to change his posittion every 30 min when sitting  He questioned taking Ibuprofen and was instructed he may  He was encouraged to call back with any further concerns

## 2019-01-18 ENCOUNTER — HOSPITAL ENCOUNTER (OUTPATIENT)
Dept: RADIOLOGY | Age: 59
Discharge: HOME | End: 2019-01-18
Payer: COMMERCIAL

## 2019-01-18 DIAGNOSIS — M21.371 RIGHT FOOT DROP: ICD-10-CM

## 2019-01-18 PROCEDURE — 73721 MRI JNT OF LWR EXTRE W/O DYE: CPT

## 2019-01-21 ENCOUNTER — TELEPHONE (OUTPATIENT)
Dept: FAMILY MEDICINE CLINIC | Facility: CLINIC | Age: 59
End: 2019-01-21

## 2019-01-21 ENCOUNTER — TELEPHONE (OUTPATIENT)
Dept: NEUROSURGERY | Facility: CLINIC | Age: 59
End: 2019-01-21

## 2019-01-21 NOTE — TELEPHONE ENCOUNTER
Pt called following up on MRI results  Pt states he already had surgery for the foot drop  Pt will like to know what is the next plan of care for his right gluteal strain with DJD  Please advise

## 2019-01-21 NOTE — TELEPHONE ENCOUNTER
Pt reports a call from his PCP re a MRI ordered by PCP on 12/11 a day prior to his ED visit for R foot drop and then surgery  He questions any f/u for the gluteal muscle strain noted on study  Suggested this be managed by his PCP as we don't usually manage strains, and they ordered the MRI Hip  He stated an understanding

## 2019-01-21 NOTE — TELEPHONE ENCOUNTER
Spoke to pt and notified of MRI showed Right gluteal strain with DJD as per Enoch Rainey, Pt already seeing Neruro surgery for foot drop   Roxy BULLARD

## 2019-01-21 NOTE — TELEPHONE ENCOUNTER
----- Message from [address] [place] [address] sent at 1/20/2019  8:24 PM EST -----  MRI showed Right gluteal strain with DJD

## 2019-01-21 NOTE — TELEPHONE ENCOUNTER
Pt advised with recommendation  Pt states he had surgery he will follow up with his surgeon, he has not received clearance for pt

## 2019-02-07 ENCOUNTER — OFFICE VISIT (OUTPATIENT)
Dept: NEUROSURGERY | Facility: CLINIC | Age: 59
End: 2019-02-07

## 2019-02-07 VITALS
SYSTOLIC BLOOD PRESSURE: 146 MMHG | RESPIRATION RATE: 16 BRPM | BODY MASS INDEX: 22.13 KG/M2 | WEIGHT: 178 LBS | TEMPERATURE: 98 F | HEART RATE: 75 BPM | DIASTOLIC BLOOD PRESSURE: 75 MMHG | HEIGHT: 75 IN

## 2019-02-07 DIAGNOSIS — Z48.89 AFTERCARE FOLLOWING SURGERY: ICD-10-CM

## 2019-02-07 DIAGNOSIS — M21.371 BILATERAL FOOT-DROP: Primary | ICD-10-CM

## 2019-02-07 DIAGNOSIS — M21.372 BILATERAL FOOT-DROP: Primary | ICD-10-CM

## 2019-02-07 PROCEDURE — 99024 POSTOP FOLLOW-UP VISIT: CPT | Performed by: NEUROLOGICAL SURGERY

## 2019-02-07 NOTE — PROGRESS NOTES
Assessment/Plan:    No problem-specific Assessment & Plan notes found for this encounter  Improved back pain and no leg pain still has foot drop on the right  Reassurance but did state apprehension that the foot drop will likely not get better  Will order emg to r/o peripheral neuropathy  Diagnoses and all orders for this visit:    Bilateral foot-drop  -     Ambulatory referral to Physical Therapy; Future  -     EMG 2 limb lower extremity; Future    Aftercare following surgery          Subjective:      Patient ID: Jasmin Collins is a 60 y o  male 6 weeks follow up doing well pain wise  Still has a foot drop but no radiating pain  HPI    The following portions of the patient's history were reviewed and updated as appropriate: allergies, current medications, past family history, past medical history, past social history, past surgical history and problem list     Review of Systems   Constitutional: Negative  HENT: Negative  Eyes: Negative  Respiratory: Negative  Cardiovascular: Negative  Gastrointestinal: Negative  Endocrine: Negative  Genitourinary: Negative  Musculoskeletal: Positive for back pain (improving)  Skin: Negative  Allergic/Immunologic: Negative  Neurological: Negative  Hematological: Negative  Psychiatric/Behavioral: Negative  Objective:      /75 (BP Location: Right arm, Patient Position: Sitting)   Pulse 75   Temp 98 °F (36 7 °C) (Tympanic)   Resp 16   Ht 6' 3" (1 905 m)   Wt 80 7 kg (178 lb)   BMI 22 25 kg/m²          Physical Exam   Constitutional: He is oriented to person, place, and time  He appears well-developed  HENT:   Head: Normocephalic and atraumatic  Eyes: Pupils are equal, round, and reactive to light  EOM are normal    Neurological: He is alert and oriented to person, place, and time

## 2019-02-08 ENCOUNTER — EVALUATION (OUTPATIENT)
Dept: PHYSICAL THERAPY | Facility: CLINIC | Age: 59
End: 2019-02-08
Payer: COMMERCIAL

## 2019-02-08 DIAGNOSIS — M21.372 BILATERAL FOOT-DROP: ICD-10-CM

## 2019-02-08 DIAGNOSIS — M21.371 BILATERAL FOOT-DROP: ICD-10-CM

## 2019-02-08 PROCEDURE — 97110 THERAPEUTIC EXERCISES: CPT | Performed by: PHYSICAL THERAPIST

## 2019-02-08 PROCEDURE — 97162 PT EVAL MOD COMPLEX 30 MIN: CPT

## 2019-02-08 NOTE — PROGRESS NOTES
PT Evaluation     Today's date: 2019  Patient name: Jasmin Collins  : 1960  MRN: [id number]  Referring provider: Candelario King MD  Dx:   Encounter Diagnosis     ICD-10-CM    1  Bilateral foot-drop M21 371 Ambulatory referral to Physical Therapy    M21 372        Start Time: 633  Stop Time: 1600  Total time in clinic (min): 45 minutes    Assessment  Assessment details: Patient presents with bilateral foot drop with impairments of decreased (B) DF ROM/strength and limited (B) hip strength following a L4/L5 laminectomy surgery in December  Recommended patient perform HEP for PF stretching and ankle/core/hip strengthening  Patient will benefit from skilled physical therapy to improve DF activation through exercise  Patient would also benefit from NMES to reduce muscle atrophy  Will also benefit from structured home exercise program to increase core and hip strength to improve overall physical function  Understanding of Dx/Px/POC: good   Prognosis: fair    Goals  1  Patient will independent with HEP to increase ROM and strength of (B) hips and ankles in 2 weeks  2  Patient will be independent with use of TENS unit to increase activation of dorsiflexors in 2 weeks  Plan  Plan details: TENs and review/progress HEP   Planned therapy interventions: manual therapy, massage, balance, balance/weight bearing training, flexibility, functional ROM exercises, therapeutic activities, therapeutic exercise, stretching, strengthening, patient education, orthotic management and training, neuromuscular re-education, therapeutic training, home exercise program, IADL retraining and body mechanics training  Frequency: 2x week  Duration in weeks: 2  Plan of Care beginning date: 2019  Plan of Care expiration date: 2019  Treatment plan discussed with: patient        Subjective Evaluation    History of Present Illness  Mechanism of injury: 61 y/o male presents to PT for bilateral foot drop       Recent laminectomy/discectomy 6 weeks ago  Left foot drop since 2002 and right foot dropped a few months ago so he had surgery as soon as possible to fix it  He has bilateral foot drop  He does not wear a brace on the left foot, only the right  He only wears it when he is driving  He saw the surgeon yesterday and he said everything looks pretty good  He is not on any precautions from the surgery  Current symptoms: right foot drop, no pain or symptoms      Functional limitations: able to walk around, go up and down the stairs as soon as he left the hospital    Has not seen PT before for this  EMG study ordered but he can not get it until April     Pain  No pain reported    Social Support  Stairs in house: yes   Lives in: multiple-level home  Lives with: spouse    Employment status: working  Exercise history: just got cleared yesterday, lifting       Diagnostic Tests  MRI studies: normal  Treatments  No previous or current treatments  Patient Goals  Patient goals for therapy: increased strength  Patient goal: get rid of the foot drop        Objective     Active Range of Motion   Left Ankle/Foot   Dorsiflexion (kf): -48 degrees     Right Ankle/Foot   Dorsiflexion (kf): -50 degrees     Passive Range of Motion   Left Ankle/Foot    Dorsiflexion (kf): -15 degrees     Right Ankle/Foot    Dorsiflexion (kf): 0 degrees      Strength/Myotome Testing     Left Hip   Planes of Motion   Flexion: 5  Extension: 4  Abduction: 3+    Right Hip   Planes of Motion   Flexion: 5  Extension: 4  Abduction: 3+    Left Knee   Flexion: 5  Extension: 5    Right Knee   Flexion: 5  Extension: 5    Left Ankle/Foot   Dorsiflexion: 1  Plantar flexion: 5    Right Ankle/Foot   Dorsiflexion: 1  Plantar flexion: 5  Neuro Exam:     Functional outcomes   Functional outcome gait comment: No heel strike, significant toe walking bilaterally  Vaulting on (L) side   Increased bilateral knee flexion         Precautions: hypertension    Specialty Daily Treatment Diary     Manual                                                     Exercise Diary  2/8/19       Soleus strech 3 x 30 sec       Gastroc strech 3 x 30 sec                Bridges 10 x (HEP)        Supine TrA with jose miguel  10 x each (HEP)                                                                                                                                   Modalities

## 2019-03-07 ENCOUNTER — HOSPITAL ENCOUNTER (OUTPATIENT)
Dept: RADIOLOGY | Facility: HOSPITAL | Age: 59
Discharge: HOME | End: 2019-03-07
Payer: COMMERCIAL

## 2019-03-07 ENCOUNTER — TRANSCRIBE ORDERS (OUTPATIENT)
Dept: ADMINISTRATIVE | Facility: HOSPITAL | Age: 59
End: 2019-03-07

## 2019-03-07 DIAGNOSIS — R05.9 COUGH: Primary | ICD-10-CM

## 2019-03-07 DIAGNOSIS — R05.9 COUGH: ICD-10-CM

## 2019-03-07 PROCEDURE — 71046 X-RAY EXAM CHEST 2 VIEWS: CPT

## 2019-04-16 ENCOUNTER — TELEPHONE (OUTPATIENT)
Dept: NEUROSURGERY | Facility: CLINIC | Age: 59
End: 2019-04-16

## 2019-04-17 ENCOUNTER — HOSPITAL ENCOUNTER (OUTPATIENT)
Dept: NEUROLOGY | Facility: AMBULATORY SURGERY CENTER | Age: 59
Discharge: HOME | End: 2019-04-17
Payer: COMMERCIAL

## 2019-04-17 DIAGNOSIS — M21.371 BILATERAL FOOT-DROP: ICD-10-CM

## 2019-04-17 DIAGNOSIS — M21.372 BILATERAL FOOT-DROP: ICD-10-CM

## 2019-04-17 PROCEDURE — 95911 NRV CNDJ TEST 9-10 STUDIES: CPT | Performed by: PSYCHIATRY & NEUROLOGY

## 2019-05-02 ENCOUNTER — TRANSCRIBE ORDERS (OUTPATIENT)
Dept: NEUROSURGERY | Facility: CLINIC | Age: 59
End: 2019-05-02

## 2019-05-02 ENCOUNTER — OFFICE VISIT (OUTPATIENT)
Dept: NEUROSURGERY | Facility: CLINIC | Age: 59
End: 2019-05-02
Payer: COMMERCIAL

## 2019-05-02 VITALS
HEART RATE: 66 BPM | DIASTOLIC BLOOD PRESSURE: 87 MMHG | TEMPERATURE: 97.7 F | SYSTOLIC BLOOD PRESSURE: 162 MMHG | WEIGHT: 179 LBS | BODY MASS INDEX: 22.26 KG/M2 | HEIGHT: 75 IN | RESPIRATION RATE: 16 BRPM

## 2019-05-02 DIAGNOSIS — Z01.818 PRE-PROCEDURAL EXAMINATION: Primary | ICD-10-CM

## 2019-05-02 DIAGNOSIS — M51.26 HNP (HERNIATED NUCLEUS PULPOSUS), LUMBAR: ICD-10-CM

## 2019-05-02 DIAGNOSIS — M21.371 FOOT DROP, RIGHT: Primary | ICD-10-CM

## 2019-05-02 PROCEDURE — 99213 OFFICE O/P EST LOW 20 MIN: CPT | Performed by: NEUROLOGICAL SURGERY

## 2019-05-02 RX ORDER — DOXYCYCLINE HYCLATE 100 MG/1
100 CAPSULE ORAL 2 TIMES DAILY
Refills: 0 | COMMUNITY
Start: 2019-04-22 | End: 2019-07-23 | Stop reason: ALTCHOICE

## 2019-05-08 ENCOUNTER — APPOINTMENT (OUTPATIENT)
Dept: LAB | Facility: HOSPITAL | Age: 59
End: 2019-05-08
Payer: COMMERCIAL

## 2019-05-08 ENCOUNTER — TRANSCRIBE ORDERS (OUTPATIENT)
Dept: ADMINISTRATIVE | Facility: HOSPITAL | Age: 59
End: 2019-05-08

## 2019-05-08 DIAGNOSIS — R97.20 ELEVATED PROSTATE SPECIFIC ANTIGEN (PSA): ICD-10-CM

## 2019-05-08 DIAGNOSIS — R97.20 ELEVATED PROSTATE SPECIFIC ANTIGEN (PSA): Primary | ICD-10-CM

## 2019-05-08 LAB — PSA SERPL-MCNC: 1.3 NG/ML (ref 0–4)

## 2019-05-08 PROCEDURE — G0103 PSA SCREENING: HCPCS

## 2019-05-08 PROCEDURE — 36415 COLL VENOUS BLD VENIPUNCTURE: CPT

## 2019-05-17 ENCOUNTER — TELEPHONE (OUTPATIENT)
Dept: NEUROSURGERY | Facility: CLINIC | Age: 59
End: 2019-05-17

## 2019-05-17 ENCOUNTER — APPOINTMENT (OUTPATIENT)
Dept: RADIOLOGY | Facility: MEDICAL CENTER | Age: 59
End: 2019-05-17
Payer: COMMERCIAL

## 2019-05-17 DIAGNOSIS — Z48.89 AFTERCARE FOLLOWING SURGERY: ICD-10-CM

## 2019-05-17 DIAGNOSIS — Z48.89 AFTERCARE FOLLOWING SURGERY: Primary | ICD-10-CM

## 2019-05-17 PROCEDURE — 72110 X-RAY EXAM L-2 SPINE 4/>VWS: CPT

## 2019-05-29 ENCOUNTER — TELEPHONE (OUTPATIENT)
Dept: NEUROSURGERY | Facility: CLINIC | Age: 59
End: 2019-05-29

## 2019-07-23 ENCOUNTER — OFFICE VISIT (OUTPATIENT)
Dept: FAMILY MEDICINE CLINIC | Facility: CLINIC | Age: 59
End: 2019-07-23
Payer: COMMERCIAL

## 2019-07-23 VITALS
HEART RATE: 66 BPM | SYSTOLIC BLOOD PRESSURE: 144 MMHG | RESPIRATION RATE: 16 BRPM | WEIGHT: 189.2 LBS | TEMPERATURE: 97.8 F | BODY MASS INDEX: 23.52 KG/M2 | DIASTOLIC BLOOD PRESSURE: 98 MMHG | HEIGHT: 75 IN

## 2019-07-23 DIAGNOSIS — I10 ESSENTIAL HYPERTENSION: Primary | ICD-10-CM

## 2019-07-23 DIAGNOSIS — H61.23 BILATERAL IMPACTED CERUMEN: ICD-10-CM

## 2019-07-23 PROCEDURE — 3008F BODY MASS INDEX DOCD: CPT | Performed by: FAMILY MEDICINE

## 2019-07-23 PROCEDURE — 99213 OFFICE O/P EST LOW 20 MIN: CPT | Performed by: FAMILY MEDICINE

## 2019-07-23 PROCEDURE — 69209 REMOVE IMPACTED EAR WAX UNI: CPT | Performed by: FAMILY MEDICINE

## 2019-07-23 NOTE — PATIENT INSTRUCTIONS
Liz Doherty was seen today for ear fullness  Diagnoses and all orders for this visit:    Essential hypertension  Comments:  - Cont current doseof Amlodipine  - Cut down salt intake  - Increase activity as tolerated  - Maintain BP log and bring the log in office in each visit     Bilateral impacted cerumen  Comments:  Cleaned today  Orders:  -     Ear cerumen removal    Other orders  -     Cancel: Foreign body removal    Liz Doherty was seen today for ear fullness  Diagnoses and all orders for this visit:    Essential hypertension  Comments:  - Cont current doseof Amlodipine  - Cut down salt intake  - Increase activity as tolerated  - Maintain BP log and bring the log in office in each visit     Bilateral impacted cerumen  Comments:  Cleaned today  Orders:  -     Ear cerumen removal    Other orders  -     Cancel: Foreign body removal          DASH Eating Plan   WHAT YOU NEED TO KNOW:   What is the DASH Eating Plan? The DASH (Dietary Approaches to Stop Hypertension) Eating Plan is designed to help prevent or lower high blood pressure  It can also help to lower LDL (bad) cholesterol and decrease your risk for heart disease  The plan is low in sodium, sugar, unhealthy fats, and total fat  It is high in potassium, calcium, magnesium, and fiber  These nutrients are added when you eat more fruits, vegetables, and whole grains  What is my sodium limit for each day? Your dietitian will tell you how much sodium is safe for you to have each day  People with high blood pressure should have no more than 1,500 to 2,300 mg of sodium in a day  A teaspoon (tsp) of salt has 2,300 mg of sodium  This may seem like a difficult goal, but small changes to the foods you eat can make a big difference  Your healthcare provider or dietitian can help you create a meal plan that follows your sodium limit  How do I limit sodium? · Read food labels  Food labels can help you choose foods that are low in sodium   The amount of sodium is listed in milligrams (mg)  The % Daily Value (DV) column tells you how much of your daily needs are met by 1 serving of the food for each nutrient listed  Choose foods that have less than 5% of the DV of sodium  These foods are considered low in sodium  Foods that have 20% or more of the DV of sodium are considered high in sodium  Avoid foods that have more than 300 mg of sodium in each serving  Choose foods that say low-sodium, reduced-sodium, or no salt added on the food label  · Avoid salt  Do not salt food at the table, and add very little salt to foods during cooking  Use herbs and spices, such as onions, garlic, and salt-free seasonings to add flavor to foods  Try lemon or lime juice or vinegar to give foods a tart flavor  Use hot peppers or a small amount of hot pepper sauce to add a spicy flavor to foods  · Ask about salt substitutes  Ask your healthcare provider if you may use salt substitutes  Some salt substitutes have ingredients that can be harmful if you have certain health conditions  · Choose foods carefully at restaurants  Meals from restaurants, especially fast food restaurants, are often high in sodium  Some restaurants have nutrition information that tells you the amount of sodium in their foods  Ask to have your food prepared with less, or no salt  What should I know about fats? · Include healthy fats  Examples are unsaturated fats and omega-3 fatty acids  Unsaturated fats are found in soybean, canola, olive, or sunflower oil, and liquid and soft tub margarines  Omega-3 fatty acids are found in fatty fish, such as salmon, tuna, mackerel, and sardines  It is also found in flaxseed oil and ground flaxseed  · Avoid unhealthy fats  Do not eat unhealthy fats, such as saturated fats and trans fats  Saturated fats are found in foods that contain fat from animals  Examples are fatty meats, whole milk, butter, cream, and other dairy foods   It is also found in shortening, stick margarine, palm oil, and coconut oil  Trans fats are found in fried foods, crackers, chips, and baked goods made with margarine or shortening  Which foods should I include? With the DASH eating plan, you need to eat a certain number of servings from each food group  This will help you get enough of certain nutrients and limit others  The amount of servings you should eat depends on how many calories you need  Your dietitian can tell you how many calories you need  The number of servings listed next to the food groups below are for people who need about 2,000 calories each day    · Grains:  6 to 8 servings (3 of these servings should be whole-grain foods)    ¨ 1 slice of whole-grain bread     ¨ 1 ounce of dry cereal    ¨ ½ cup of cooked cereal, pasta, or brown rice    · Vegetables and fruits:  4 to 5 servings of fruits and 4 to 5 servings of vegetables    ¨ 1 medium fruit    ¨ 1 cup of raw leafy vegetable    ¨ ½ cup of frozen, canned (no added salt), or chopped fresh vegetables     ¨ ½ cup of fresh, frozen, dried, or canned fruit (canned in light syrup or fruit juice)    ¨ ½ cup of vegetable or fruit juice    · Dairy:  2 to 3 servings    ¨ 1 cup of nonfat (skim) or 1% milk    ¨ 1½ ounces of fat-free or low-fat, low-sodium cheese    ¨ 6 ounces of nonfat or low-fat yogurt    · Lean meat, poultry, and fish:  6 ounces or less    [employer] (chicken, turkey) with no skin    ¨ Fish (especially fatty fish, such as salmon, fresh tuna, or mackerel)    ¨ Lean beef and pork (loin, round, extra lean hamburger)    ¨ Egg whites and egg substitutes    · Nuts, seeds, and legumes:  4 to 5 servings each week    ¨ ½ cup of cooked beans and peas    ¨ 1½ ounces of unsalted nuts    ¨ 2 tablespoons of peanut butter or seeds    · Sweets and added sugars:  5 or less each week    ¨ 1 tablespoon of sugar, jelly, or jam    ¨ ½ cup of sorbet or gelatin    ¨ 1 cup of lemonade    · Fats:  2 to 3 servings each week    ¨ 1 teaspoon of soft margarine or vegetable oil    ¨ 1 tablespoon of mayonnaise    ¨ 2 tablespoons of salad dressing  Which foods should I avoid?    · Grains:      [employer], such as doughnuts, pastries, cookies, and biscuits (high in fat and sugar)    ¨ Mixes for cornbread and biscuits, packaged foods, such as bread stuffing, rice and pasta mixes, macaroni and cheese, and instant cereals (high in sodium)    · Fruits and vegetables:      ¨ Regular, canned vegetables (high in sodium)    ¨ Sauerkraut, pickled vegetables, and other foods prepared in brine (high in sodium)    ¨ Fried vegetables or vegetables in butter or high-fat sauces    ¨ Fruit in cream or butter sauce (high in fat)    · Dairy:      ¨ Whole milk, 2% milk, and cream (high in fat)    ¨ Regular cheese and processed cheese (high in fat and sodium)    · Meats and protein foods:      ¨ Smoked or cured meat, such as corned beef, andino, ham, hot dogs, and sausage (high in fat and sodium)    ¨ Canned beans and canned meats or spreads, such as potted meats, sardines, anchovies, and imitation seafood (high in sodium)    ¨ Deli or lunch meats, such as bologna, ham, turkey, and roast beef (high in sodium)    ¨ High-fat meat (T-bone steak, regular hamburger, and ribs)    ¨ Whole eggs and egg yolks (high in fat)    · Other:      ¨ Seasonings made with salt, such as garlic salt, celery salt, onion salt, seasoned salt, meat tenderizers, and monosodium glutamate (MSG)    ¨ Miso soup and canned or dried soup mixes (high in sodium)    ¨ Regular soy sauce, barbecue sauce, teriyaki sauce, steak sauce, Worcestershire sauce, and most flavored vinegars (high in sodium)    ¨ Regular condiments, such as mustard, ketchup, and salad dressings (high in sodium)    ¨ Gravy and sauces, such as Huy or cheese sauces (high in sodium and fat)    ¨ Drinks high in sugar, such as soda or fruit drinks    [employer] foods, such as salted chips, popcorn, pretzels, pork rinds, salted crackers, and salted nuts    ¨ Frozen foods, such as dinners, entrees, vegetables with sauces, and breaded meats (high in sodium)  What other guidelines should I follow? · Maintain a healthy weight  Your risk for heart disease is higher if you are overweight  Your healthcare provider may suggest that you lose weight if you are overweight  You can lose weight by eating fewer calories and foods that have added sugars and fat  The DASH meal plan can help you do this  Decrease calories by eating smaller portions at each meal and fewer snacks  Ask your healthcare provider for more information about how to lose weight  · Exercise regularly  Regular exercise can help you reach or maintain a healthy weight  Regular exercise can also help decrease your blood pressure and improve your cholesterol levels  Get 30 minutes or more of moderate exercise each day of the week  To lose weight, get at least 60 minutes of exercise  Talk to your healthcare provider about the best exercise program for you  · Limit alcohol  Women should limit alcohol to 1 drink a day  Men should limit alcohol to 2 drinks a day  A drink of alcohol is 12 ounces of beer, 5 ounces of wine, or 1½ ounces of liquor  Where can I find more information? · [facility] and [address]  [address] [address]  [place] [address]  Phone: [phone]  Web Address: [facility] no  CARE AGREEMENT:   You have the right to help plan your care  Discuss treatment options with your caregivers to decide what care you want to receive  You always have the right to refuse treatment  The above information is an  only  It is not intended as medical advice for individual conditions or treatments  Talk to your doctor, nurse or pharmacist before following any medical regimen to see if it is safe and effective for you    © 2017 [employer] Information is for End User's use only and may not be sold, redistributed or otherwise used for commercial purposes  All illustrations and images included in CareNotes® are the copyrighted property of A D A M , Inc  or Mahesh Madison

## 2019-07-23 NOTE — PROGRESS NOTES
Assessment/Plan:         Diagnoses and all orders for this visit:    Essential hypertension  Comments:  - Cont current doseof Amlodipine  - Cut down salt intake  - Increase activity as tolerated  - Maintain BP log and bring the log in office in each visit     Bilateral impacted cerumen  Comments:  Cleaned today  Orders:  -     Ear cerumen removal    Other orders  -     Cancel: Foreign body removal          Subjective:      Patient ID: Bradley Bustamante is a 60 y o  male  Pt  Armin Falk to urgent care for sinus symptoms and found to have impacted cerumen  Ear lavage was not successful  Pt  Was discharged with debrox ear drop  Pt is also concerned about his BP that is running high  The following portions of the patient's history were reviewed and updated as appropriate: allergies, past family history, past social history, past surgical history and problem list     He  has a past medical history of Dry skin dermatitis, Dyspepsia, and Hypertension  Current Outpatient Medications   Medication Sig Dispense Refill    amLODIPine (NORVASC) 2 5 mg tablet Take 1 tablet (2 5 mg total) by mouth daily 30 tablet 5    tamsulosin (FLOMAX) 0 4 mg Take 1 capsule by mouth daily      valACYclovir (VALTREX) 500 mg tablet Take 500 mg by mouth 2 (two) times a day  3     No current facility-administered medications for this visit  Review of Systems   Constitutional: Negative for fatigue and unexpected weight change  HENT: Positive for hearing loss, sinus pressure and sneezing  Negative for ear pain and sore throat  Eyes: Negative for visual disturbance  Respiratory: Negative for cough and shortness of breath  Cardiovascular: Negative for chest pain and palpitations  Neurological: Positive for headaches  Negative for dizziness     Psychiatric/Behavioral:        Anxiety         Objective:      /98 (BP Location: Right arm, Patient Position: Sitting, Cuff Size: Adult)   Pulse 66   Temp 97 8 °F (36 6 °C) (Temporal) Resp 16   Ht 6' 3" (1 905 m)   Wt 85 8 kg (189 lb 3 2 oz)   BMI 23 65 kg/m²          Physical Exam   Constitutional: He is oriented to person, place, and time  He appears well-developed and well-nourished  HENT:   Head: Normocephalic and atraumatic  Right Ear: Tympanic membrane normal    Eyes: Pupils are equal, round, and reactive to light  Conjunctivae and EOM are normal    Cardiovascular: Normal rate, regular rhythm, normal heart sounds and intact distal pulses  Pulmonary/Chest: Effort normal and breath sounds normal    Musculoskeletal: He exhibits no edema  Neurological: He is alert and oriented to person, place, and time  Psychiatric:   Anxious looking     Ear cerumen removal  Date/Time: 7/23/2019 2:30 PM  Performed by: Yung Cortez MD  Authorized by: Yung Cortez MD     Patient location:  Clinic  Other Assisting Provider: No    Consent:     Consent obtained:  Verbal    Consent given by:  Patient    Risks discussed:  TM perforation, pain and incomplete removal  Universal protocol:     Procedure explained and questions answered to patient or proxy's satisfaction: yes      Relevant documents present and verified: no      Test results available and properly labeled: no      Radiology Images displayed and confirmed  If images not available, report reviewed: no      Required blood products, implants, devices and special equipment available: no      Site/side marked: no      Immediately prior to procedure a time out was called: no    Procedure details:     Location:  L ear and R ear    Procedure type: irrigation only    Post-procedure details:     Complication:  None    Hearing quality:  Normal    Patient tolerance of procedure:   Tolerated well, no immediate complications

## 2019-09-16 ENCOUNTER — OFFICE VISIT (OUTPATIENT)
Dept: FAMILY MEDICINE CLINIC | Facility: CLINIC | Age: 59
End: 2019-09-16
Payer: COMMERCIAL

## 2019-09-16 VITALS
DIASTOLIC BLOOD PRESSURE: 82 MMHG | SYSTOLIC BLOOD PRESSURE: 146 MMHG | HEART RATE: 68 BPM | WEIGHT: 188.8 LBS | HEIGHT: 75 IN | BODY MASS INDEX: 23.48 KG/M2

## 2019-09-16 DIAGNOSIS — M21.372 LEFT FOOT DROP: ICD-10-CM

## 2019-09-16 DIAGNOSIS — M21.371 RIGHT FOOT DROP: ICD-10-CM

## 2019-09-16 DIAGNOSIS — F51.01 PRIMARY INSOMNIA: ICD-10-CM

## 2019-09-16 DIAGNOSIS — S23.3XXA SPRAIN OF UPPER BACK, INITIAL ENCOUNTER: ICD-10-CM

## 2019-09-16 DIAGNOSIS — F41.9 ANXIETY: Primary | ICD-10-CM

## 2019-09-16 PROCEDURE — 99214 OFFICE O/P EST MOD 30 MIN: CPT | Performed by: PHYSICIAN ASSISTANT

## 2019-09-16 RX ORDER — METHOCARBAMOL 750 MG/1
750 TABLET, FILM COATED ORAL EVERY 6 HOURS PRN
Qty: 30 TABLET | Refills: 1 | Status: SHIPPED | OUTPATIENT
Start: 2019-09-16 | End: 2019-11-01 | Stop reason: SDUPTHER

## 2019-09-16 RX ORDER — TRAZODONE HYDROCHLORIDE 50 MG/1
50 TABLET ORAL
Qty: 30 TABLET | Refills: 1 | Status: ON HOLD | OUTPATIENT
Start: 2019-09-16 | End: 2019-10-08 | Stop reason: SDUPTHER

## 2019-09-16 NOTE — ASSESSMENT & PLAN NOTE
Trial of trazodone 50 mg at bedtime  Pt  is insisting that I prescribe him 0 5 mg of clonazepam for bedtime as it has worked for him in the past  I am adamant that we try other alternative before using a controlled substance first  PMED was quiered and appropriate

## 2019-09-16 NOTE — PATIENT INSTRUCTIONS
Problem List Items Addressed This Visit        Musculoskeletal and Integument    Sprain of upper back     Trial robaxin as needed  Relevant Medications    methocarbamol (ROBAXIN) 750 mg tablet       Other    Right foot drop    Relevant Orders    Brace    Anxiety - Primary    Primary insomnia     Trial of trazodone 50 mg at bedtime  Pt  is insisting that I prescribe him 0 5 mg of clonazepam for bedtime as it has worked for him in the past  I am adamant that we try other alternative before using a controlled substance first  PMED was quiered and appropriate  Relevant Medications    traZODone (DESYREL) 50 mg tablet    Left foot drop     Pt  needs a new brace for his L foot as he also has a new R foot drop and no longer can go without both being braced for ambulation  Order placed for a generic brace to see if insurance will cover at a [employer] store

## 2019-09-16 NOTE — ASSESSMENT & PLAN NOTE
Pt  needs a new brace for his L foot as he also has a new R foot drop and no longer can go without both being braced for ambulation  Order placed for a generic brace to see if insurance will cover at a [facility]  [employer] store

## 2019-09-16 NOTE — PROGRESS NOTES
Assessment and Plan:    Problem List Items Addressed This Visit        Musculoskeletal and Integument    Sprain of upper back     Trial robaxin as needed  Relevant Medications    methocarbamol (ROBAXIN) 750 mg tablet       Other    Right foot drop    Relevant Orders    Brace    Anxiety - Primary    Primary insomnia     Trial of trazodone 50 mg at bedtime  Pt  is insisting that I prescribe him 0 5 mg of clonazepam for bedtime as it has worked for him in the past  I am adamant that we try other alternative before using a controlled substance first  PMED was quiered and appropriate  Relevant Medications    traZODone (DESYREL) 50 mg tablet    Left foot drop     Pt  needs a new brace for his L foot as he also has a new R foot drop and no longer can go without both being braced for ambulation  Order placed for a generic brace to see if insurance will cover at a [employer]  supply store  Diagnoses and all orders for this visit:    Anxiety    Primary insomnia  -     traZODone (DESYREL) 50 mg tablet; Take 1 tablet (50 mg total) by mouth daily at bedtime    Right foot drop  -     Brace    Left foot drop    Sprain of upper back, initial encounter  -     methocarbamol (ROBAXIN) 750 mg tablet; Take 1 tablet (750 mg total) by mouth every 6 (six) hours as needed for muscle spasms for up to 14 days              Subjective:      Patient ID: Daniel Gambino is a 60 y o  male  CC:    Chief Complaint   Patient presents with    Back Pain     Pt states he has right sided back pain  He is S/P surgery on the back in 12/2018   Anxiety     Pt states he has occasional anxiety and trouble sleeping  He states his girlfriend is on Clonazepam and wonders if he could get a RX for it  HPI:    Pt here today for a couple different reasons  He has had L foot drop after spine surgery for many years ago but recently in December had spine surgery again and has a new right foot drop where he needs a brace  Now that patient has issues of both feet he would like a new brace for his left  He has not used 1 in many years  He is dealing with a lot of anxiety and trouble sleeping and relaxing at bedtime and would like to get a Rx for clonazepam as he tried one of his girlfriends and it worked well  He has tried Benadryl and NyQuil and gives him a hangover  He knows this works and this is what he wants per patient  He feels he has the right to ask for what he knows works for him  He is status post 2 back surgeries resulting in bilateral footdrop and states his back is very tense and spasming  He does have a 10s unit that he uses on occasion  He states that he knows that the clonazepam relaxes both his muscles and his mind allowing him to sleep better  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 and 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  He states he took 2 Robaxin yesterday and helped him a little bit was wondering if he could have Flexeril for when he needs it for his back and having spasming  The following portions of the patient's history were reviewed and updated as appropriate: allergies, current medications, past family history, past medical history, past social history, past surgical history and problem list       Review of Systems   Constitutional: Negative  HENT: Negative  Eyes: Negative  Respiratory: Negative  Cardiovascular: Negative  Gastrointestinal: Negative  Endocrine: Negative  Genitourinary: Negative  Musculoskeletal: Positive for back pain and gait problem  Skin: Negative  Allergic/Immunologic: Negative  Neurological: Positive for weakness  Hematological: Negative  Psychiatric/Behavioral: Positive for sleep disturbance  The patient is nervous/anxious            Data to review:       Objective:    Vitals:    09/16/19 1408   BP: 146/82   BP Location: Left arm   Patient Position: Sitting   Pulse: 68   Weight: 85 6 kg (188 lb 12 8 oz)   Height: 6' 3" (1 905 m)        Physical Exam   Constitutional: He is oriented to person, place, and time  He appears well-developed and well-nourished  No distress  HENT:   Head: Normocephalic and atraumatic  Eyes: Conjunctivae are normal  Right eye exhibits no discharge  Left eye exhibits no discharge  Neck: Carotid bruit is not present  Cardiovascular: Normal rate, regular rhythm and normal heart sounds  Exam reveals no gallop and no friction rub  No murmur heard  Pulmonary/Chest: Effort normal and breath sounds normal  No respiratory distress  He has no wheezes  He has no rales  Musculoskeletal:   Pt  does have tender muscle tightness to his thoracic paravertebral spine above his lumbar spine surgery scar  Bilateral foot drop right and a posterior splint/ brace  Neurological: He is alert and oriented to person, place, and time  Skin: Skin is warm and dry  He is not diaphoretic  Psychiatric: He has a normal mood and affect  Judgment normal    Nursing note and vitals reviewed

## 2019-09-25 ENCOUNTER — TELEPHONE (OUTPATIENT)
Dept: NEUROSURGERY | Facility: CLINIC | Age: 59
End: 2019-09-25

## 2019-09-25 NOTE — TELEPHONE ENCOUNTER
Id recommend PT as long as he has no new weakness for 4 weeks if pain persists we can get a repeat MRI

## 2019-09-25 NOTE — TELEPHONE ENCOUNTER
Patient called because he said he re-injured his back, he said he did some lifting, and moving about 3 weeks ago  He said his pain is on the right side LB and does not radiate down the legs  He said that he continues with the b/l foot drop  He said that has not improved since his previous surgery  Back in May he had some increased pain but that was resolved,     Pt said he was taking methocarbamol without relief and now he is taking baclofen and tramadol and he said it helps a little  Patient wants to know if you would recommend a new MRI to check if he re-injured his back? Or office visit?

## 2019-09-25 NOTE — TELEPHONE ENCOUNTER
Patient informed he should refer to PCP for Physical Therapy  He said his insurance does no cover PT and it is about a  50 dollar co-pay  He said he is going to have to think about it to figure out what he does

## 2019-09-30 ENCOUNTER — TRANSCRIBE ORDERS (OUTPATIENT)
Dept: ADMINISTRATIVE | Facility: HOSPITAL | Age: 59
End: 2019-09-30

## 2019-10-02 NOTE — TELEPHONE ENCOUNTER
Patient contacted office again regarding b/l foot drop getting worse  He said that the numbness is going up to his knee - he has not reached out to PCP for Physical therapy, he feels it is getting worse  He said he is having trouble going up and down stairs or anytime he puts pressure  He would really like to know if something is going on with his back

## 2019-10-03 NOTE — TELEPHONE ENCOUNTER
Telephoned patient as in f/u and after discussion w/ Dr Vanessa Heller  If patient has new complaints schedule office visit     He reports  deterioration in condition since appointment with Dr Vanessa Heller in May and after lifting heavy furniture  Initially  independently lifted furniture then few days later with help  Stated  "I know my body and something is wrong, something is pressing on something "    He reports new symptoms w/ onset about 3 5 weeks ago  shortly after lifting  Right leg with increasing weakness, intermittently knee micheal , has numbness in right ankle up to a little above right knee  Iis experiencing intermittent low back spasms treating with methocarbamol  Has numbness in right groin  When walking has pain in right side low back just above right buttock  Is struggling when walking down stairs must walk sideways, right leg strength worsening and right knee bulking  He wears a right foot brace  Has left foot drop , feels he needa a Left foot brace       He denies perianal or perineal numbness, loss of bowel control, or urinary retention  He has not contacted PCP for  Physical therapy order, he responded to my commit  stating as above I know something is wrong  Loreta Swan pressing on something  Will schedule office visit on my schedule a Day Dr Vanessa Heller has clinic  Surgery 12/7/2019 ;Procedure: LAMINECTOMY LUMBAR LAMINECTOMY, DISCECTOMY, FORAMINOTOMY, L3-4, RIGHT (Right Spine Lumbar)      Patient verbalized and understanding of apple and is in agreement w/ plan

## 2019-10-05 ENCOUNTER — TELEPHONE (OUTPATIENT)
Dept: OTHER | Facility: HOSPITAL | Age: 59
End: 2019-10-05

## 2019-10-05 NOTE — TELEPHONE ENCOUNTER
Received a call from Mr Aleksandr Medina today 10/5/19 at 11 00 am  Pt is s/p Lumbar Laminectomy, discectomy, foraminotomy at L3-L4 on the right on 12/17/18 due to right foot drop  Pt has a hx of chronic left foot drop  Pt reports he had a fall last night about 8 steps down  Pt reports his right leg buckled carrying and he fell  Pt reports in the last 10 days he has noted some weakness in the right leg and is worried about the buckling  Pt reports the numbness in the right leg from ankle toward knee is worse in the last 10 days  Pt reports he has some pain about the right butt cheek  Pt reports he feels like right knee is swollen but reports it could just be the numbness  Pt reports the right foot drop is about the same as it was before and not changed since the fall last n night  Pt reports that since the fall last night he has not noted any change in numbness over the last 10 days  Pt reports pain in the right butt cheek has been about the same as it has been before and has not changed since the fall last night  Pt reports the right foot drop is the same since he fell last night  Pt feels he did not break anything as he has no acute severe pain just about the same as he felt before, however he is worried about the buckling in his right leg now that he fell  Pt already had an appointment with neurosurgery on 10/14/19 in about a week  Pt reports PT was recommended for him but he did not get to go, partly due to pricing as he would be paying out of pocket  Pt reports that about a month ago he did things he should not have been doing  Pt also reports that  he had to move/change houses about 2 weeks ago and things from carrying things he may have aggravated his back a little       Discussed with pt that given the recent fall, though he feels he does not notice any change since the fall and feels he does not need to go to the ER at this time, should he experience any new numbness, tingling, weakness, loss in control of bowel or bladder, loss or decreased in sensation, then he should go to the nearest ER for eval ASAP  Will update Dr Ehsan Valenzuela and [facility] and discuss if imaging is required prior to follow up appointment

## 2019-10-07 ENCOUNTER — HOSPITAL ENCOUNTER (EMERGENCY)
Facility: HOSPITAL | Age: 59
Discharge: HOME | End: 2019-10-07
Attending: EMERGENCY MEDICINE
Payer: COMMERCIAL

## 2019-10-07 VITALS
WEIGHT: 182 LBS | RESPIRATION RATE: 20 BRPM | SYSTOLIC BLOOD PRESSURE: 162 MMHG | TEMPERATURE: 98.8 F | DIASTOLIC BLOOD PRESSURE: 107 MMHG | HEIGHT: 75 IN | OXYGEN SATURATION: 100 % | HEART RATE: 76 BPM | BODY MASS INDEX: 22.63 KG/M2

## 2019-10-07 DIAGNOSIS — R20.0 NUMBNESS: Primary | ICD-10-CM

## 2019-10-07 PROCEDURE — 99283 EMERGENCY DEPT VISIT LOW MDM: CPT

## 2019-10-07 PROCEDURE — 99284 EMERGENCY DEPT VISIT MOD MDM: CPT | Performed by: EMERGENCY MEDICINE

## 2019-10-07 NOTE — TELEPHONE ENCOUNTER
Returned call to patient in response to information documented above   He is directed to go to the closest emergency room for  examination of his deteriorating neurologic status  They will assess and determine imagining needs, etc and if admission is necessary for further w/u  If he is admitted neurosurgery will be consulted  He otherwise has an office appointment scheduled form 10/14/2019  Stressed he is not to wait for appointment if he feels neurological condition is deteriorating  He replied he has a job to finish that he is working on then he will decide what he is going to do  He questioned if he should come to [place] , replied that may be most appropriate site since [facility] hub is [place]  He verbalized and understanding and agreement with plan

## 2019-10-07 NOTE — TELEPHONE ENCOUNTER
Patient called back this morning, he says he feels like he is getting worse since his recent fall  He complains of feeling like he has no muscle in his right thigh and numbness from the knee down  He is asking if his appointment can be moved up? Should he go to the ER? Can a new MRI be ordered?

## 2019-10-07 NOTE — ED ATTENDING ATTESTATION
10/7/2019  I, Agustin Partida DO, saw and evaluated the patient  I have discussed the patient with the resident/non-physician practitioner and agree with the resident's/non-physician practitioner's findings, Plan of Care, and MDM as documented in the resident's/non-physician practitioner's note, except where noted  All available labs and Radiology studies were reviewed  I was present for key portions of any procedure(s) performed by the resident/non-physician practitioner and I was immediately available to provide assistance  At this point I agree with the current assessment done in the Emergency Department  I have conducted an independent evaluation of this patient a history and physical is as follows:    69-year-old male patient with history of chronic left footdrop since 2002, status post lumbar laminectomy, diskectomy, foraminotomy at L3-L4 on the right December 2018 secondary to right-sided foot drop  Says that about a month ago he did some heavy lifting, had some increased pain is left-sided low back  No falls or direct trauma, does have some chronic decreased sensation per his report is bilateral lower legs  He said that 3 days ago he slipped and fell, landing on his right side when his right leg gave out  Did not hit his head or pass out  Said that since then the footdrop on the right and left are about the same  Does recount some increased pain in his right buttocks area  Feels like his right quadriceps muscles since the fall are little bit weaker than normal   He has an appointment with his neurosurgeon on October 14, 2019, says he called the office today was advised to go to the ED  Denies any bladder or bowel incontinence, denies saddle anesthesia, denies IV drug use, denies fevers, denies chills      General:  Patient is well-appearing  Head:  Atraumatic  Eyes:  Conjunctiva pink  ENT:  Mucous membranes are moist  Neck:  Supple  Cardiac:  S1-S2, without murmurs  Lungs:  Clear to auscultation bilaterally  Abdomen:  Soft, nontender, normal bowel sounds, no CVA tenderness, no tympany, no rigidity, no guarding  Extremities:  Normal range of motion passively, there is no gluteal muscle tenderness on either side, no rash, no signs of ecchymosis No bony tenderness to the bilateral bilateral humeral heads, humerus, elbows, radius, ulna, hands, hips, femurs, knees, tibia, fibula, feet  No pain with passive range of motion at the bilateral shoulders, elbows, wrists, hips, knees, or ankles  His compartments are soft bilaterally  DP and PT pulses are intact and symmetric in the bilateral feet  No midline cervical, thoracic, lumbar, sacral tenderness, deformities, or step-offs  Neurologic:  Awake, fluent speech, normal comprehension  AAOx3  Strength is 5/5 of the bilateral shoulders, elbows, wrists  He does have bilateral footdrop at the ankles, he can dorsiflex and wiggle the toes including dorsiflex the great toe limitedly both sides  Strength is 5/5 in his bilateral hips, knees  His sensation to light touch he tells me is intact in both legs, although he says it feels a little bit less intense in a stocking-like distribution from his knees down  There is no rash or warmth to either leg  I can't elicit reflexes in either his ankles, reflexes are 2 out of for the bilateral patellas  Skin:  Pink warm and dry  Psychiatric:  Alert, pleasant, cooperative            ED Course     Patient has no bony tenderness or evidence of trauma from the fall  No evidence of acute neurosurgical emergency on examination  Patient's neurosurgical team was contacted, the attending neurosurgeon requested that a MRI be obtained as an inpatient but also indicated that if the patient was stable, this could be performed as an outpatient  Patient did not want to be admitted for the MRIs, indicated he would follow up as an outpatient      Critical Care Time  Procedures

## 2019-10-07 NOTE — TREATMENT PLAN
Spoke to trauma resident regarding patient  He is status post lumbar laminectomy, diskectomy, foraminotomy at L3-4 on the right on December 17, 2018 due to right foot drop  Patient has chronic left footdrop  Patient presents to the ED status post fall a few days ago, stating he has right lower extremity weakness, feels like it is going to buckle  He also reports some associated numbness and tingling  Per trauma resident, patient exhibits bilateral footdrop on exam but does not appreciate significant lower extremity weakness  He is found to be ambulating around the department  Spoke to attending Dr Eh Gramajo - would recommend patient get MRI lumbar and thoracic spine imaging to rule out acute etiology given symptoms have been going on for about a week now without improvement  [facility] will examine patient in the morning  Please call with questions or concerns

## 2019-10-08 ENCOUNTER — HOSPITAL ENCOUNTER (OUTPATIENT)
Facility: HOSPITAL | Age: 59
Setting detail: OBSERVATION
Discharge: HOME | End: 2019-10-10
Attending: EMERGENCY MEDICINE | Admitting: INTERNAL MEDICINE
Payer: MEDICAID

## 2019-10-08 ENCOUNTER — TELEPHONE (OUTPATIENT)
Dept: NEUROSURGERY | Facility: CLINIC | Age: 59
End: 2019-10-08

## 2019-10-08 DIAGNOSIS — M21.371 RIGHT FOOT DROP: ICD-10-CM

## 2019-10-08 DIAGNOSIS — S23.3XXA SPRAIN OF UPPER BACK, INITIAL ENCOUNTER: ICD-10-CM

## 2019-10-08 DIAGNOSIS — F51.01 PRIMARY INSOMNIA: ICD-10-CM

## 2019-10-08 DIAGNOSIS — R29.898 WEAKNESS OF RIGHT LOWER EXTREMITY: Primary | ICD-10-CM

## 2019-10-08 DIAGNOSIS — M21.372 LEFT FOOT DROP: ICD-10-CM

## 2019-10-08 LAB
ANION GAP SERPL CALCULATED.3IONS-SCNC: 6 MMOL/L (ref 4–13)
BASOPHILS # BLD AUTO: 0.05 THOUSANDS/ΜL (ref 0–0.1)
BASOPHILS NFR BLD AUTO: 1 % (ref 0–1)
BUN SERPL-MCNC: 23 MG/DL (ref 5–25)
CALCIUM SERPL-MCNC: 9 MG/DL (ref 8.3–10.1)
CHLORIDE SERPL-SCNC: 106 MMOL/L (ref 100–108)
CO2 SERPL-SCNC: 30 MMOL/L (ref 21–32)
CREAT SERPL-MCNC: 1.05 MG/DL (ref 0.6–1.3)
EOSINOPHIL # BLD AUTO: 0.14 THOUSAND/ΜL (ref 0–0.61)
EOSINOPHIL NFR BLD AUTO: 1 % (ref 0–6)
ERYTHROCYTE [DISTWIDTH] IN BLOOD BY AUTOMATED COUNT: 12.8 % (ref 11.6–15.1)
GFR SERPL CREATININE-BSD FRML MDRD: 77 ML/MIN/1.73SQ M
GLUCOSE SERPL-MCNC: 82 MG/DL (ref 65–140)
HCT VFR BLD AUTO: 46.7 % (ref 36.5–49.3)
HGB BLD-MCNC: 15.4 G/DL (ref 12–17)
IMM GRANULOCYTES # BLD AUTO: 0.04 THOUSAND/UL (ref 0–0.2)
IMM GRANULOCYTES NFR BLD AUTO: 0 % (ref 0–2)
LYMPHOCYTES # BLD AUTO: 2.05 THOUSANDS/ΜL (ref 0.6–4.47)
LYMPHOCYTES NFR BLD AUTO: 19 % (ref 14–44)
MCH RBC QN AUTO: 30.5 PG (ref 26.8–34.3)
MCHC RBC AUTO-ENTMCNC: 33 G/DL (ref 31.4–37.4)
MCV RBC AUTO: 93 FL (ref 82–98)
MONOCYTES # BLD AUTO: 0.87 THOUSAND/ΜL (ref 0.17–1.22)
MONOCYTES NFR BLD AUTO: 8 % (ref 4–12)
NEUTROPHILS # BLD AUTO: 7.69 THOUSANDS/ΜL (ref 1.85–7.62)
NEUTS SEG NFR BLD AUTO: 71 % (ref 43–75)
NRBC BLD AUTO-RTO: 0 /100 WBCS
PLATELET # BLD AUTO: 255 THOUSANDS/UL (ref 149–390)
PMV BLD AUTO: 9.5 FL (ref 8.9–12.7)
POTASSIUM SERPL-SCNC: 4.2 MMOL/L (ref 3.5–5.3)
RBC # BLD AUTO: 5.05 MILLION/UL (ref 3.88–5.62)
SODIUM SERPL-SCNC: 142 MMOL/L (ref 136–145)
WBC # BLD AUTO: 10.84 THOUSAND/UL (ref 4.31–10.16)

## 2019-10-08 PROCEDURE — 99285 EMERGENCY DEPT VISIT HI MDM: CPT

## 2019-10-08 PROCEDURE — 99284 EMERGENCY DEPT VISIT MOD MDM: CPT | Performed by: EMERGENCY MEDICINE

## 2019-10-08 PROCEDURE — 99220 PR INITIAL OBSERVATION CARE/DAY 70 MINUTES: CPT | Performed by: INTERNAL MEDICINE

## 2019-10-08 PROCEDURE — 85025 COMPLETE CBC W/AUTO DIFF WBC: CPT | Performed by: EMERGENCY MEDICINE

## 2019-10-08 PROCEDURE — 80048 BASIC METABOLIC PNL TOTAL CA: CPT | Performed by: EMERGENCY MEDICINE

## 2019-10-08 PROCEDURE — 36415 COLL VENOUS BLD VENIPUNCTURE: CPT | Performed by: EMERGENCY MEDICINE

## 2019-10-08 RX ORDER — LIDOCAINE 50 MG/G
1 PATCH TOPICAL DAILY
Status: DISCONTINUED | OUTPATIENT
Start: 2019-10-08 | End: 2019-10-10 | Stop reason: HOSPADM

## 2019-10-08 RX ORDER — AMLODIPINE BESYLATE 2.5 MG/1
2.5 TABLET ORAL DAILY
Status: DISCONTINUED | OUTPATIENT
Start: 2019-10-09 | End: 2019-10-10 | Stop reason: HOSPADM

## 2019-10-08 RX ORDER — METHOCARBAMOL 750 MG/1
750 TABLET, FILM COATED ORAL EVERY 6 HOURS SCHEDULED
Status: DISCONTINUED | OUTPATIENT
Start: 2019-10-09 | End: 2019-10-10 | Stop reason: HOSPADM

## 2019-10-08 RX ORDER — TAMSULOSIN HYDROCHLORIDE 0.4 MG/1
0.4 CAPSULE ORAL DAILY
Status: DISCONTINUED | OUTPATIENT
Start: 2019-10-09 | End: 2019-10-09

## 2019-10-08 RX ORDER — TRAZODONE HYDROCHLORIDE 50 MG/1
50 TABLET ORAL
Status: DISCONTINUED | OUTPATIENT
Start: 2019-10-08 | End: 2019-10-10 | Stop reason: HOSPADM

## 2019-10-08 RX ORDER — ACETAMINOPHEN 325 MG/1
650 TABLET ORAL EVERY 6 HOURS PRN
Status: DISCONTINUED | OUTPATIENT
Start: 2019-10-08 | End: 2019-10-10 | Stop reason: HOSPADM

## 2019-10-08 RX ORDER — VALACYCLOVIR HYDROCHLORIDE 500 MG/1
500 TABLET, FILM COATED ORAL 2 TIMES DAILY
Status: DISCONTINUED | OUTPATIENT
Start: 2019-10-09 | End: 2019-10-10 | Stop reason: HOSPADM

## 2019-10-08 RX ORDER — TRAZODONE HYDROCHLORIDE 50 MG/1
50 TABLET ORAL
Qty: 90 TABLET | Refills: 1 | Status: SHIPPED | OUTPATIENT
Start: 2019-10-08 | End: 2019-10-30 | Stop reason: SDUPTHER

## 2019-10-08 NOTE — TELEPHONE ENCOUNTER
Returned apple to patient reports he returned to ED again , is there now with hope he can get MRI by tomorrow  Reports after he was called by ED today as a f/u for last evening visit , thought he should return and try to get MRI  Informed patient I will follow records  We will discuss during office visit 10/14, apple before taht time for any questions or concerns

## 2019-10-08 NOTE — TELEPHONE ENCOUNTER
Staff message received requesting the pt be called to check on symptoms    Pt has already called the office, and message transmitted to Les

## 2019-10-08 NOTE — TELEPHONE ENCOUNTER
Patient called office today complaining of loss of muscle strength in thigh and need for MRI - Also said he was going to be called today for an appointment  I explained to patient he already has appointment scheduled for 10/14/19  Patient went to ED yesterday and it was recommended he stay and be evaluated by [facility] in the morning  Patient did not stay and is calling about his deteriorating condition  I advised patient he will receive a CB from the [address] [place] [address] to discuss

## 2019-10-09 ENCOUNTER — TELEPHONE (OUTPATIENT)
Dept: NEUROSURGERY | Facility: CLINIC | Age: 59
End: 2019-10-09

## 2019-10-09 ENCOUNTER — APPOINTMENT (OUTPATIENT)
Dept: RADIOLOGY | Facility: HOSPITAL | Age: 59
End: 2019-10-09
Payer: MEDICAID

## 2019-10-09 PROCEDURE — 72158 MRI LUMBAR SPINE W/O & W/DYE: CPT

## 2019-10-09 PROCEDURE — A9585 GADOBUTROL INJECTION: HCPCS | Performed by: EMERGENCY MEDICINE

## 2019-10-09 PROCEDURE — 99244 OFF/OP CNSLTJ NEW/EST MOD 40: CPT | Performed by: PHYSICIAN ASSISTANT

## 2019-10-09 PROCEDURE — 99225 PR SBSQ OBSERVATION CARE/DAY 25 MINUTES: CPT | Performed by: PHYSICIAN ASSISTANT

## 2019-10-09 PROCEDURE — 72157 MRI CHEST SPINE W/O & W/DYE: CPT

## 2019-10-09 RX ORDER — TAMSULOSIN HYDROCHLORIDE 0.4 MG/1
0.8 CAPSULE ORAL
Status: DISCONTINUED | OUTPATIENT
Start: 2019-10-10 | End: 2019-10-10 | Stop reason: HOSPADM

## 2019-10-09 RX ORDER — TAMSULOSIN HYDROCHLORIDE 0.4 MG/1
0.4 CAPSULE ORAL ONCE
Status: COMPLETED | OUTPATIENT
Start: 2019-10-09 | End: 2019-10-09

## 2019-10-09 RX ADMIN — METHOCARBAMOL 750 MG: 750 TABLET, FILM COATED ORAL at 18:14

## 2019-10-09 RX ADMIN — LIDOCAINE 1 PATCH: 50 PATCH CUTANEOUS at 01:19

## 2019-10-09 RX ADMIN — TAMSULOSIN HYDROCHLORIDE 0.4 MG: 0.4 CAPSULE ORAL at 23:07

## 2019-10-09 RX ADMIN — GADOBUTROL 8 ML: 604.72 INJECTION INTRAVENOUS at 22:07

## 2019-10-09 RX ADMIN — METHOCARBAMOL 750 MG: 750 TABLET, FILM COATED ORAL at 11:46

## 2019-10-09 RX ADMIN — LIDOCAINE 1 PATCH: 50 PATCH CUTANEOUS at 09:22

## 2019-10-09 RX ADMIN — ACETAMINOPHEN 650 MG: 325 TABLET ORAL at 16:56

## 2019-10-09 RX ADMIN — VALACYCLOVIR HYDROCHLORIDE 500 MG: 500 TABLET, FILM COATED ORAL at 18:14

## 2019-10-09 RX ADMIN — METHOCARBAMOL 750 MG: 750 TABLET, FILM COATED ORAL at 23:11

## 2019-10-09 RX ADMIN — METHOCARBAMOL 750 MG: 750 TABLET, FILM COATED ORAL at 05:52

## 2019-10-09 RX ADMIN — TAMSULOSIN HYDROCHLORIDE 0.4 MG: 0.4 CAPSULE ORAL at 18:14

## 2019-10-09 RX ADMIN — METHOCARBAMOL 750 MG: 750 TABLET, FILM COATED ORAL at 01:19

## 2019-10-09 RX ADMIN — TRAZODONE HYDROCHLORIDE 50 MG: 50 TABLET ORAL at 01:19

## 2019-10-09 RX ADMIN — VALACYCLOVIR HYDROCHLORIDE 500 MG: 500 TABLET, FILM COATED ORAL at 09:21

## 2019-10-09 RX ADMIN — ENOXAPARIN SODIUM 40 MG: 40 INJECTION SUBCUTANEOUS at 09:21

## 2019-10-09 RX ADMIN — TRAZODONE HYDROCHLORIDE 50 MG: 50 TABLET ORAL at 23:07

## 2019-10-09 RX ADMIN — AMLODIPINE BESYLATE 2.5 MG: 2.5 TABLET ORAL at 09:21

## 2019-10-09 NOTE — UTILIZATION REVIEW
Initial Clinical Review    Admission: Date/Time/Statement:  10/08/19 2143  Orders Placed This Encounter   Procedures    Place in Observation (expected length of stay for this patient is less than two midnights)     Standing Status:   Standing     Number of Occurrences:   1     Order Specific Question:   Admitting Physician     Answer:   Milly Youngblood [[id number]]     Order Specific Question:   Level of Care     Answer:   Med Surg [[id number]]     ED Arrival Information     Expected Arrival Acuity Means of Arrival Escorted By Service Admission Type    - 10/8/2019 17:43 Urgent Walk-In Self General Medicine Urgent    Arrival Complaint    numbness and pain of extremities        Chief Complaint   Patient presents with    Extremity Weakness     P c/o of numbness and weakness in RLE since Saturday  Pt evaluated yesterday but refused admission  Pt reports symptoms are not improving and he is willing to stay     Assessment/Plan:     60year old male presents to ed from home for evaluation and treatment of right lower extremity weakness   PMHX  Lumbar laminectomy and diskectomy 1 year ago  He was evaluated 1 day ago in ed  Neuro surgery consulted and MRI recommended  Patient opted for discharge   He now returns for the neurosurgery consult and MRI  No treatment in ed  Admit to observation for right lower extremity  Plan to   Consult neurosurgery, initiate neuro checks, obtain MRI and therapy evaluations  Neuro surgery consult  Completed  Await mri results    No need for surgery    ED Triage Vitals   10/08/19 1800 10/08/19 1800 10/08/19 1800 10/08/19 1800 10/08/19 1800   98 °F (36 7 °C) 86 16 (!) 176/104 97 %      Oral          Pain Score       5       10/08/19 82 6 kg (182 lb 1 6 oz)     Additional Vital Signs:     Date/Time  Temp  Pulse  Resp  BP  MAP (mmHg)  SpO2  O2 Device  Patient Position - Orthostatic VS   10/09/19 07:20:23  98 °F (36 7 °C)  67  19  146/88  107  98 %       10/08/19 22:21:12  98 3 °F (36 8 °C) 73  18  163/108Abnormal   126  100 %       10/08/19 2126    76    156/88    97 %  None (Room air)               Pertinent Labs/Diagnostic Test Results:       Results from last 7 days   Lab Units 10/08/19  2159   WBC Thousand/uL 10 84*   HEMOGLOBIN g/dL 15 4   HEMATOCRIT % 46 7   PLATELETS Thousands/uL 255   NEUTROS ABS Thousands/µL 7 69*         Results from last 7 days   Lab Units 10/08/19  2159   SODIUM mmol/L 142   POTASSIUM mmol/L 4 2   CHLORIDE mmol/L 106   CO2 mmol/L 30   ANION GAP mmol/L 6   BUN mg/dL 23   CREATININE mg/dL 1 05   EGFR ml/min/1 73sq m 77   CALCIUM mg/dL 9 0             Results from last 7 days   Lab Units 10/08/19  2159   GLUCOSE RANDOM mg/dL 82         ED Treatment:   Medication Administration from 10/08/2019 1743 to 10/08/2019 2213     None        Past Medical History:   Diagnosis Date    Dry skin dermatitis     Dyspepsia     last assessed 09/27/17    Hypertension      Present on Admission:   Right foot drop   Hypertension   History of bladder cancer   BPH      Admitting Diagnosis:     Numbness and tingling [R20 0, R20 2]  Weakness of right lower extremity [R29 898]    Age/Sex: 60 y o  male     Admission Orders:    Neuro checks q4 hr  PT OT eval and treat  :  acetaminophen 650 mg Oral Q6H PRN   amLODIPine 2 5 mg Oral Daily   enoxaparin 40 mg Subcutaneous Daily   lidocaine 1 patch Topical Daily   methocarbamol 750 mg Oral Q6H [facility]   tamsulosin 0 4 mg Oral Daily   traZODone 50 mg Oral HS   valACYclovir 500 mg Oral BID       IP CONSULT TO NEUROSURGERY    Network Utilization Review Department  Phone: [phone]; Fax [phone]  [email or web address]  ATTENTION: Please call with any questions or concerns to [phone]  and carefully listen to the prompts so that you are directed to the right person  Send all requests for admission clinical reviews, approved or denied determinations and any other requests to fax [phone]   All voicemails are confidential

## 2019-10-09 NOTE — ASSESSMENT & PLAN NOTE
· Patient complaining of RLE numbness and weakness after lifting heavy object about 1 month ago and subsequently falling down the stairs over the weekend due to right leg buckling  · S/p LAMINECTOMY LUMBAR LAMINECTOMY, DISCECTOMY, FORAMINOTOMY, L3-4, RIGHT in 12/2018 for right foot drop which is persistent  · Was seen in the ED on 10/7 and discharged but presented again to expedite workup    Plan:  · MRI thoracic and lumbar spine ordered and pending to assess for acute etiology  · Medical management and pain control per primary team  · DVT prophylaxis:  SCDs and lovenox  · Mobilize as tolerated with assistance, PT/OT evaluation    Neurosurgery will follow as needed until imaging is completed  Patient is not exhibiting any significant red flag signs  At this time surgery is not warranted until imaging is back  Please call with questions or concerns

## 2019-10-09 NOTE — ED PROVIDER NOTES
History  Chief Complaint   Patient presents with    Extremity Weakness     P c/o of numbness and weakness in RLE since Saturday  Pt evaluated yesterday but refused admission  Pt reports symptoms are not improving and he is willing to stay     79-year-old man with a past medical history of hypertension, chronic back pain status post lumbar laminectomy, diskectomy 1 year ago presents for evaluation right lower extremity weakness  Patient has had gradually worsening weakness in his right leg since his last surgery 1 year ago  He has noticed atrophy in his right thigh and a gradually worsening foot drop his right leg  Patient also has a history of foot drop in his left foot  Patient was evaluated here yesterday for his gradually worsening symptoms  Neurosurgery is consulted and Dr Dequan Rutledge recommended admission and MRI of thoracic and lumbar spines  Patient opted to be discharged and went home  He returns today requesting admission for MRI and neurosurgery evaluation  He denies any change in symptoms since his evaluation yesterday  He denies any urinary incontinence, saddle anesthesia, new trauma  Prior to Admission Medications   Prescriptions Last Dose Informant Patient Reported? Taking?    amLODIPine (NORVASC) 2 5 mg tablet 10/8/2019 at Unknown time Self No Yes   Sig: Take 1 tablet (2 5 mg total) by mouth daily   methocarbamol (ROBAXIN) 750 mg tablet   No No   Sig: Take 1 tablet (750 mg total) by mouth every 6 (six) hours as needed for muscle spasms for up to 14 days   tamsulosin (FLOMAX) 0 4 mg 10/8/2019 at Unknown time Self Yes Yes   Sig: Take 1 capsule by mouth daily   traZODone (DESYREL) 50 mg tablet Past Week at Unknown time  No Yes   Sig: Take 1 tablet (50 mg total) by mouth daily at bedtime   valACYclovir (VALTREX) 500 mg tablet Past Week at Unknown time Self Yes Yes   Sig: Take 500 mg by mouth 2 (two) times a day      Facility-Administered Medications: None       Past Medical History: Diagnosis Date    Dry skin dermatitis     Dyspepsia     last assessed 09/27/17    Hypertension        Past Surgical History:   Procedure Laterality Date    BACK SURGERY Left     left with foot drop after surgery    BLADDER SURGERY      POSTERIOR LAMINECTOMY THORACIC AND LUMBAR SPINE Right 12/17/2018    Procedure: LAMINECTOMY LUMBAR LAMINECTOMY, DISCECTOMY, FORAMINOTOMY, L3-4, RIGHT;  Surgeon: Isidro Barragan MD;  Location: [facility];  Service: Neurosurgery       Family History   Problem Relation Age of Onset    Stomach cancer Mother     Raynaud syndrome Mother     Heart attack Father     Heart attack Brother      I have reviewed and agree with the history as documented  Social History     Tobacco Use    Smoking status: Never Smoker    Smokeless tobacco: Never Used   Substance Use Topics    Alcohol use: Yes     Frequency: Monthly or less    Drug use: No        Review of Systems   Constitutional: Negative for appetite change, chills, diaphoresis, fatigue and fever  HENT: Negative for congestion, rhinorrhea and sore throat  Respiratory: Negative for apnea, cough, choking, chest tightness, shortness of breath, wheezing and stridor  Cardiovascular: Negative for chest pain, palpitations and leg swelling  Gastrointestinal: Negative for abdominal distention, abdominal pain, constipation, diarrhea, nausea and vomiting  Genitourinary: Negative for dysuria and hematuria  Musculoskeletal: Negative for back pain, neck pain and neck stiffness  Skin: Negative for pallor, rash and wound  Neurological: Positive for weakness (b/l LE)  Negative for dizziness, light-headedness and headaches  Psychiatric/Behavioral: Negative for behavioral problems and confusion         Physical Exam  ED Triage Vitals   Temperature Pulse Respirations Blood Pressure SpO2   10/08/19 1800 10/08/19 1800 10/08/19 1800 10/08/19 1800 10/08/19 1800   98 °F (36 7 °C) 86 16 (!) 176/104 97 %      Temp Source Heart Rate Source Patient Position - Orthostatic VS BP Location FiO2 (%)   10/08/19 1800 10/08/19 2126 10/08/19 1800 10/08/19 1800 --   Oral Monitor Sitting Left arm       Pain Score       10/08/19 1800       5             Orthostatic Vital Signs  Vitals:    10/09/19 0720 10/09/19 1511 10/09/19 2245 10/10/19 0733   BP: 146/88 137/87 151/95 157/95   Pulse: 67 68     Patient Position - Orthostatic VS:           Physical Exam   Constitutional: He is oriented to person, place, and time  He appears well-developed and well-nourished  No distress  HENT:   Head: Normocephalic and atraumatic  Eyes: Pupils are equal, round, and reactive to light  Conjunctivae and EOM are normal  No scleral icterus  Neck: Normal range of motion  Neck supple  Cardiovascular: Normal rate, regular rhythm, normal heart sounds and intact distal pulses  No murmur heard  Pulmonary/Chest: Effort normal and breath sounds normal  No respiratory distress  He has no wheezes  Abdominal: Soft  Bowel sounds are normal  He exhibits no distension  There is no tenderness  Musculoskeletal: Normal range of motion  He exhibits no edema or deformity  Neurological: He is alert and oriented to person, place, and time  Bilateral foot drop  Right quadriceps atrophy  Skin: Skin is warm and dry  No rash noted  He is not diaphoretic  Psychiatric: He has a normal mood and affect  His behavior is normal    Nursing note and vitals reviewed        ED Medications  Medications   gadobutrol injection (MULTI-DOSE) SOLN 8 mL (8 mL Intravenous Given 10/9/19 2207)   tamsulosin (FLOMAX) capsule 0 4 mg (0 4 mg Oral Given 10/9/19 2307)       Diagnostic Studies  Results Reviewed     Procedure Component Value Units Date/Time    Basic metabolic panel [[id number]] Collected:  10/08/19 2159    Lab Status:  Final result Specimen:  Blood from Arm, Left Updated:  10/08/19 2231     Sodium 142 mmol/L      Potassium 4 2 mmol/L      Chloride 106 mmol/L      CO2 30 mmol/L      ANION GAP 6 mmol/L      BUN 23 mg/dL      Creatinine 1 05 mg/dL      Glucose 82 mg/dL      Calcium 9 0 mg/dL      eGFR 77 ml/min/1 73sq m     Narrative:       [place] guidelines for Chronic Kidney Disease (CKD):     Stage 1 with normal or high GFR (GFR > 90 mL/min/1 73 square meters)    Stage 2 Mild CKD (GFR = 60-89 mL/min/1 73 square meters)    Stage 3A Moderate CKD (GFR = 45-59 mL/min/1 73 square meters)    Stage 3B Moderate CKD (GFR = 30-44 mL/min/1 73 square meters)    Stage 4 Severe CKD (GFR = 15-29 mL/min/1 73 square meters)    Stage 5 End Stage CKD (GFR <15 mL/min/1 73 square meters)  Note: GFR calculation is accurate only with a steady state creatinine    CBC and differential [[id number]]  (Abnormal) Collected:  10/08/19 2159    Lab Status:  Final result Specimen:  Blood from Arm, Left Updated:  10/08/19 2211     WBC 10 84 Thousand/uL      RBC 5 05 Million/uL      Hemoglobin 15 4 g/dL      Hematocrit 46 7 %      MCV 93 fL      MCH 30 5 pg      MCHC 33 0 g/dL      RDW 12 8 %      MPV 9 5 fL      Platelets 825 Thousands/uL      nRBC 0 /100 WBCs      Neutrophils Relative 71 %      Immat GRANS % 0 %      Lymphocytes Relative 19 %      Monocytes Relative 8 %      Eosinophils Relative 1 %      Basophils Relative 1 %      Neutrophils Absolute 7 69 Thousands/µL      Immature Grans Absolute 0 04 Thousand/uL      Lymphocytes Absolute 2 05 Thousands/µL      Monocytes Absolute 0 87 Thousand/µL      Eosinophils Absolute 0 14 Thousand/µL      Basophils Absolute 0 05 Thousands/µL                  MRI thoracic spine w wo contrast   Final Result by Brooke Stephenson MD (10/10 9041)      Lower cervical and multilevel thoracic degenerative discogenic disease as described above  The notable disc herniations are noted on the left at T1-T2, on the left at T8-T9, and on the right at T10-T11    There is ventral indentation on the thecal sac    and mild flattening of the ventral cord margin without cord signal abnormality  Additional smaller disc herniations without spinal canal or foraminal stenosis as described above  No cord signal abnormality or pathologic enhancement  Workstation performed: [id number]         MRI lumbar spine w wo contrast   Final Result by Santiago Horan MD (10/10 2008)      New right L2-3 paracentral disc extrusion with inferior migration into the lateral recess impinging on the traversing right L3 nerve root  Surgical consultation recommended  New shallow right central disc herniation with mild right ventral thecal sac indentation at L1-L2  Interval discectomy of the previously noted right L3-4 disc extrusion  Stable central to left central disc herniation at L3-L4 with left subarticular recess encroachment, correlate for chronic left L4 radiculopathy  Chronic bilateral L4-5 and L5-S1 foraminal stenosis  Postoperative changes of right L3-4 and left L4-5 and L5-S1 laminotomy  Stable grade 1 anterolisthesis of L5 on S1 with chronic bilateral L5 spondylolysis  The study was marked in EPIC for significant notification  Workstation performed: [id number]               Procedures  Procedures        ED Course                               MDM  Number of Diagnoses or Management Options  Weakness of right lower extremity: new and requires workup  Diagnosis management comments: Patient presented here yesterday with the same complaint  Neurosurgery was consulted and recommended admission for MRI  Patient has not returned and request admission  Will admit to slim  MRI thoracic and lumbar spine ordered         Amount and/or Complexity of Data Reviewed  Tests in the radiology section of CPT®: ordered  Decide to obtain previous medical records or to obtain history from someone other than the patient: yes  Obtain history from someone other than the patient: yes  Review and summarize past medical records: yes  Discuss the patient with other providers: yes  Independent visualization of images, tracings, or specimens: yes    Risk of Complications, Morbidity, and/or Mortality  Presenting problems: low  Diagnostic procedures: low  Management options: low    Patient Progress  Patient progress: stable      Disposition  Final diagnoses:   Weakness of right lower extremity     Time reflects when diagnosis was documented in both MDM as applicable and the Disposition within this note     Time User Action Codes Description Comment    10/8/2019  9:43 PM Beverly Torres Add [R29 898] Weakness of right lower extremity     10/10/2019  2:01 PM Jacqueline Comas Add [M21 371] Right foot drop     10/10/2019  2:01 PM Jacqueline Comas Add [M21 372] Left foot drop     10/10/2019  2:01 PM Diya Glez Add [S23  3XXA] Sprain of upper back, initial encounter       ED Disposition     ED Disposition Condition Date/Time Comment    Admit Stable Tue Oct 8, 2019  9:44 PM Case was discussed with MERISSA and the patient's admission status was agreed to be Admission Status: observation status to the service of Dr Teodoro Choi          Follow-up Information    None         Discharge Medication List as of 10/10/2019  3:00 PM      START taking these medications    Details   methylPREDNISolone 4 MG tablet therapy pack Use as directed on package, Normal         CONTINUE these medications which have NOT CHANGED    Details   amLODIPine (NORVASC) 2 5 mg tablet Take 1 tablet (2 5 mg total) by mouth daily, Starting Fri 5/18/2018, Normal      tamsulosin (FLOMAX) 0 4 mg Take 1 capsule by mouth daily, Historical Med      traZODone (DESYREL) 50 mg tablet Take 1 tablet (50 mg total) by mouth daily at bedtime, Starting Tue 10/8/2019, Until Mon 1/6/2020, Normal      valACYclovir (VALTREX) 500 mg tablet Take 500 mg by mouth 2 (two) times a day, Starting Sat 2/3/2018, Historical Med      methocarbamol (ROBAXIN) 750 mg tablet Take 1 tablet (750 mg total) by mouth every 6 (six) hours as needed for muscle spasms for up to 14 days, Starting Mon 9/16/2019, Until Mon 9/30/2019, Normal           Outpatient Discharge Orders   Orthotics B/L     Ambulatory referral to Neurosurgery   Standing Status: Future Standing Exp  Date: 10/10/20      Discharge Diet       ED Provider  Attending physically available and evaluated Rosa Gonsalves I managed the patient along with the ED Attending      Electronically Signed by         Santo Mcintyre MD  10/12/19 9704

## 2019-10-09 NOTE — PHYSICAL THERAPY NOTE
Eval deferred, as pt pending neurosurgery consult  Will follow p their eval as appropriate    Sita Zuniga PT, DPT CSRS

## 2019-10-09 NOTE — TELEPHONE ENCOUNTER
CALLED INS -[phone] AND SPOKE TO VANE WHO STATES GROUP NPI NOT PAR   CALLED HECTOR ANDUJAR DSPOKE TO [facility] WHO [facility] NON PAR AS WELL AS Oscar RESTREPO

## 2019-10-09 NOTE — ED ATTENDING ATTESTATION
10/8/2019  IEdgard DO, saw and evaluated the patient  I have discussed the patient with the resident/non-physician practitioner and agree with the resident's/non-physician practitioner's findings, Plan of Care, and MDM as documented in the resident's/non-physician practitioner's note, except where noted  All available labs and Radiology studies were reviewed  I was present for key portions of any procedure(s) performed by the resident/non-physician practitioner and I was immediately available to provide assistance  At this point I agree with the current assessment done in the Emergency Department  I have conducted an independent evaluation of this patient a history and physical is as follows:    9year-old male patient with history of chronic left foot drop since 2002, status post L3-L4 diskectomy, foraminotomy and lumbar laminectomy December 2018 because of right-sided foot drop presents because of decreased strength in his right leg  About a month ago he did some heavy lifting had some increased pain on his left low back, did not have any fall or any direct trauma  4 days ago he slipped and fell, when his right leg gave out  He did not hit his head or pass out  Since then he has noticed feeling like his right leg a little bit weaker  Does have chronic bilateral foot drop  Was seen yesterday in the emergency department at the advice of his neurosurgeon who we spoke with, examination showed no signs of acute neurosurgical emergency, however on discussion with Neurosurgery, they had recommend the patient be admitted for MRI and expedited workup  He was offered admission every declined, but spoke again with Neurosurgery, and presents tonight because he does want to be admitted to have further workup    Denies any bladder or bowel incontinence, denies saddle anesthesia, denies IV drug use        General:  Patient is well-appearing  Head:  Atraumatic  Eyes:  Conjunctiva pink  ENT:  Mucous membranes are moist  Neck:  Supple  Cardiac:  S1-S2, without murmurs  Lungs:  Clear to auscultation bilaterally  Abdomen:  Soft, nontender, normal bowel sounds, no CVA tenderness, no tympany, no rigidity, no guarding  Extremities:   his extremities are visibly atraumatic, there is normal passive range of motion of the bilateral hips, knees, ankles, shoulders, elbows, wrists  There is no gluteal muscle tenderness on either side, no rash, no signs of ecchymosis No bony tenderness to the bilateral bilateral humeral heads, humerus, elbows, radius, ulna, hands, hips, femurs, knees, tibia, fibula, feet  His compartments are soft bilaterally  DP and PT pulses are intact and symmetric in the bilateral feet  No midline cervical, thoracic, lumbar, sacral tenderness, deformities, or step-offs  Neurologic:  Awake, fluent speech, normal comprehension  AAOx3  Strength is 5/5 at the bilateral shoulders, elbows, wrists  He does have bilateral footdrop at the ankles, he can dorsiflex and wiggle the toes  Strength is 5/5 in his bilateral hips, knees  His sensation to light touch he tells me is intact in both legs, although he says it feels a little bit less intense in a stocking-like distribution from his knees down  There is no rash or warmth to either leg  Reflexes are 2 out of for the bilateral patellas, I cannot elicit reflexes at the bilateral ankles    Skin:  Pink warm and dry, no rash  Psychiatric:  Alert, pleasant, cooperative             ED Course         Critical Care Time  Procedures

## 2019-10-09 NOTE — PROGRESS NOTES
Progress Note - Kimberly Angel 1960, 60 y o  male MRN: [id number]  Unit/Bed#: -01 Encounter: [id number]  Primary Care Provider: ABNER Bender   Date and time admitted to hospital: 10/8/2019  7:21 PM    * Right foot drop  Assessment & Plan  · Presenting with 4-5 day history of back pain with right foot drop and subjective right leg weakness  · Does have history of similar and underwent L3-4 laminectomy 12/2018  Also has history of left foot drop undergoing surgery in [place] for this however persists with left foot drop  · Initially seen in the ED 10/07/2019 with this complaint and patient desiring outpatient workup, however patient presented again today to expedite workup and is agreeable to inpatient workup  · MRI lumbar and thoracic spine: pending  · Neurosurgical consult: pending  · Initiate neuro checks  · Continue Robaxin, add Lidoderm patch    History of bladder cancer  Assessment & Plan  · Has been treated for this in the past   Outpatient urology follow-up as indicated    BPH  Assessment & Plan  · Stable and will continue Flomax    Essential hypertension  Assessment & Plan  · Fairly stable on amlodipine  VTE Pharmacologic Prophylaxis:   Pharmacologic: Enoxaparin (Lovenox)  Mechanical: Mechanical VTE prophylaxis in place  Patient Centered Rounds: I have performed bedside rounds with nursing staff today  Discussions with Specialists or Other Care Team Provider: None  Education and Discussions with Family / Patient: All patient questions answered to the best of my ability  Time Spent for Care: 20 minutes  More than 50% of total time spent on counseling and coordination of care as described above  Current Length of Stay: 0 day(s)  Current Patient Status: Observation   Certification Statement: Continue observation status    Discharge Plan:  Patient is not medically stable for discharge at this time  MRI and neurosurgical evaluations are pending    Code Status: Level 1 - Full Code    Subjective:   Patient continues to complain of numbness in his right lower leg  He also has some weakness remaining in his right lower extremity  He has some mild back pain  He is currently frustrated waiting on his MRI  Objective:   Vitals:   Temp (24hrs), Av 1 °F (36 7 °C), Min:98 °F (36 7 °C), Max:98 3 °F (36 8 °C)    Temp:  [98 °F (36 7 °C)-98 3 °F (36 8 °C)] 98 °F (36 7 °C)  HR:  [67-86] 67  Resp:  [16-19] 19  BP: (146-176)/() 146/88  SpO2:  [97 %-100 %] 98 %  Body mass index is 22 76 kg/m²  Input and Output Summary (last 24 hours): Intake/Output Summary (Last 24 hours) at 10/9/2019 1334  Last data filed at 10/9/2019 1100  Gross per 24 hour   Intake 600 ml   Output 600 ml   Net 0 ml       Physical Exam:     Physical Exam   HENT:   Head: Normocephalic and atraumatic  Mouth/Throat: Oropharynx is clear and moist and mucous membranes are normal    Eyes: No scleral icterus  Cardiovascular: Normal rate and regular rhythm  No murmur heard  Pulmonary/Chest: Breath sounds normal  He has no wheezes  He has no rales  He exhibits no tenderness  Abdominal: Soft  Bowel sounds are normal  He exhibits no distension  There is no tenderness  Musculoskeletal: Normal range of motion  He exhibits no edema  Skin: Skin is warm and dry  No rash noted  Psychiatric: He has a normal mood and affect  Vitals reviewed  Additional Data:   Labs:  Results from last 7 days   Lab Units 10/08/19  2159   WBC Thousand/uL 10 84*   HEMOGLOBIN g/dL 15 4   HEMATOCRIT % 46 7   PLATELETS Thousands/uL 255   NEUTROS PCT % 71   LYMPHS PCT % 19   MONOS PCT % 8   EOS PCT % 1     Results from last 7 days   Lab Units 10/08/19  2159   POTASSIUM mmol/L 4 2   CHLORIDE mmol/L 106   CO2 mmol/L 30   BUN mg/dL 23   CREATININE mg/dL 1 05   CALCIUM mg/dL 9 0           * I Have Reviewed All Lab Data Listed Above  * Additional Pertinent Lab Tests Reviewed:  All Labs Within Last 24 Hours Reviewed    Imaging:    Imaging Reports Reviewed Today Include: None new    Cultures:   Blood Culture: No results found for: BLOODCX  Urine Culture: No results found for: URINECX  Sputum Culture: No components found for: SPUTUMCX  Wound Culture: No results found for: WOUNDCULT    Last 24 Hours Medication List:     Current Facility-Administered Medications:  acetaminophen 650 mg Oral Q6H PRN Dipika Men, DO   amLODIPine 2 5 mg Oral Daily Dipika Men, DO   enoxaparin 40 mg Subcutaneous Daily Dipika Men, DO   lidocaine 1 patch Topical Daily Dipika Men, DO   methocarbamol 750 mg Oral Q6H [facility] Dipika Men, DO   tamsulosin 0 4 mg Oral Daily Dipika Men, DO   traZODone 50 mg Oral HS Dipika Men, DO   valACYclovir 500 mg Oral BID Dipika Men, DO        Today, Patient Was Seen By: Steve Montaño PA-C    ** Please Note: Dragon 360 Dictation voice to text software may have been used in the creation of this document   **

## 2019-10-09 NOTE — SOCIAL WORK
CM met with pt at bedside to discuss CM role in dcp  Pt reported that he lives in 2 story home, [address]  Pt independent with ADL's/ambulation PTA  Pt's contact is his brother, Candy Fuentes [phone]  Pt drives self  Pt reports no DME  Pt has no hx of HHC, STR  Pt denies IPMH, MH, drug/alcohol  Pt confirmed PCP and uses [facility]  CM reviewed d/c planning process including the following: identifying help at home, patient preference for d/c planning needs, Discharge Lounge, [facility] Meds to Bed program, availability of treatment team to discuss questions or concerns patient and/or family may have regarding understanding medications and recognizing signs and symptoms once discharged  CM also encouraged patient to follow up with all recommended appointments after discharge  Patient advised of importance for patient and family to participate in managing patients medical well being

## 2019-10-09 NOTE — ASSESSMENT & PLAN NOTE
· Presenting with 4-5 day history of back pain with right foot drop and subjective right leg weakness  · Does have history of similar and underwent L3-4 laminectomy 12/2018    Also has history of left foot drop undergoing surgery in [place] for this however persists with left foot drop  · Initially seen in the ED 10/07/2019 with this complaint and patient desiring outpatient workup, however patient presented again today to expedite workup and is agreeable to inpatient workup  · MRI lumbar and thoracic spine has been ordered, as has been neurosurgical consult  · Initiate neuro checks  · Continue Robaxin, add Lidoderm patch

## 2019-10-09 NOTE — H&P
H&P- Mali Hyman 1960, 60 y o  male MRN: [id number]    Unit/Bed#: -01 Encounter: [id number]    Primary Care Provider: ABNER Hillman   Date and time admitted to hospital: 10/8/2019  7:21 PM        * Right foot drop  Assessment & Plan  · Presenting with 4-5 day history of back pain with right foot drop and subjective right leg weakness  · Does have history of similar and underwent L3-4 laminectomy 12/2018  Also has history of left foot drop undergoing surgery in [place] for this however persists with left foot drop  · Initially seen in the ED 10/07/2019 with this complaint and patient desiring outpatient workup, however patient presented again today to expedite workup and is agreeable to inpatient workup  · MRI lumbar and thoracic spine has been ordered, as has been neurosurgical consult  A BMP is pending to check creatinine  · Initiate neuro checks  · Continue Robaxin, add Lidoderm patch    Hypertension  Assessment & Plan  · Continue with amlodipine, pain control as above    History of bladder cancer  Assessment & Plan  · Has been treated for this in the past   Outpatient urology follow-up as indicated    BPH  Assessment & Plan  · Stable and will continue Flomax      VTE Prophylaxis: Enoxaparin (Lovenox)  / sequential compression device   Code Status: Full code  POLST: POLST form is not discussed and not completed at this time  Anticipated Length of Stay:  Patient will be admitted on an Observation basis with an anticipated length of stay of  Less than 2 midnights  Justification for Hospital Stay: Please see detailed plans noted above      Chief Complaint:     RLE weakness  History of Present Illness:  Mali Hyman is a 60 y o  male who has a past medical history significant for chronic low back pain with chronic left foot drop since 2002, S/P L3-L4 discectomy 12/2018 in setting of right foot drop with persistence of this presenting with decreased strength of his right leg for 4-5 days     Patient admits he did some heavy lifting approximately 1 month ago with increased pain of the left lower back following this, but denied any fall or trauma at that time  Approximately 5 days prior to admission admitted to a fall without head strike or loss of consciousness when his right leg gave out    Following this admitted to persistent weakness of his right leg as well as some numbness in the area  He denied any fevers/chills, history of IVDU, urinary retention or incontinence, stool incontinence, or saddle anesthesia  He was seen and examined in the [facility] ED 10/07/2019 with these same symptoms, and advised to stay for further workup including MRIs of the thoracic and lumbar spines and neurosurgical consultation, however declined admission at that time and was agreeable to outpatient workup  He noted persistence of his symptoms, however, and returned to ED this evening now agreeable to workup as above and admission  During my evaluation, he notes persistence of above symptoms essentially unchanged from before      Review of Systems:    Constitutional:  Denies fever or chills   Eyes:  Denies change in visual acuity   HENT:  Denies nasal congestion or sore throat   Respiratory:  Denies cough or shortness of breath   Cardiovascular:  Denies chest pain or edema   GI:  Denies abdominal pain, nausea, vomiting, bloody stools or diarrhea   :  Denies dysuria   Musculoskeletal:  Denies joint pain but endorses back pain  Integument:  Denies rash   Neurologic:  Denies headache but endorses RLE weakness and foot drop and numbness in RLE  Endocrine:  Denies polyuria or polydipsia   Lymphatic:  Denies swollen glands   Psychiatric:  Denies depression or anxiety     Past Medical and Surgical History:   Past Medical History:   Diagnosis Date    Dry skin dermatitis     Dyspepsia     last assessed 09/27/17    Hypertension      Past Surgical History:   Procedure Laterality Date    BACK SURGERY Left     left with foot drop after surgery    BLADDER SURGERY      POSTERIOR LAMINECTOMY THORACIC AND LUMBAR SPINE Right 12/17/2018    Procedure: LAMINECTOMY LUMBAR LAMINECTOMY, DISCECTOMY, FORAMINOTOMY, L3-4, RIGHT;  Surgeon: Melissa Sorenson MD;  [facility];  Service: Neurosurgery       Meds/Allergies:  Medications Prior to Admission   Medication    amLODIPine (NORVASC) 2 5 mg tablet    methocarbamol (ROBAXIN) 750 mg tablet    tamsulosin (FLOMAX) 0 4 mg    traZODone (DESYREL) 50 mg tablet    valACYclovir (VALTREX) 500 mg tablet       Allergies: No Known Allergies  History:  Marital Status:      Substance Use History:   Social History     Substance and Sexual Activity   Alcohol Use Yes    Frequency: Monthly or less     Social History     Tobacco Use   Smoking Status Never Smoker   Smokeless Tobacco Never Used     Social History     Substance and Sexual Activity   Drug Use No       Family History:  Family History   Problem Relation Age of Onset    Stomach cancer Mother     Raynaud syndrome Mother     Heart attack Father     Heart attack Brother        Physical Exam:     Vitals:   Blood Pressure: (!) 163/108 (10/08/19 2221)  Pulse: 73 (10/08/19 2221)  Temperature: 98 3 °F (36 8 °C) (10/08/19 2221)  Temp Source: Oral (10/08/19 1800)  Respirations: 18 (10/08/19 2221)  Height: 6' 3" (190 5 cm) (10/08/19 1800)  Weight - Scale: 82 6 kg (182 lb 1 6 oz) (10/08/19 1800)  SpO2: 100 % (10/08/19 2221)    Constitutional:  Well developed, well nourished, no acute distress, non-toxic appearance   Eyes:  PERRL, conjunctiva normal   HENT:  Atraumatic, external ears normal, nose normal, oropharynx moist, no pharyngeal exudates   Neck- normal range of motion, no tenderness, supple   Respiratory:  No respiratory distress, normal breath sounds, no rales, no wheezing   Cardiovascular:  Normal rate, normal rhythm, no murmurs, no gallops, no rubs   GI:  Soft, nondistended, normal bowel sounds, nontender, no organomegaly, no mass, no rebound, no guarding   :  No costovertebral angle tenderness   Musculoskeletal:  No edema, no tenderness, no deformities  Back- no tenderness  Integument:  Well hydrated, no rash   Lymphatic:  No lymphadenopathy noted   Neurologic:  Alert &awake, communicative, CN 2-12 normal, normal motor function except right foot drop and persistent left foot drop, normal sensory function except patient noting decreased sensation to light touch to RLE primarily below knee as compared to LLE, no focal deficits noted   Psychiatric:  Speech and behavior appropriate       Lab Results: I have personally reviewed pertinent reports  Results from last 7 days   Lab Units 10/08/19  2159   WBC Thousand/uL 10 84*   HEMOGLOBIN g/dL 15 4   HEMATOCRIT % 46 7   PLATELETS Thousands/uL 255   NEUTROS PCT % 71   LYMPHS PCT % 19   MONOS PCT % 8   EOS PCT % 1           Invalid input(s): LABALBU            ** Please Note: Dragon 360 Dictation voice to text software was used in the creation of this document   **

## 2019-10-09 NOTE — OCCUPATIONAL THERAPY NOTE
OT CANCELLATION NOTE    OT orders received  Chart reviewed  Pt is pending MRI of lumbar and thoracic spine, as well as a neurosurg consult  Will hold OT eval at this time        TONY Crockett/MACARIO

## 2019-10-09 NOTE — ASSESSMENT & PLAN NOTE
· Presenting with 4-5 day history of back pain with right foot drop and subjective right leg weakness  · Does have history of similar and underwent L3-4 laminectomy 12/2018    Also has history of left foot drop undergoing surgery in [place] for this however persists with left foot drop  · Initially seen in the ED 10/07/2019 with this complaint and patient desiring outpatient workup, however patient presented again today to expedite workup and is agreeable to inpatient workup  · MRI lumbar and thoracic spine: pending  · Neurosurgical consult: pending  · Initiate neuro checks  · Continue Robaxin, add Lidoderm patch

## 2019-10-09 NOTE — ED PROVIDER NOTES
History  Chief Complaint   Patient presents with    Numbness     Pt has b/l drop feet, since last week he has been having R sided leg numbness  Patient is a 60year old male presenting for right leg weakness  Patient has history of bilateral drop foot, more recently patient underwent surgery with Dr Harriett Simon in December 2018 for his rightside  Patient reports about a month he was lifting heavy objects and felt pain in his back without change in symptoms  About 4 days ago patient fell down some steps at home, he states he felt as if his right leg gave out and caused him to fall  He feels weaker in his quad right leg since then and states he has seen atrophy over the last day to his right quad  He denies hitting his head or loss of consciousness associated with the fall, he has right low paraspinal lumbar pain and denies other injury from the fall  Patient is able to walk without assistance and uses braces on his lower extremities  He denies fevers, chills, recent illness, urinary/bowel incontinence or retention, IV drug use; he maintains genital sensation  Prior to Admission Medications   Prescriptions Last Dose Informant Patient Reported? Taking?    amLODIPine (NORVASC) 2 5 mg tablet  Self No No   Sig: Take 1 tablet (2 5 mg total) by mouth daily   methocarbamol (ROBAXIN) 750 mg tablet   No No   Sig: Take 1 tablet (750 mg total) by mouth every 6 (six) hours as needed for muscle spasms for up to 14 days   tamsulosin (FLOMAX) 0 4 mg  Self Yes No   Sig: Take 1 capsule by mouth daily   traZODone (DESYREL) 50 mg tablet   No No   Sig: Take 1 tablet (50 mg total) by mouth daily at bedtime   valACYclovir (VALTREX) 500 mg tablet  Self Yes No   Sig: Take 500 mg by mouth 2 (two) times a day      Facility-Administered Medications: None       Past Medical History:   Diagnosis Date    Dry skin dermatitis     Dyspepsia     last assessed 09/27/17    Hypertension        Past Surgical History:   Procedure Laterality Date    BACK SURGERY Left     left with foot drop after surgery    BLADDER SURGERY      POSTERIOR LAMINECTOMY THORACIC AND LUMBAR SPINE Right 12/17/2018    Procedure: LAMINECTOMY LUMBAR LAMINECTOMY, DISCECTOMY, FORAMINOTOMY, L3-4, RIGHT;  Surgeon: Jose Alberto Lo MD;  [facility];  Service: Neurosurgery       Family History   Problem Relation Age of Onset    Stomach cancer Mother     Raynaud syndrome Mother     Heart attack Father     Heart attack Brother      I have reviewed and agree with the history as documented  Social History     Tobacco Use    Smoking status: Never Smoker    Smokeless tobacco: Never Used   Substance Use Topics    Alcohol use: Yes     Frequency: Monthly or less    Drug use: No        Review of Systems   Constitutional: Negative for activity change, appetite change, fatigue and fever  Respiratory: Negative for chest tightness and shortness of breath  Cardiovascular: Negative for chest pain  Gastrointestinal: Negative for abdominal pain, constipation, diarrhea, nausea and vomiting  Genitourinary: Negative for difficulty urinating and dysuria  Musculoskeletal: Positive for back pain  Negative for gait problem, neck pain and neck stiffness  Neurological: Positive for weakness and numbness (chronic bilateral LE)  Negative for syncope, light-headedness and headaches  All other systems reviewed and are negative  Physical Exam  ED Triage Vitals [10/07/19 1501]   Temperature Pulse Respirations Blood Pressure SpO2   98 8 °F (37 1 °C) 76 20 (!) 162/107 100 %      Temp Source Heart Rate Source Patient Position - Orthostatic VS BP Location FiO2 (%)   Oral Monitor Sitting Left arm --      Pain Score       6             Orthostatic Vital Signs  Vitals:    10/07/19 1501   BP: (!) 162/107   Pulse: 76   Patient Position - Orthostatic VS: Sitting       Physical Exam   Constitutional: He is oriented to person, place, and time  He appears well-developed and well-nourished  No distress  Sitting in hospital bed, eventually walking around hallways   HENT:   Head: Normocephalic and atraumatic  Eyes: Pupils are equal, round, and reactive to light  Conjunctivae and EOM are normal  Right eye exhibits no discharge  Left eye exhibits no discharge  Cardiovascular: Normal rate, regular rhythm, normal heart sounds and intact distal pulses  DP 2+ bilateral, radial 2+ bilateral   Pulmonary/Chest: Effort normal and breath sounds normal  No stridor  No respiratory distress  He has no wheezes  He has no rales  Abdominal: Soft  He exhibits no distension  There is no tenderness  There is no rebound and no guarding  Musculoskeletal: He exhibits tenderness (No midline c/t/l spine tenderness; tenderness to right low lumbar paraspinal/hip area)  He exhibits no edema or deformity  UE 5/5 strength bilaterally; LE 5/5 hip flexion, patient able to dorsiflex with equal strength bilaterally, weak dorsiflexion bilaterally, able to move toes   Neurological: He is alert and oriented to person, place, and time  A sensory deficit (chronic decreased sensory to bilateral LE from knees down to toes) is present  No cranial nerve deficit  He exhibits normal muscle tone  Coordination normal    Skin: Skin is warm  He is not diaphoretic  No pallor  ED Medications  Medications - No data to display    Diagnostic Studies  Results Reviewed     None                 No orders to display         Procedures  Procedures        ED Course                               MDM  Number of Diagnoses or Management Options  Numbness:   Diagnosis management comments: Patient presents after fall 4 days ago, he has chronic bilateral foot drop and reports feeling of weakness in his right quad compared to usual  Neurosurgery contacted for recommendations of inpatient MRI vs outpatient imaging and follow-up, patient offered admission for MRI however declines  Neurosurgery office to call patient tomorrow regarding scheduling MRI  Disposition  Final diagnoses:   Numbness     Time reflects when diagnosis was documented in both MDM as applicable and the Disposition within this note     Time User Action Codes Description Comment    10/7/2019  5:12 PM Faheem Mueller Add [R20 0] Numbness       ED Disposition     ED Disposition Condition Date/Time Comment    Discharge Stable Mon Oct 7, 2019  5:11 PM Nisha Alford discharge to home/self care  Follow-up Information     Follow up With Specialties Details Why Contact Info    Sabina Diaz MD Neurosurgery Call  Your neurosurgery office should call you tomorrow to schedule an MRI, if you do not hear from them please call the office  [address] [place] [address]  [phone]            Discharge Medication List as of 10/7/2019  5:12 PM      CONTINUE these medications which have NOT CHANGED    Details   amLODIPine (NORVASC) 2 5 mg tablet Take 1 tablet (2 5 mg total) by mouth daily, Starting Fri 5/18/2018, Normal      methocarbamol (ROBAXIN) 750 mg tablet Take 1 tablet (750 mg total) by mouth every 6 (six) hours as needed for muscle spasms for up to 14 days, Starting Mon 9/16/2019, Until Mon 9/30/2019, Normal      tamsulosin (FLOMAX) 0 4 mg Take 1 capsule by mouth daily, Historical Med      traZODone (DESYREL) 50 mg tablet Take 1 tablet (50 mg total) by mouth daily at bedtime, Starting Mon 9/16/2019, Normal      valACYclovir (VALTREX) 500 mg tablet Take 500 mg by mouth 2 (two) times a day, Starting Sat 2/3/2018, Historical Med           No discharge procedures on file  ED Provider  Attending physically available and evaluated Nisha Alford  I managed the patient along with the ED Attending      Electronically Signed by         Gilberto Washington DO  10/08/19 3254

## 2019-10-09 NOTE — PLAN OF CARE
Problem: Nutrition/Hydration-ADULT  Goal: Nutrient/Hydration intake appropriate for improving, restoring or maintaining nutritional needs  Description  Monitor and assess patient's nutrition/hydration status for malnutrition  Collaborate with interdisciplinary team and initiate plan and interventions as ordered  Monitor patient's weight and dietary intake as ordered or per policy  Utilize nutrition screening tool and intervene as necessary  Determine patient's food preferences and provide high-protein, high-caloric foods as appropriate       INTERVENTIONS:  - Monitor oral intake, urinary output, labs, and treatment plans  - Assess nutrition and hydration status and recommend course of action  - Evaluate amount of meals eaten  - Assist patient with eating if necessary   - Allow adequate time for meals  - Recommend/ encourage appropriate diets, oral nutritional supplements, and vitamin/mineral supplements  - Order, calculate, and assess calorie counts as needed  - Recommend, monitor, and adjust tube feedings and TPN/PPN based on assessed needs  - Assess need for intravenous fluids  - Provide specific nutrition/hydration education as appropriate  - Include patient/family/caregiver in decisions related to nutrition  Outcome: Progressing     Problem: PAIN - ADULT  Goal: Verbalizes/displays adequate comfort level or baseline comfort level  Description  Interventions:  - Encourage patient to monitor pain and request assistance  - Assess pain using appropriate pain scale  - Administer analgesics based on type and severity of pain and evaluate response  - Implement non-pharmacological measures as appropriate and evaluate response  - Consider cultural and social influences on pain and pain management  - Notify physician/advanced practitioner if interventions unsuccessful or patient reports new pain  Outcome: Progressing     Problem: SAFETY ADULT  Goal: Patient will remain free of falls  Description  INTERVENTIONS:  - Assess patient frequently for physical needs  -  Identify cognitive and physical deficits and behaviors that affect risk of falls    -  [place] fall precautions as indicated by assessment   - Educate patient/family on patient safety including physical limitations  - Instruct patient to call for assistance with activity based on assessment  - Modify environment to reduce risk of injury  - Consider OT/PT consult to assist with strengthening/mobility  Outcome: Progressing  Goal: Maintain or return to baseline ADL function  Description  INTERVENTIONS:  -  Assess patient's ability to carry out ADLs; assess patient's baseline for ADL function and identify physical deficits which impact ability to perform ADLs (bathing, care of mouth/teeth, toileting, grooming, dressing, etc )  - Assess/evaluate cause of self-care deficits   - Assess range of motion  - Assess patient's mobility; develop plan if impaired  - Assess patient's need for assistive devices and provide as appropriate  - Encourage maximum independence but intervene and supervise when necessary  - Involve family in performance of ADLs  - Assess for home care needs following discharge   - Consider OT consult to assist with ADL evaluation and planning for discharge  - Provide patient education as appropriate  Outcome: Progressing  Goal: Maintain or return mobility status to optimal level  Description  INTERVENTIONS:  - Assess patient's baseline mobility status (ambulation, transfers, stairs, etc )    - Identify cognitive and physical deficits and behaviors that affect mobility  - Identify mobility aids required to assist with transfers and/or ambulation (gait belt, sit-to-stand, lift, walker, cane, etc )  - [place] fall precautions as indicated by assessment  - Record patient progress and toleration of activity level on Mobility SBAR; progress patient to next Phase/Stage  - Instruct patient to call for assistance with activity based on assessment  - Consider rehabilitation consult to assist with strengthening/weightbearing, etc   Outcome: Progressing     Problem: DISCHARGE PLANNING  Goal: Discharge to home or other facility with appropriate resources  Description  INTERVENTIONS:  - Identify barriers to discharge w/patient and caregiver  - Arrange for needed discharge resources and transportation as appropriate  - Identify discharge learning needs (meds, wound care, etc )  - Arrange for interpretive services to assist at discharge as needed  - Refer to Case Management Department for coordinating discharge planning if the patient needs post-hospital services based on physician/advanced practitioner order or complex needs related to functional status, cognitive ability, or social support system  Outcome: Progressing     Problem: Knowledge Deficit  Goal: Patient/family/caregiver demonstrates understanding of disease process, treatment plan, medications, and discharge instructions  Description  Complete learning assessment and assess knowledge base    Interventions:  - Provide teaching at level of understanding  - Provide teaching via preferred learning methods  Outcome: Progressing

## 2019-10-09 NOTE — PLAN OF CARE
Problem: Nutrition/Hydration-ADULT  Goal: Nutrient/Hydration intake appropriate for improving, restoring or maintaining nutritional needs  Description  Monitor and assess patient's nutrition/hydration status for malnutrition  Collaborate with interdisciplinary team and initiate plan and interventions as ordered  Monitor patient's weight and dietary intake as ordered or per policy  Utilize nutrition screening tool and intervene as necessary  Determine patient's food preferences and provide high-protein, high-caloric foods as appropriate       INTERVENTIONS:  - Monitor oral intake, urinary output, labs, and treatment plans  - Assess nutrition and hydration status and recommend course of action  - Evaluate amount of meals eaten  - Assist patient with eating if necessary   - Allow adequate time for meals  - Recommend/ encourage appropriate diets, oral nutritional supplements, and vitamin/mineral supplements  - Order, calculate, and assess calorie counts as needed  - Recommend, monitor, and adjust tube feedings and TPN/PPN based on assessed needs  - Assess need for intravenous fluids  - Provide specific nutrition/hydration education as appropriate  - Include patient/family/caregiver in decisions related to nutrition  Outcome: Progressing     Problem: PAIN - ADULT  Goal: Verbalizes/displays adequate comfort level or baseline comfort level  Description  Interventions:  - Encourage patient to monitor pain and request assistance  - Assess pain using appropriate pain scale  - Administer analgesics based on type and severity of pain and evaluate response  - Implement non-pharmacological measures as appropriate and evaluate response  - Consider cultural and social influences on pain and pain management  - Notify physician/advanced practitioner if interventions unsuccessful or patient reports new pain  Outcome: Progressing     Problem: SAFETY ADULT  Goal: Patient will remain free of falls  Description  INTERVENTIONS:  - Assess patient frequently for physical needs  -  Identify cognitive and physical deficits and behaviors that affect risk of falls    -  Bly fall precautions as indicated by assessment   - Educate patient/family on patient safety including physical limitations  - Instruct patient to call for assistance with activity based on assessment  - Modify environment to reduce risk of injury  - Consider OT/PT consult to assist with strengthening/mobility  Outcome: Progressing  Goal: Maintain or return to baseline ADL function  Description  INTERVENTIONS:  -  Assess patient's ability to carry out ADLs; assess patient's baseline for ADL function and identify physical deficits which impact ability to perform ADLs (bathing, care of mouth/teeth, toileting, grooming, dressing, etc )  - Assess/evaluate cause of self-care deficits   - Assess range of motion  - Assess patient's mobility; develop plan if impaired  - Assess patient's need for assistive devices and provide as appropriate  - Encourage maximum independence but intervene and supervise when necessary  - Involve family in performance of ADLs  - Assess for home care needs following discharge   - Consider OT consult to assist with ADL evaluation and planning for discharge  - Provide patient education as appropriate  Outcome: Progressing  Goal: Maintain or return mobility status to optimal level  Description  INTERVENTIONS:  - Assess patient's baseline mobility status (ambulation, transfers, stairs, etc )    - Identify cognitive and physical deficits and behaviors that affect mobility  - Identify mobility aids required to assist with transfers and/or ambulation (gait belt, sit-to-stand, lift, walker, cane, etc )  - Bly fall precautions as indicated by assessment  - Record patient progress and toleration of activity level on Mobility SBAR; progress patient to next Phase/Stage  - Instruct patient to call for assistance with activity based on assessment  - Consider rehabilitation consult to assist with strengthening/weightbearing, etc   Outcome: Progressing     Problem: DISCHARGE PLANNING  Goal: Discharge to home or other facility with appropriate resources  Description  INTERVENTIONS:  - Identify barriers to discharge w/patient and caregiver  - Arrange for needed discharge resources and transportation as appropriate  - Identify discharge learning needs (meds, wound care, etc )  - Arrange for interpretive services to assist at discharge as needed  - Refer to Case Management Department for coordinating discharge planning if the patient needs post-hospital services based on physician/advanced practitioner order or complex needs related to functional status, cognitive ability, or social support system  Outcome: Progressing     Problem: Knowledge Deficit  Goal: Patient/family/caregiver demonstrates understanding of disease process, treatment plan, medications, and discharge instructions  Description  Complete learning assessment and assess knowledge base    Interventions:  - Provide teaching at level of understanding  - Provide teaching via preferred learning methods  Outcome: Progressing

## 2019-10-10 VITALS
WEIGHT: 182.1 LBS | DIASTOLIC BLOOD PRESSURE: 95 MMHG | SYSTOLIC BLOOD PRESSURE: 157 MMHG | HEIGHT: 75 IN | HEART RATE: 68 BPM | RESPIRATION RATE: 19 BRPM | BODY MASS INDEX: 22.64 KG/M2 | TEMPERATURE: 97.8 F | OXYGEN SATURATION: 90 %

## 2019-10-10 PROCEDURE — 99217 PR OBSERVATION CARE DISCHARGE MANAGEMENT: CPT | Performed by: PHYSICIAN ASSISTANT

## 2019-10-10 PROCEDURE — 99213 OFFICE O/P EST LOW 20 MIN: CPT | Performed by: NEUROLOGICAL SURGERY

## 2019-10-10 RX ORDER — METHYLPREDNISOLONE 4 MG/1
TABLET ORAL
Qty: 1 EACH | Refills: 0 | Status: SHIPPED | OUTPATIENT
Start: 2019-10-10 | End: 2019-10-28 | Stop reason: ALTCHOICE

## 2019-10-10 RX ADMIN — VALACYCLOVIR HYDROCHLORIDE 500 MG: 500 TABLET, FILM COATED ORAL at 08:41

## 2019-10-10 RX ADMIN — LIDOCAINE 1 PATCH: 50 PATCH CUTANEOUS at 08:41

## 2019-10-10 RX ADMIN — METHOCARBAMOL 750 MG: 750 TABLET, FILM COATED ORAL at 12:18

## 2019-10-10 RX ADMIN — ENOXAPARIN SODIUM 40 MG: 40 INJECTION SUBCUTANEOUS at 08:41

## 2019-10-10 RX ADMIN — AMLODIPINE BESYLATE 2.5 MG: 2.5 TABLET ORAL at 08:40

## 2019-10-10 NOTE — DISCHARGE SUMMARY
[facility]  Discharge- Ramon Common 1960, 60 y o  male MRN: [id number]  Unit/Bed#: -01 Encounter: [id number]  Primary Care Provider: ABNER Gonzales   Date and time admitted to hospital: 10/8/2019  7:21 PM    * Right foot drop  Assessment & Plan  · Presenting with 4-5 day history of back pain with right foot drop and subjective right leg weakness  · Does have history of similar and underwent L3-4 laminectomy 12/2018  Also has history of left foot drop undergoing surgery in [place] for this however persists with left foot drop  · Initially seen in the ED 10/07/2019 with this complaint and patient desiring outpatient workup, however patient presented again today to expedite workup and is agreeable to inpatient workup  · MRI lumbar and thoracic spine: New right L2-3 paracentral disc extrusion with inferior migration into the lateral recess impinging on the traversing right L3 nerve root  New shallow right central disc herniation with mild right ventral thecal sac indentation at L1-L2  · Neurosurgical consult:  Pursue surgical intervention in November, will discharge on Medrol Dosepak, with prescription for new orthotics for footdrop  · Initiate neuro checks  · Continue Robaxin, add Lidoderm patch    History of bladder cancer  Assessment & Plan  · Has been treated for this in the past   Outpatient urology follow-up as indicated    BPH  Assessment & Plan  · Stable and will continue Flomax    Essential hypertension  Assessment & Plan  · Fairly stable on amlodipine        Discharging Physician / Practitioner: Iveth Fleming PA-C  PCP: Stefanie Gonzales  Admission Date:   Admission Orders (From admission, onward)     Ordered        10/08/19 2143  Place in Observation (expected length of stay for this patient is less than two midnights)  Once                   Discharge Date: 10/10/19    Resolved Problems  Date Reviewed: 10/10/2019    None          Consultations During [facility]:  · Neurosurgery    Procedures Performed:   · None    Significant Findings / Test Results:   · MRI lumbar spine:     New right L2-3 paracentral disc extrusion with inferior migration into the lateral recess impinging on the traversing right L3 nerve root  Surgical consultation recommended      New shallow right central disc herniation with mild right ventral thecal sac indentation at L1-L2      Interval discectomy of the previously noted right L3-4 disc extrusion      Stable central to left central disc herniation at L3-L4 with left subarticular recess encroachment, correlate for chronic left L4 radiculopathy  Chronic bilateral L4-5 and L5-S1 foraminal stenosis        Postoperative changes of right L3-4 and left L4-5 and L5-S1 laminotomy      Stable grade 1 anterolisthesis of L5 on S1 with chronic bilateral L5 spondylolysis  Incidental Findings:   · None    Test Results Pending at Discharge (will require follow up): · None     Outpatient Tests Requested:  · None    Complications:  None    Reason for Admission:  Worsening back pain    Hospital Course:     Loretta Muñoz is a 60 y o  male patient with a history of chronic low back pain and chronic left foot drop status post L3-L4 discectomy who originally presented to the hospital on 10/8/2019 due to right lower extremity weakness  Patient admits to heavy lifting approximately 1 month ago which caused increased pain to the left lower back  He also admits to fall 5 days prior to admission and his right leg giving out  Was recently evaluated on 10/07 with similar symptoms and was advised to stay for further workup  He did declined admission at that time was agreeable to outpatient workup  However, patient has noticed persistent sinus symptoms and therefore returned and is now agreeable to workup  Imaging performed neurosurgery consulted, imaging results as above    No inpatient neurosurgical intervention at this time patient to follow up for outpatient surgical procedure, likely in November  Patient to be discharged on Medrol Dosepak to assist in alleviating symptoms at this time  Otherwise medically stable  Please see above list of diagnoses and related plan for additional information  Condition at Discharge: stable     Discharge Day Visit / Exam:     * Please refer to separate progress note for these details *    Discussion with Family:  Discussed discharge plan with patient at bedside  Answered all questions to the best of my ability  Confirmed discharge plans with Neurosurgery  Discharge instructions/Information to patient and family:   See after visit summary for information provided to patient and family  Provisions for Follow-Up Care:  See after visit summary for information related to follow-up care and any pertinent home health orders  Disposition:     Home    For Discharges to [facility] SNF:   · Not Applicable to this Patient - Not Applicable to this Patient    Planned Readmission: None, likely admission following surgical intervention for herniation  Discharge Statement:  I spent 65 minutes discharging the patient  This time was spent on the day of discharge  I had direct contact with the patient on the day of discharge  Greater than 50% of the total time was spent examining patient, answering all patient questions, arranging and discussing plan of care with patient as well as directly providing post-discharge instructions  Additional time then spent on discharge activities  Discharge Medications:  See after visit summary for reconciled discharge medications provided to patient and family        ** Please Note: This note has been constructed using a voice recognition system **

## 2019-10-10 NOTE — UTILIZATION REVIEW
Continued Stay Review    Date: 10/10/10 Thursday                         Current Patient Class: OBSERVATION   Current Level of Care: MED SURG    HPI:   60year old male with PMHX  Lumbar laminectomy and diskectomy 1 year ago  Initially presented to [facility] ED from home 2nd right lower extremity weakness  **HAD BEEN EVALUATED in ED 1 day prior with recommendation for Neuro surgery consult and MRI BUT  opted for discharge   RETURNED TO [facility] ED TODAY 2nd the RLE Weakness - for the neurosurgery consult and MRI  PLACED IN OBSERVATION 10/8/19 AT 2143 ation for right lower extremity  [address] neurosurgery, initiate neuro checks, MRI Thoracic + Lumbar Spine, PT + OT Evaluations        ASSESSMENT/ PLAN PER TODAY'S INT MED NOTE:  * Right foot drop  Assessment & Plan  · Presenting with 4-5 day history of back pain with right foot drop and subjective right leg weakness  · Does have history of similar and underwent L3-4 laminectomy 12/2018  Also has history of left foot drop undergoing surgery in [place] for this however persists with left foot drop  · Initially seen in the ED 10/07/2019 with this complaint and patient desiring outpatient workup, however patient presented again today to expedite workup and is agreeable to inpatient workup  · MRI lumbar and thoracic spine: New right L2-3 paracentral disc extrusion with inferior migration into the lateral recess impinging on the traversing right L3 nerve root  New shallow right central disc herniation with mild right ventral thecal sac indentation at L1-L2    · Neurosurgical consult: to reevaluate now that imaging has resulted  · Initiate neuro checks  · Continue Robaxin, add Lidoderm patch     VTE Pharmacologic Prophylaxis:   Pharmacologic: Enoxaparin (Lovenox)  Mechanical VTE Prophylaxis in Place: No     Certification Statement: The patient will continue to require additional inpatient hospital stay    Pertinent Labs/Diagnostic Results:   Results from last 7 days Lab Units 10/08/19  2159   WBC Thousand/uL 10 84*   HEMOGLOBIN g/dL 15 4   HEMATOCRIT % 46 7   PLATELETS Thousands/uL 255   NEUTROS ABS Thousands/µL 7 69*     Results from last 7 days   Lab Units 10/08/19  2159   SODIUM mmol/L 142   POTASSIUM mmol/L 4 2   CHLORIDE mmol/L 106   CO2 mmol/L 30   ANION GAP mmol/L 6   BUN mg/dL 23   CREATININE mg/dL 1 05   EGFR ml/min/1 73sq m 77   CALCIUM mg/dL 9 0     Results from last 7 days   Lab Units 10/08/19  2159   GLUCOSE RANDOM mg/dL 82     MRI THORACIC SPINE WITH AND WITHOUT CONTRAST -  Lower cervical and multilevel thoracic degenerative discogenic disease as described above   The notable disc herniations are noted on the left at T1-T2, on the left at T8-T9, and on the right at T10-T11   There is ventral indentation on the thecal sac and mild flattening of the ventral cord margin without cord signal abnormality     Additional smaller disc herniations without spinal canal or foraminal stenosis as described above  No cord signal abnormality or pathologic enhancement  MRI LUMBAR SPINE WITH AND WITHOUT CONTRAST -  New right L2-3 paracentral disc extrusion with inferior migration into the lateral recess impinging on the traversing right L3 nerve root   Surgical consultation recommended  New shallow right central disc herniation with mild right ventral thecal sac indentation at L1-L2  Interval discectomy of the previously noted right L3-4 disc extrusion  Stable central to left central disc herniation at L3-L4 with left subarticular recess encroachment, correlate for chronic left L4 radiculopathy  Chronic bilateral L4-5 and L5-S1 foraminal stenosis     Postoperative changes of right L3-4 and left L4-5 and L5-S1 laminotomy  Stable grade 1 anterolisthesis of L5 on S1 with chronic bilateral L5 spondylolysis        Vital Signs:  Temp (24hrs), Av 1 °F (36 7 °C), Min:97 8 °F (36 6 °C), Max:98 7 °F (37 1 °C)   Temp:  [97 8 °F (36 6 °C)-98 7 °F (37 1 °C)] 97 8 °F (36 6 °C)  HR: [68] 68  Resp:  [16-20] 19  BP: (137-157)/(87-95) 157/95  SpO2:  [90 %] 90 %  Body mass index is 22 76 kg/m²       Input and Output Summary (last 24 hours) 10/10/2019 0936:  Gross per 24 hour   Intake 1370 ml   Output 600 ml   Net 770 ml     REGULAR HOUSE DIET    Medications:   amLODIPine 2 5 mg Oral Daily   enoxaparin 40 mg Subcutaneous Daily   lidocaine 1 patch Topical Daily   methocarbamol 750 mg Oral Q6H CHEY   tamsulosin 0 8 mg Oral Daily With Dinner   traZODone 50 mg Oral HS   valACYclovir 500 mg Oral BID      acetaminophen 650 mg Oral Q6H PRN  GIVEN X 1/ 24 hRS     Discharge Plan:   TO BE DETERMINED - PENDING TX PLAN  CASE MANAGEMENT FOLLOWING CLOSELY FOR ALL DISCHARGE NEEDS    Network Utilization Review Department  Phone: [phone]; Fax [phone]  [email or web address]  ATTENTION: Please call with any questions or concerns to [phone]  and carefully listen to the prompts so that you are directed to the right person  Send all requests for admission clinical reviews, approved or denied determinations and any other requests to fax [phone]   All voicemails are confidential

## 2019-10-10 NOTE — PROGRESS NOTES
Elder Chambers, [facility] from the ED, brought the missing shoes and leg brace to [facility] and I brought them to the patient's room  Patient was made aware that the shoes and leg brace have been located

## 2019-10-10 NOTE — PROGRESS NOTES
[facility] Internal Medicine  Progress Note - Johnathan Hernandez 1960, 60 y o  male MRN: [id number]  Unit/Bed#: -01 Encounter: [id number]  Primary Care Provider: ABNER Gonzales   Date and time admitted to hospital: 10/8/2019  7:21 PM    * Right foot drop  Assessment & Plan  · Presenting with 4-5 day history of back pain with right foot drop and subjective right leg weakness  · Does have history of similar and underwent L3-4 laminectomy 12/2018  Also has history of left foot drop undergoing surgery in [place] for this however persists with left foot drop  · Initially seen in the ED 10/07/2019 with this complaint and patient desiring outpatient workup, however patient presented again today to expedite workup and is agreeable to inpatient workup  · MRI lumbar and thoracic spine: New right L2-3 paracentral disc extrusion with inferior migration into the lateral recess impinging on the traversing right L3 nerve root  New shallow right central disc herniation with mild right ventral thecal sac indentation at L1-L2  · Neurosurgical consult: to reevaluate now that imaging has resulted  · Initiate neuro checks  · Continue Robaxin, add Lidoderm patch    History of bladder cancer  Assessment & Plan  · Has been treated for this in the past   Outpatient urology follow-up as indicated    BPH  Assessment & Plan  · Stable and will continue Flomax    Essential hypertension  Assessment & Plan  · Fairly stable on amlodipine  VTE Pharmacologic Prophylaxis:   Pharmacologic: Enoxaparin (Lovenox)  Mechanical VTE Prophylaxis in Place: No    Patient Centered Rounds: I have performed bedside rounds with nursing staff today  Discussions with Specialists or Other Care Team Provider:  Discussed with Neuro surgical team, reviewed findings of MRI, will await decision in regards to surgical intervention  Education and Discussions with Family / Patient:  Discussed care plan with patient at bedside    Answered all questions to the best of my ability  Time Spent for Care: 30 minutes  More than 50% of total time spent on counseling and coordination of care as described above  Current Length of Stay: 0 day(s)    Current Patient Status: Observation   Certification Statement: The patient will continue to require additional inpatient hospital stay due to Pending further neurosurgical evaluation, possible surgical intervention    Discharge Plan:  Pending decision from neuro surgical team, if surgical intervention is warranted, patient will have extended hospital stay, patient is more appropriate for outpatient treatment, plan to discharge, patient will likely need rehab given gait disturbance from foot drop  Code Status: Level 1 - Full Code      Subjective:   Patient reports he has had dropped foot for a while, his right leg has atrophied and he has odd sensations  He is distracted by his orthotic inserts/use missing, his recent relationship struggles and breaking his phone  Discussed that nurse manager, multiple people were looking to locate his shoes  Provided the number for [employer], instruct patient on how to use room phone, listened well patient described interactions with his ex  Patient required significant redirection  Objective:     Vitals:   Temp (24hrs), Av 1 °F (36 7 °C), Min:97 8 °F (36 6 °C), Max:98 7 °F (37 1 °C)    Temp:  [97 8 °F (36 6 °C)-98 7 °F (37 1 °C)] 97 8 °F (36 6 °C)  HR:  [68] 68  Resp:  [16-20] 19  BP: (137-157)/(87-95) 157/95  SpO2:  [90 %] 90 %  Body mass index is 22 76 kg/m²  Input and Output Summary (last 24 hours): Intake/Output Summary (Last 24 hours) at 10/10/2019 0936  Last data filed at 10/10/2019 0101  Gross per 24 hour   Intake 1370 ml   Output 600 ml   Net 770 ml       Physical Exam:     Physical Exam   Constitutional: He appears well-developed and well-nourished  No distress  HENT:   Head: Normocephalic and atraumatic     Eyes: Conjunctivae are normal  No scleral icterus  Cardiovascular: Normal rate and regular rhythm  No murmur heard  Pulmonary/Chest: Effort normal and breath sounds normal  No respiratory distress  He has no wheezes  He has no rales  Abdominal: Soft  He exhibits no distension  There is no tenderness  Musculoskeletal: He exhibits no edema  Neurological: He is alert  He displays atrophy (RLE)  A sensory deficit (RLE) is present  Skin: Skin is warm and dry  No erythema  Psychiatric: He has a normal mood and affect  Nursing note and vitals reviewed  Additional Data:     Labs:    Results from last 7 days   Lab Units 10/08/19  2159   WBC Thousand/uL 10 84*   HEMOGLOBIN g/dL 15 4   HEMATOCRIT % 46 7   PLATELETS Thousands/uL 255   NEUTROS PCT % 71   LYMPHS PCT % 19   MONOS PCT % 8   EOS PCT % 1     Results from last 7 days   Lab Units 10/08/19  2159   SODIUM mmol/L 142   POTASSIUM mmol/L 4 2   CHLORIDE mmol/L 106   CO2 mmol/L 30   BUN mg/dL 23   CREATININE mg/dL 1 05   ANION GAP mmol/L 6   CALCIUM mg/dL 9 0   GLUCOSE RANDOM mg/dL 82                           * I Have Reviewed All Lab Data Listed Above  * Additional Pertinent Lab Tests Reviewed: All Labs Within Last 24 Hours Reviewed    Imaging:    Imaging Reports Reviewed Today Include:   · MRI lumbar spine 10/9:  New right L2-3 paracentral disc extrusion with inferior migration into the lateral recess impinging on the traversing right L3 nerve root  Surgical consultation recommended      New shallow right central disc herniation with mild right ventral thecal sac indentation at L1-L2      Interval discectomy of the previously noted right L3-4 disc extrusion      Stable central to left central disc herniation at L3-L4 with left subarticular recess encroachment, correlate for chronic left L4 radiculopathy   Chronic bilateral L4-5 and L5-S1 foraminal stenosis        Postoperative changes of right L3-4 and left L4-5 and L5-S1 laminotomy      Stable grade 1 anterolisthesis of L5 on S1 with chronic bilateral L5 spondylolysis      · MRI thoracic spine 10/9:  Lower cervical and multilevel thoracic degenerative discogenic disease as described above  The notable disc herniations are noted on the left at T1-T2, on the left at T8-T9, and on the right at T10-T11  There is ventral indentation on the thecal sac   and mild flattening of the ventral cord margin without cord signal abnormality        Additional smaller disc herniations without spinal canal or foraminal stenosis as described above      No cord signal abnormality or pathologic enhancement  Imaging Personally Reviewed by Myself Includes:  None    Recent Cultures (last 7 days):           Last 24 Hours Medication List:     Current Facility-Administered Medications:  acetaminophen 650 mg Oral Q6H PRN Vadim Ledesma, DO   amLODIPine 2 5 mg Oral Daily Vadim Ledesma, DO   enoxaparin 40 mg Subcutaneous Daily Vadim Ledesma, DO   lidocaine 1 patch Topical Daily Vadim Goodwind, DO   methocarbamol 750 mg Oral Q6H [address] Vadim Goodwind, DO   tamsulosin 0 8 mg Oral Daily With Textbenny Lilly PA-C   traZODone 50 mg Oral HS Vadim Ledesma, DO   valACYclovir 500 mg Oral BID Vadim Ledesma, DO        Today, Patient Was Seen By: Catrachito Mai PA-C    ** Please Note: Dictation voice to text software may have been used in the creation of this document   **

## 2019-10-10 NOTE — PLAN OF CARE
Problem: Nutrition/Hydration-ADULT  Goal: Nutrient/Hydration intake appropriate for improving, restoring or maintaining nutritional needs  Description  Monitor and assess patient's nutrition/hydration status for malnutrition  Collaborate with interdisciplinary team and initiate plan and interventions as ordered  Monitor patient's weight and dietary intake as ordered or per policy  Utilize nutrition screening tool and intervene as necessary  Determine patient's food preferences and provide high-protein, high-caloric foods as appropriate       INTERVENTIONS:  - Monitor oral intake, urinary output, labs, and treatment plans  - Assess nutrition and hydration status and recommend course of action  - Evaluate amount of meals eaten  - Assist patient with eating if necessary   - Allow adequate time for meals  - Recommend/ encourage appropriate diets, oral nutritional supplements, and vitamin/mineral supplements  - Order, calculate, and assess calorie counts as needed  - Recommend, monitor, and adjust tube feedings and TPN/PPN based on assessed needs  - Assess need for intravenous fluids  - Provide specific nutrition/hydration education as appropriate  - Include patient/family/caregiver in decisions related to nutrition  10/10/2019 1448 by Stephanie Molina RN  Outcome: Completed  10/10/2019 0737 by Stephanie Molina RN  Outcome: Progressing     Problem: PAIN - ADULT  Goal: Verbalizes/displays adequate comfort level or baseline comfort level  Description  Interventions:  - Encourage patient to monitor pain and request assistance  - Assess pain using appropriate pain scale  - Administer analgesics based on type and severity of pain and evaluate response  - Implement non-pharmacological measures as appropriate and evaluate response  - Consider cultural and social influences on pain and pain management  - Notify physician/advanced practitioner if interventions unsuccessful or patient reports new pain  10/10/2019 1448 by Radha Riddle Anamika Dorman RN  Outcome: Completed  10/10/2019 0737 by Ponce Chew RN  Outcome: Progressing     Problem: SAFETY ADULT  Goal: Patient will remain free of falls  Description  INTERVENTIONS:  - Assess patient frequently for physical needs  -  Identify cognitive and physical deficits and behaviors that affect risk of falls    -  [place] fall precautions as indicated by assessment   - Educate patient/family on patient safety including physical limitations  - Instruct patient to call for assistance with activity based on assessment  - Modify environment to reduce risk of injury  - Consider OT/PT consult to assist with strengthening/mobility  10/10/2019 1448 by Ponce Chew RN  Outcome: Completed  10/10/2019 0737 by Ponce Chew RN  Outcome: Progressing  Goal: Maintain or return to baseline ADL function  Description  INTERVENTIONS:  -  Assess patient's ability to carry out ADLs; assess patient's baseline for ADL function and identify physical deficits which impact ability to perform ADLs (bathing, care of mouth/teeth, toileting, grooming, dressing, etc )  - Assess/evaluate cause of self-care deficits   - Assess range of motion  - Assess patient's mobility; develop plan if impaired  - Assess patient's need for assistive devices and provide as appropriate  - Encourage maximum independence but intervene and supervise when necessary  - Involve family in performance of ADLs  - Assess for home care needs following discharge   - Consider OT consult to assist with ADL evaluation and planning for discharge  - Provide patient education as appropriate  10/10/2019 1448 by Ponce Chew RN  Outcome: Completed  10/10/2019 0737 by Ponce Chew RN  Outcome: Progressing  Goal: Maintain or return mobility status to optimal level  Description  INTERVENTIONS:  - Assess patient's baseline mobility status (ambulation, transfers, stairs, etc )    - Identify cognitive and physical deficits and behaviors that affect mobility  - Identify mobility aids required to assist with transfers and/or ambulation (gait belt, sit-to-stand, lift, walker, cane, etc )  - [place] fall precautions as indicated by assessment  - Record patient progress and toleration of activity level on Mobility SBAR; progress patient to next Phase/Stage  - Instruct patient to call for assistance with activity based on assessment  - Consider rehabilitation consult to assist with strengthening/weightbearing, etc   10/10/2019 1448 by Millie Mahoney RN  Outcome: Completed  10/10/2019 0737 by Millie Mahoney RN  Outcome: Progressing     Problem: DISCHARGE PLANNING  Goal: Discharge to home or other facility with appropriate resources  Description  INTERVENTIONS:  - Identify barriers to discharge w/patient and caregiver  - Arrange for needed discharge resources and transportation as appropriate  - Identify discharge learning needs (meds, wound care, etc )  - Arrange for interpretive services to assist at discharge as needed  - Refer to Case Management Department for coordinating discharge planning if the patient needs post-hospital services based on physician/advanced practitioner order or complex needs related to functional status, cognitive ability, or social support system  10/10/2019 1448 by Millie Mahoney RN  Outcome: Completed  10/10/2019 0737 by Millie Mahoney RN  Outcome: Progressing     Problem: Knowledge Deficit  Goal: Patient/family/caregiver demonstrates understanding of disease process, treatment plan, medications, and discharge instructions  Description  Complete learning assessment and assess knowledge base    Interventions:  - Provide teaching at level of understanding  - Provide teaching via preferred learning methods  10/10/2019 1448 by Millie Mahoney RN  Outcome: Completed  10/10/2019 0737 by Millie Mahoney RN  Outcome: Progressing     Problem: Potential for Falls  Goal: Patient will remain free of falls  Description  INTERVENTIONS:  - Assess patient frequently for physical needs  -  Identify cognitive and physical deficits and behaviors that affect risk of falls    -  [place] fall precautions as indicated by assessment   - Educate patient/family on patient safety including physical limitations  - Instruct patient to call for assistance with activity based on assessment  - Modify environment to reduce risk of injury  - Consider OT/PT consult to assist with strengthening/mobility  10/10/2019 1448 by Yuriy Banerjee RN  Outcome: Completed  10/10/2019 0737 by Yuriy Banerjee RN  Outcome: Progressing

## 2019-10-10 NOTE — ASSESSMENT & PLAN NOTE
· Patient complaining of RLE numbness and weakness after lifting heavy object about 1 month ago and subsequently falling down the stairs over the weekend due to right leg buckling  · S/p LAMINECTOMY LUMBAR LAMINECTOMY, DISCECTOMY, FORAMINOTOMY, L3-4, RIGHT in 12/2018 for right foot drop which is persistent  · Was seen in the ED on 10/7 and discharged but presented again to expedite workup    Imaging reviewed personally with attending, results are as follows:  · MRI lumbar spine without with contrast 10/09/2019:  New right L2-3 paracentral disc extrusion with inferior migration into the lateral recess impinging on the traversing right L3 nerve root  New shallow right central disc herniation with mild right ventral thecal sac indentation at L2-3  Stable postoperative changes noted  · MRI thoracic spine with and without contrast 10/09/2019:  degenerative changes noted without cord abnormality or pathologic enhancement  Plan:  · Medical management and pain control per primary team  · Recommend Medrol Dosepak upon discharge  · Recommend patient received script for new orthotics for bilateral foot drop  · DVT prophylaxis:  SCDs and lovenox  · Mobilize as tolerated with assistance, patient up ambulating in his room in the hallways    Neurosurgery will follow as needed during hospitalization  At this time patient does not exhibit any red flag signs that warrant emergent surgery  He may follow up in the outpatient setting for Outpatient Surgical intervention on 10/14  Please call with questions or concerns, signed off  Okay to discharge home

## 2019-10-10 NOTE — ASSESSMENT & PLAN NOTE
· Presenting with 4-5 day history of back pain with right foot drop and subjective right leg weakness  · Does have history of similar and underwent L3-4 laminectomy 12/2018  Also has history of left foot drop undergoing surgery in [place] for this however persists with left foot drop  · Initially seen in the ED 10/07/2019 with this complaint and patient desiring outpatient workup, however patient presented again today to expedite workup and is agreeable to inpatient workup  · MRI lumbar and thoracic spine: New right L2-3 paracentral disc extrusion with inferior migration into the lateral recess impinging on the traversing right L3 nerve root  New shallow right central disc herniation with mild right ventral thecal sac indentation at L1-L2    · Neurosurgical consult: to reevaluate now that imaging has resulted  · Initiate neuro checks  · Continue Robaxin, add Lidoderm patch

## 2019-10-10 NOTE — PROGRESS NOTES
Patient was visibly angry and upset as he came to the ED 10/8 with sneakers and an orthotic insert and realized 10/9 that they were not in his room  Multiple calls were made by myself and other staff to the ed charge nurse who stated she did not see anything in room 17 and would call Environmental Services to confirm if they had seen the belongings  At 1700 the patient was found to be punching the side rails and visibly irritated about his shoes  He continued to follow me throughout the unit insisting I find his shoes  I told him I have done everything I can to locate them  Later in the night the patient stated it was Chon the ED tech that brought him up by stretcher  I was told by ED staff that Chon did bring the patient up and stated he left the belongings underneath the stretcher  Staff in the ED then checked underneath the stretchers  At this point no shoes/orthotic have been found  Patient is very upset and I apologized for the inconvenience and advised him we will keep him posted on his belongings

## 2019-10-10 NOTE — OCCUPATIONAL THERAPY NOTE
Occupational Therapy Cancellation Note  OT orders received, pt's chart reviewed  MRI lumbar spine demonstrated new R L2-3 paracentral disc extrusion with inferior migration into lateral recess impinging on the traverse L3 nerve root and new shallow R central disc herniation at L1-L2  Will hold off on initial OT evaluation pending neurosurgical consultation s/p imaging results      Jake Kennedy, LANE, OTR/L

## 2019-10-10 NOTE — PROGRESS NOTES
Progress Note - Nisha Alford 1960, 60 y o  male MRN: [id number]    Unit/Bed#: -01 Encounter: [id number]    Primary Care Provider: ABNER Campbell   Date and time admitted to hospital: 10/8/2019  7:21 PM        * Right foot drop  Assessment & Plan  · Patient complaining of RLE numbness and weakness after lifting heavy object about 1 month ago and subsequently falling down the stairs over the weekend due to right leg buckling  · S/p LAMINECTOMY LUMBAR LAMINECTOMY, DISCECTOMY, FORAMINOTOMY, L3-4, RIGHT in 12/2018 for right foot drop which is persistent  · Was seen in the ED on 10/7 and discharged but presented again to expedite workup    Imaging reviewed personally with attending, results are as follows:  · MRI lumbar spine without with contrast 10/09/2019:  New right L2-3 paracentral disc extrusion with inferior migration into the lateral recess impinging on the traversing right L3 nerve root  New shallow right central disc herniation with mild right ventral thecal sac indentation at L2-3  Stable postoperative changes noted  · MRI thoracic spine with and without contrast 10/09/2019:  degenerative changes noted without cord abnormality or pathologic enhancement  Plan:  · Medical management and pain control per primary team  · Recommend Medrol Dosepak upon discharge  · Recommend patient received script for new orthotics for bilateral foot drop  · DVT prophylaxis:  SCDs and lovenox  · Mobilize as tolerated with assistance, patient up ambulating in his room in the hallways    Neurosurgery will follow as needed during hospitalization  At this time patient does not exhibit any red flag signs that warrant emergent surgery  He may follow up in the outpatient setting for Outpatient Surgical intervention on 10/14  Please call with questions or concerns, signed off  Okay to discharge home      Essential hypertension  Assessment & Plan  · Medical management per primary team  · Currently on amlodipine      Subjective/Objective   Chief Complaint: "I want to go home "    Subjective:  Patient continues to complain of right-sided back pain with radiation to mid thigh, described as sore  He continues to have numbness from distal thigh into calf  Patient is still ambulating without issue  Urinating without issue  No bowel or bladder issues, saddle anesthesia, worsening of numbness/tingling/weakness, headaches, dizziness, chest pain, shortness of breath, abdominal pain, nausea or vomiting  Objective:  Up ambulating in the hallway and in his bedroom, NAD    I/O       10/08 0701 - 10/09 0700 10/09 0701 - 10/10 0700 10/10 0701 - 10/11 0700    P  O   1610 960    Total Intake(mL/kg)  1610 (19 5) 960 (11 6)    Urine (mL/kg/hr)  600 (0 3)     Total Output  600     Net  +1010 +960           Unmeasured Urine Occurrence  2 x           Invasive Devices     None                 Physical Exam:  Vitals: Blood pressure 157/95, pulse 68, temperature 97 8 °F (36 6 °C), resp  rate 19, height 6' 3" (1 905 m), weight 82 6 kg (182 lb 1 6 oz), SpO2 90 %  ,Body mass index is 22 76 kg/m²        General appearance: alert, appears stated age, cooperative and no distress  Head: Normocephalic, without obvious abnormality, atraumatic  Eyes:  Conjugate gaze  Neck: supple, symmetrical, trachea midline and NT  Back: no kyphosis present, no tenderness to percussion or palpation  Lungs: non labored breathing  Heart: regular heart rate  Neurologic:   Mental status: Alert, oriented x3, follows commands, thought content appropriate  Cranial nerves: grossly intact (Cranial nerves II-XII)  Sensory:  Decreased sensation to light touch on right lower extremity, decreased sensation to pinprick on right lateral thigh and calf, DST intact  Motor: moving all extremities with right lower extremity weakness and continued bilateral foot drop, strength 5/5 except as noted:   RLE:  4/5 HF, 4+/5 HE, 3+/5 KE, 0/5 DF   LLE:  0/5 DF  Reflexes: 2+ and symmetric, no giordano or clonus noted  Coordination: finger to nose normal bilaterally, no drift bilaterally      Lab Results:  Results from last 7 days   Lab Units 10/08/19  2159   WBC Thousand/uL 10 84*   HEMOGLOBIN g/dL 15 4   HEMATOCRIT % 46 7   PLATELETS Thousands/uL 255   NEUTROS PCT % 71   MONOS PCT % 8     Results from last 7 days   Lab Units 10/08/19  2159   POTASSIUM mmol/L 4 2   CHLORIDE mmol/L 106   CO2 mmol/L 30   BUN mg/dL 23   CREATININE mg/dL 1 05   CALCIUM mg/dL 9 0       Imaging Studies: I have personally reviewed pertinent reports  and I have personally reviewed pertinent films in PACS    Mri Thoracic Spine W Wo Contrast    Result Date: 10/10/2019  Impression: Lower cervical and multilevel thoracic degenerative discogenic disease as described above  The notable disc herniations are noted on the left at T1-T2, on the left at T8-T9, and on the right at T10-T11  There is ventral indentation on the thecal sac and mild flattening of the ventral cord margin without cord signal abnormality  Additional smaller disc herniations without spinal canal or foraminal stenosis as described above  No cord signal abnormality or pathologic enhancement  Workstation performed: [id number]     Mri Lumbar Spine W Wo Contrast    Result Date: 10/10/2019  Impression: New right L2-3 paracentral disc extrusion with inferior migration into the lateral recess impinging on the traversing right L3 nerve root  Surgical consultation recommended  New shallow right central disc herniation with mild right ventral thecal sac indentation at L1-L2  Interval discectomy of the previously noted right L3-4 disc extrusion  Stable central to left central disc herniation at L3-L4 with left subarticular recess encroachment, correlate for chronic left L4 radiculopathy  Chronic bilateral L4-5 and L5-S1 foraminal stenosis  Postoperative changes of right L3-4 and left L4-5 and L5-S1 laminotomy   Stable grade 1 anterolisthesis of L5 on S1 with chronic bilateral L5 spondylolysis  The study was marked in EPIC for significant notification  Workstation performed: [id number]     EKG, Pathology, and Other Studies: I have personally reviewed pertinent reports        VTE Pharmacologic Prophylaxis: Enoxaparin (Lovenox)    VTE Mechanical Prophylaxis: sequential compression device

## 2019-10-10 NOTE — ASSESSMENT & PLAN NOTE
· Presenting with 4-5 day history of back pain with right foot drop and subjective right leg weakness  · Does have history of similar and underwent L3-4 laminectomy 12/2018  Also has history of left foot drop undergoing surgery in [place] for this however persists with left foot drop  · Initially seen in the ED 10/07/2019 with this complaint and patient desiring outpatient workup, however patient presented again today to expedite workup and is agreeable to inpatient workup  · MRI lumbar and thoracic spine: New right L2-3 paracentral disc extrusion with inferior migration into the lateral recess impinging on the traversing right L3 nerve root  New shallow right central disc herniation with mild right ventral thecal sac indentation at L1-L2    · Neurosurgical consult:  Pursue surgical intervention in November, will discharge on Medrol Dosepak, with prescription for new orthotics for footdrop  · Initiate neuro checks  · Continue Robaxin, add Lidoderm patch

## 2019-10-11 ENCOUNTER — TRANSITIONAL CARE MANAGEMENT (OUTPATIENT)
Dept: FAMILY MEDICINE CLINIC | Facility: CLINIC | Age: 59
End: 2019-10-11

## 2019-10-17 ENCOUNTER — OFFICE VISIT (OUTPATIENT)
Dept: FAMILY MEDICINE CLINIC | Facility: CLINIC | Age: 59
End: 2019-10-17
Payer: COMMERCIAL

## 2019-10-17 ENCOUNTER — TELEPHONE (OUTPATIENT)
Dept: NEUROSURGERY | Facility: CLINIC | Age: 59
End: 2019-10-17

## 2019-10-17 VITALS
WEIGHT: 182 LBS | SYSTOLIC BLOOD PRESSURE: 132 MMHG | HEART RATE: 92 BPM | BODY MASS INDEX: 22.63 KG/M2 | DIASTOLIC BLOOD PRESSURE: 68 MMHG | HEIGHT: 75 IN

## 2019-10-17 DIAGNOSIS — M21.372 LEFT FOOT DROP: ICD-10-CM

## 2019-10-17 DIAGNOSIS — M21.371 RIGHT FOOT DROP: Primary | ICD-10-CM

## 2019-10-17 PROCEDURE — 99495 TRANSJ CARE MGMT MOD F2F 14D: CPT | Performed by: NURSE PRACTITIONER

## 2019-10-17 NOTE — ASSESSMENT & PLAN NOTE
Patient has appoint with Neurosurgery October 28, 2019  He has a new right L2-L3 disc extrusion transversing the L3 root and a new right sided L1-L2 herniation  He was sent home with a Medrol Dosepak Robaxin and Lidoderm patches and orthotics  He completed the Medrol Dosepak is using the orthotic to the right

## 2019-10-17 NOTE — TELEPHONE ENCOUNTER
Nazanin Freshwater requesting a call back because he has some questions  Attempted to reach patient with no answer  LMOM for call back at his convenience

## 2019-10-17 NOTE — PROGRESS NOTES
Assessment and Plan:    Problem List Items Addressed This Visit        Other    Right foot drop - Primary     Patient has appoint with Neurosurgery October 28, 2019  He has a new right L2-L3 disc extrusion transversing the L3 root and a new right sided L1-L2 herniation  He was sent home with a Medrol Dosepak Robaxin and Lidoderm patches and orthotics  He completed the Medrol Dosepak is using the orthotic to the right  Left foot drop                 Diagnoses and all orders for this visit:    Right foot drop    Left foot drop              Subjective:      Patient ID: Rosa Gonsalves is a 60 y o  male  CC:    Chief Complaint   Patient presents with    Transition of Care Management     patient is here for TCM for 10/8-10/10 at [facility] for right leg weakness, pain-fell down stairs at home  MRI showed L2-L3 disc herniations  Patient sees Dr Clary Shoemaker for surgery consult on 10/28  ak        HPI:    Patient states his insurance does not kick in until November 1st he will be paying out of pocket for his visit with us and with specialist  Patient states he feels desperate for the last 3 weeks  Patient taking trazodone at night but still only getting about 4 hours of rest  He was on prednisone which he had night sweats  He did complete the course however  He requesting new orthotics  The following portions of the patient's history were reviewed and updated as appropriate: allergies, current medications, past family history, past medical history, past social history, past surgical history and problem list       Review of Systems   Constitutional: Negative for diaphoresis and fever  HENT: Positive for postnasal drip  Negative for congestion and trouble swallowing  Respiratory: Negative for cough, chest tightness and shortness of breath  Cardiovascular: Negative for chest pain and palpitations  Gastrointestinal: Negative for constipation and diarrhea  Genitourinary: Negative for difficulty urinating  Neurological: Positive for weakness, light-headedness and numbness  Negative for dizziness and headaches  Psychiatric/Behavioral: Positive for sleep disturbance  Negative for dysphoric mood  The patient is not nervous/anxious  Data to review:       Objective:    Vitals:    10/17/19 1402   BP: 132/68   Pulse: 92   Weight: 82 6 kg (182 lb)   Height: 6' 3" (1 905 m)        Physical Exam   Constitutional: He is oriented to person, place, and time  Vital signs are normal  He appears well-developed and well-nourished  He does not appear ill  HENT:   Head: Normocephalic and atraumatic  Eyes: Pupils are equal    Cardiovascular: Normal rate, regular rhythm, S1 normal and S2 normal    No murmur heard  Pulmonary/Chest: Effort normal and breath sounds normal  No respiratory distress  Musculoskeletal:   Patient right quadriceps muscles is quivering with standing  There is noticeable muscle atrophy or non contractility with rom  Neurological: He is alert and oriented to person, place, and time  A sensory deficit is present  Patient has orthotic in place on his right leg which is not in good condition  Psychiatric: He has a normal mood and affect   Thought content normal

## 2019-10-28 ENCOUNTER — OFFICE VISIT (OUTPATIENT)
Dept: NEUROSURGERY | Facility: CLINIC | Age: 59
End: 2019-10-28
Payer: COMMERCIAL

## 2019-10-28 ENCOUNTER — TELEPHONE (OUTPATIENT)
Dept: NEUROSURGERY | Facility: CLINIC | Age: 59
End: 2019-10-28

## 2019-10-28 VITALS
HEIGHT: 75 IN | TEMPERATURE: 98 F | BODY MASS INDEX: 22.26 KG/M2 | WEIGHT: 179 LBS | SYSTOLIC BLOOD PRESSURE: 130 MMHG | DIASTOLIC BLOOD PRESSURE: 90 MMHG | HEART RATE: 83 BPM

## 2019-10-28 DIAGNOSIS — M21.372 LEFT FOOT DROP: ICD-10-CM

## 2019-10-28 DIAGNOSIS — M21.371 RIGHT FOOT DROP: ICD-10-CM

## 2019-10-28 DIAGNOSIS — I10 ESSENTIAL HYPERTENSION: ICD-10-CM

## 2019-10-28 DIAGNOSIS — R29.898 WEAKNESS OF RIGHT LOWER EXTREMITY: ICD-10-CM

## 2019-10-28 DIAGNOSIS — S23.3XXA SPRAIN OF UPPER BACK, INITIAL ENCOUNTER: ICD-10-CM

## 2019-10-28 DIAGNOSIS — M54.16 LUMBAR RADICULOPATHY: Primary | ICD-10-CM

## 2019-10-28 PROCEDURE — 99214 OFFICE O/P EST MOD 30 MIN: CPT | Performed by: NEUROLOGICAL SURGERY

## 2019-10-28 RX ORDER — AMLODIPINE BESYLATE 2.5 MG/1
2.5 TABLET ORAL DAILY
Qty: 30 TABLET | Refills: 5 | Status: SHIPPED | OUTPATIENT
Start: 2019-10-28 | End: 2021-04-02 | Stop reason: SDUPTHER

## 2019-10-28 RX ORDER — CHLORHEXIDINE GLUCONATE 0.12 MG/ML
15 RINSE ORAL ONCE
Status: CANCELLED | OUTPATIENT
Start: 2019-10-28 | End: 2019-10-28

## 2019-10-28 RX ORDER — CEFAZOLIN SODIUM 2 G/50ML
2000 SOLUTION INTRAVENOUS ONCE
Status: CANCELLED | OUTPATIENT
Start: 2019-11-12 | End: 2019-10-28

## 2019-10-28 NOTE — TELEPHONE ENCOUNTER
Signed surgical consent in the presence of surgeon after procedure explained: LUMBAR LAMINOTOMY/FORAMINOTOMY/FACETECTOMY/DISCECTOMY L2-3, RIGHT (Right Spine Lumbar)-11/5/2019     Assessment for comorbid medical conditions:   HO adverse response to general anesthesia:urinary retention   Surgical Procedures past 6 months:December 2018 SX LAMINECTOMY LUMBAR LAMINECTOMY, DISCECTOMY, FORAMINOTOMY, L3-4, RIGHT (Right Spine Lumbar)--Dr Piper Ma , reports problems urinating after this surgery     Cardiac:denies   Pulmonary: does not smoke or use O2 or CPAP     Endocrine:  Denies DM or other system related conditions  MISC/Oncology/hematology : HO Bladder cancer  2016  BCG treatments x 6 weeks , weekly RX , every 6 months f/o with urologist @ [facility] Bacillus Calmette-Guérin (BCG) is used as a solution that is run through a tube (instilled through a catheter) into the bladder to treat bladder cancer  The exact way it works against cancer is not known, but it may work by stimulating the body's immune system  Infection/immuno: chronic Herpes treating with Valtrex     Neuro ;/MS; bilateral drop foot , wearing right leg mofo splints    Skin intact//GI- (urostomy, colostomy, ileostomy, intermittent catheterization):treating wit Flomax has BPH     Personal history of venous thromboembolic disease: denies     Imagining: MRI Lumbar spine 10/9/2019 images in PACS viewed     Pain management:  Tramadol prn prescribed by PCP office     Medications preoperative and postoperative (AC, Antiplatelet, ASA, NSAID, vitamins , dietary supplements , OTC) --reviewed in detail       Discussed overview of surgical process from office appointment thru 6 weeks post op:  Patient verbalized understanding, all questions were answered and contact information provided in the event future questions arise

## 2019-10-28 NOTE — PROGRESS NOTES
Assessment/Plan:    No problem-specific Assessment & Plan notes found for this encounter  Patient is stable  Symptoms, as detailed in HPI, continue to significantly impact of patient's quality of life in daily activities  After carefully considering presentation, investigations, functional status and co-morbidities, the risk/benefit profile of surgical intervention is favorable  History, physical examination and diagnostic tests were reviewed and questions answered  Diagnosis, care plan and treatment options were discussed  The patient understand instructions and will follow up as directed  Patient with adjacent level herniation at L2-3 on the right  He has significant weakness and disability  This is new compared to prior history  We will proceed with lumbar discectomy at L2-3 on the right     Expected postoperative course, including activity restrictions, expected pain and postoperative medication were reviewed  Patient provided verbal consent to surgical procedure and signed consent form: Yes    We also discussed the risks and benefits of the procedure the risks being including: infection (~2%), neurologic injury (<1%), new pain, revisions surgery, failure to relieve pain, weakness, numbness  The benefits including relief of pain  The patient stated understanding of the risks and benefits and agreed to proceed  IMAGING REVIEWED: MRI lumbar shows lateral recess compression at L2-3 on the right d/t herniated disc fragment    I have spent 25 minutes with Patient  today in which greater than 50% of this time was spent in counseling/coordination of care regarding Diagnostic results, Prognosis, Risks and benefits of tx options and Impressions         Diagnoses and all orders for this visit:    Lumbar radiculopathy  -     Case request operating room: LUMBAR LAMINOTOMY/FORAMINOTOMY/FACETECTOMY/DISCECTOMY L2-3, RIGHT; Standing  -     Case request operating room: LUMBAR LAMINOTOMY/FORAMINOTOMY/FACETECTOMY/DISCECTOMY L2-3, RIGHT    Weakness of right lower extremity  -     Ambulatory referral to Neurosurgery    Right foot drop  -     Ambulatory referral to Neurosurgery    Left foot drop  -     Ambulatory referral to Neurosurgery    Sprain of upper back, initial encounter  -     Ambulatory referral to Neurosurgery    Other orders  -     Diet NPO; Sips with meds; Standing  -     Nursing Communication [facility] Interventions Implemented; Standing  -     Nursing Communication Use 2 CHG cloths, have staff wash the entire body (neck down) ; Standing  -     Nursing Communication Swab both nares with Povidone-Iodine solution, EXCLUDE if patient has shellfish/Iodine allergy; Standing  -     chlorhexidine (PERIDEX) 0 12 % oral rinse 15 mL  -     Void on call to OR; Standing  -     Insert peripheral IV; Standing  -     ceFAZolin (ANCEF) 2,000 mg in dextrose 5 % 100 mL IVPB          Subjective:      Patient ID: Samara Stewart is a 60 y o  male  Patient is a 60year old male with symptoms of right thigh pain and numbness  Pain is throbbing in quality with paresthesias  Pain distribution is right thigh and knee  Pain is worse with activity  The pain has been present for the past few months  Pain has associated disability and weakness  The patient reported symptoms started after sexual activity  No bowel or bladder issues  The following portions of the patient's history were reviewed and updated as appropriate: He  has a past medical history of Dry skin dermatitis, Dyspepsia, and Hypertension    He   Patient Active Problem List    Diagnosis Date Noted    Anxiety 09/16/2019    Primary insomnia 09/16/2019    Left foot drop 09/16/2019    Sprain of upper back 09/16/2019    Right foot drop 12/12/2018    [place] syndrome 09/27/2017    Essential hypertension 01/06/2017    History of bladder cancer 06/03/2016    BPH 10/05/2015    Right footdrop due to L3-L4 disc protrusion  08/21/2015     He has a past surgical history that includes Back surgery (Left); Bladder surgery; and Posterior laminectomy thoracic and lumbar spine (Right, 12/17/2018)  His family history includes Heart attack in his brother and father; Raynaud syndrome in his mother; Stomach cancer in his mother  He  reports that he has never smoked  He has never used smokeless tobacco  He reports that he drinks alcohol  He reports that he does not use drugs  Current Outpatient Medications   Medication Sig Dispense Refill    amLODIPine (NORVASC) 2 5 mg tablet Take 1 tablet (2 5 mg total) by mouth daily 30 tablet 5    methocarbamol (ROBAXIN) 750 mg tablet Take 1 tablet (750 mg total) by mouth every 6 (six) hours as needed for muscle spasms for up to 14 days 30 tablet 1    tamsulosin (FLOMAX) 0 4 mg Take 1 capsule by mouth daily      traZODone (DESYREL) 50 mg tablet Take 1 tablet (50 mg total) by mouth daily at bedtime 90 tablet 1    valACYclovir (VALTREX) 500 mg tablet Take 500 mg by mouth daily   3     No current facility-administered medications for this visit  Current Outpatient Medications on File Prior to Visit   Medication Sig    amLODIPine (NORVASC) 2 5 mg tablet Take 1 tablet (2 5 mg total) by mouth daily    methocarbamol (ROBAXIN) 750 mg tablet Take 1 tablet (750 mg total) by mouth every 6 (six) hours as needed for muscle spasms for up to 14 days    tamsulosin (FLOMAX) 0 4 mg Take 1 capsule by mouth daily    traZODone (DESYREL) 50 mg tablet Take 1 tablet (50 mg total) by mouth daily at bedtime    valACYclovir (VALTREX) 500 mg tablet Take 500 mg by mouth daily     [DISCONTINUED] methylPREDNISolone 4 MG tablet therapy pack Use as directed on package (Patient not taking: Reported on 10/17/2019)     No current facility-administered medications on file prior to visit  He has No Known Allergies       Review of Systems   Musculoskeletal: Positive for back pain (low back pain right side) and gait problem          Right hip pain      Neurological: Positive for weakness (both feet and right leg )  Tingling right leg      All other systems reviewed and are negative  Objective:      /90 (BP Location: Left arm, Patient Position: Sitting, Cuff Size: Standard)   Pulse 83   Temp 98 °F (36 7 °C) (Temporal)   Ht 6' 3" (1 905 m)   Wt 81 2 kg (179 lb)   BMI 22 37 kg/m²          Physical Exam   Constitutional: He is oriented to person, place, and time  He appears well-developed and well-nourished  No distress  HENT:   Head: Normocephalic and atraumatic  Nose: Nose normal    Eyes: Pupils are equal, round, and reactive to light  Conjunctivae and EOM are normal  Right eye exhibits no discharge  Left eye exhibits no discharge  Neck: Normal range of motion  No tracheal deviation present  No thyromegaly present  Cardiovascular: Normal rate  Pulmonary/Chest: Effort normal and breath sounds normal  No stridor  No respiratory distress  Abdominal: Soft  Musculoskeletal: Normal range of motion  He exhibits no edema, tenderness or deformity  Neurological: He is alert and oriented to person, place, and time  He has normal strength  He displays atrophy  He displays no tremor  A sensory deficit is present  No cranial nerve deficit  He exhibits abnormal muscle tone  RLE 4-/5 HF/KE, 3/5 DF/PF, 2/5 EHL  LLE 5/5 except 1/5 DF EHL  Numbness over L3 RLE   Skin: Skin is warm and dry  No rash noted  He is not diaphoretic  No erythema  No pallor  Psychiatric: He has a normal mood and affect   His behavior is normal  Judgment and thought content normal

## 2019-10-28 NOTE — H&P (VIEW-ONLY)
Assessment/Plan:    No problem-specific Assessment & Plan notes found for this encounter  Patient is stable  Symptoms, as detailed in HPI, continue to significantly impact of patient's quality of life in daily activities  After carefully considering presentation, investigations, functional status and co-morbidities, the risk/benefit profile of surgical intervention is favorable  History, physical examination and diagnostic tests were reviewed and questions answered  Diagnosis, care plan and treatment options were discussed  The patient understand instructions and will follow up as directed  Patient with adjacent level herniation at L2-3 on the right  He has significant weakness and disability  This is new compared to prior history  We will proceed with lumbar discectomy at L2-3 on the right     Expected postoperative course, including activity restrictions, expected pain and postoperative medication were reviewed  Patient provided verbal consent to surgical procedure and signed consent form: Yes    We also discussed the risks and benefits of the procedure the risks being including: infection (~2%), neurologic injury (<1%), new pain, revisions surgery, failure to relieve pain, weakness, numbness  The benefits including relief of pain  The patient stated understanding of the risks and benefits and agreed to proceed  IMAGING REVIEWED: MRI lumbar shows lateral recess compression at L2-3 on the right d/t herniated disc fragment    I have spent 25 minutes with Patient  today in which greater than 50% of this time was spent in counseling/coordination of care regarding Diagnostic results, Prognosis, Risks and benefits of tx options and Impressions         Diagnoses and all orders for this visit:    Lumbar radiculopathy  -     Case request operating room: LUMBAR LAMINOTOMY/FORAMINOTOMY/FACETECTOMY/DISCECTOMY L2-3, RIGHT; Standing  -     Case request operating room: LUMBAR LAMINOTOMY/FORAMINOTOMY/FACETECTOMY/DISCECTOMY L2-3, RIGHT    Weakness of right lower extremity  -     Ambulatory referral to Neurosurgery    Right foot drop  -     Ambulatory referral to Neurosurgery    Left foot drop  -     Ambulatory referral to Neurosurgery    Sprain of upper back, initial encounter  -     Ambulatory referral to Neurosurgery    Other orders  -     Diet NPO; Sips with meds; Standing  -     Nursing Communication [facility] Interventions Implemented; Standing  -     Nursing Communication Use 2 CHG cloths, have staff wash the entire body (neck down) ; Standing  -     Nursing Communication Swab both nares with Povidone-Iodine solution, EXCLUDE if patient has shellfish/Iodine allergy; Standing  -     chlorhexidine (PERIDEX) 0 12 % oral rinse 15 mL  -     Void on call to OR; Standing  -     Insert peripheral IV; Standing  -     ceFAZolin (ANCEF) 2,000 mg in dextrose 5 % 100 mL IVPB          Subjective:      Patient ID: Simon Schaeffer is a 60 y o  male  Patient is a 60year old male with symptoms of right thigh pain and numbness  Pain is throbbing in quality with paresthesias  Pain distribution is right thigh and knee  Pain is worse with activity  The pain has been present for the past few months  Pain has associated disability and weakness  The patient reported symptoms started after sexual activity  No bowel or bladder issues  The following portions of the patient's history were reviewed and updated as appropriate: He  has a past medical history of Dry skin dermatitis, Dyspepsia, and Hypertension    He   Patient Active Problem List    Diagnosis Date Noted    Anxiety 09/16/2019    Primary insomnia 09/16/2019    Left foot drop 09/16/2019    Sprain of upper back 09/16/2019    Right foot drop 12/12/2018    [place] syndrome 09/27/2017    Essential hypertension 01/06/2017    History of bladder cancer 06/03/2016    BPH 10/05/2015    Right footdrop due to L3-L4 disc protrusion  08/21/2015     He has a past surgical history that includes Back surgery (Left); Bladder surgery; and Posterior laminectomy thoracic and lumbar spine (Right, 12/17/2018)  His family history includes Heart attack in his brother and father; Raynaud syndrome in his mother; Stomach cancer in his mother  He  reports that he has never smoked  He has never used smokeless tobacco  He reports that he drinks alcohol  He reports that he does not use drugs  Current Outpatient Medications   Medication Sig Dispense Refill    amLODIPine (NORVASC) 2 5 mg tablet Take 1 tablet (2 5 mg total) by mouth daily 30 tablet 5    methocarbamol (ROBAXIN) 750 mg tablet Take 1 tablet (750 mg total) by mouth every 6 (six) hours as needed for muscle spasms for up to 14 days 30 tablet 1    tamsulosin (FLOMAX) 0 4 mg Take 1 capsule by mouth daily      traZODone (DESYREL) 50 mg tablet Take 1 tablet (50 mg total) by mouth daily at bedtime 90 tablet 1    valACYclovir (VALTREX) 500 mg tablet Take 500 mg by mouth daily   3     No current facility-administered medications for this visit  Current Outpatient Medications on File Prior to Visit   Medication Sig    amLODIPine (NORVASC) 2 5 mg tablet Take 1 tablet (2 5 mg total) by mouth daily    methocarbamol (ROBAXIN) 750 mg tablet Take 1 tablet (750 mg total) by mouth every 6 (six) hours as needed for muscle spasms for up to 14 days    tamsulosin (FLOMAX) 0 4 mg Take 1 capsule by mouth daily    traZODone (DESYREL) 50 mg tablet Take 1 tablet (50 mg total) by mouth daily at bedtime    valACYclovir (VALTREX) 500 mg tablet Take 500 mg by mouth daily     [DISCONTINUED] methylPREDNISolone 4 MG tablet therapy pack Use as directed on package (Patient not taking: Reported on 10/17/2019)     No current facility-administered medications on file prior to visit  He has No Known Allergies       Review of Systems   Musculoskeletal: Positive for back pain (low back pain right side) and gait problem          Right hip pain      Neurological: Positive for weakness (both feet and right leg )  Tingling right leg      All other systems reviewed and are negative  Objective:      /90 (BP Location: Left arm, Patient Position: Sitting, Cuff Size: Standard)   Pulse 83   Temp 98 °F (36 7 °C) (Temporal)   Ht 6' 3" (1 905 m)   Wt 81 2 kg (179 lb)   BMI 22 37 kg/m²          Physical Exam   Constitutional: He is oriented to person, place, and time  He appears well-developed and well-nourished  No distress  HENT:   Head: Normocephalic and atraumatic  Nose: Nose normal    Eyes: Pupils are equal, round, and reactive to light  Conjunctivae and EOM are normal  Right eye exhibits no discharge  Left eye exhibits no discharge  Neck: Normal range of motion  No tracheal deviation present  No thyromegaly present  Cardiovascular: Normal rate  Pulmonary/Chest: Effort normal and breath sounds normal  No stridor  No respiratory distress  Abdominal: Soft  Musculoskeletal: Normal range of motion  He exhibits no edema, tenderness or deformity  Neurological: He is alert and oriented to person, place, and time  He has normal strength  He displays atrophy  He displays no tremor  A sensory deficit is present  No cranial nerve deficit  He exhibits abnormal muscle tone  RLE 4-/5 HF/KE, 3/5 DF/PF, 2/5 EHL  LLE 5/5 except 1/5 DF EHL  Numbness over L3 RLE   Skin: Skin is warm and dry  No rash noted  He is not diaphoretic  No erythema  No pallor  Psychiatric: He has a normal mood and affect   His behavior is normal  Judgment and thought content normal

## 2019-10-30 ENCOUNTER — TELEPHONE (OUTPATIENT)
Dept: FAMILY MEDICINE CLINIC | Facility: CLINIC | Age: 59
End: 2019-10-30

## 2019-10-30 DIAGNOSIS — F51.01 PRIMARY INSOMNIA: ICD-10-CM

## 2019-10-30 RX ORDER — TRAZODONE HYDROCHLORIDE 50 MG/1
50 TABLET ORAL
Qty: 90 TABLET | Refills: 0 | Status: SHIPPED | OUTPATIENT
Start: 2019-10-30 | End: 2019-11-01 | Stop reason: CLARIF

## 2019-10-30 NOTE — TELEPHONE ENCOUNTER
HN, Pt states he is having back surgery 11/12/2019 and is requesting a refill for his Trazadone to take at bedtime so he can sleep

## 2019-10-31 ENCOUNTER — LAB (OUTPATIENT)
Dept: LAB | Facility: HOSPITAL | Age: 59
End: 2019-10-31
Attending: NEUROLOGICAL SURGERY
Payer: COMMERCIAL

## 2019-10-31 ENCOUNTER — HOSPITAL ENCOUNTER (OUTPATIENT)
Dept: NON INVASIVE DIAGNOSTICS | Facility: HOSPITAL | Age: 59
Discharge: HOME | End: 2019-10-31
Attending: NEUROLOGICAL SURGERY
Payer: COMMERCIAL

## 2019-10-31 DIAGNOSIS — M21.371 RIGHT FOOT DROP: ICD-10-CM

## 2019-10-31 DIAGNOSIS — R29.898 WEAKNESS OF RIGHT LOWER EXTREMITY: ICD-10-CM

## 2019-10-31 DIAGNOSIS — M21.372 LEFT FOOT DROP: ICD-10-CM

## 2019-10-31 DIAGNOSIS — S23.3XXA SPRAIN OF UPPER BACK, INITIAL ENCOUNTER: ICD-10-CM

## 2019-10-31 DIAGNOSIS — M54.16 LUMBAR RADICULOPATHY: ICD-10-CM

## 2019-10-31 LAB
ALBUMIN SERPL BCP-MCNC: 3.6 G/DL (ref 3.5–5)
ALP SERPL-CCNC: 58 U/L (ref 46–116)
ALT SERPL W P-5'-P-CCNC: 29 U/L (ref 12–78)
ANION GAP SERPL CALCULATED.3IONS-SCNC: 5 MMOL/L (ref 4–13)
APTT PPP: 30 SECONDS (ref 23–37)
AST SERPL W P-5'-P-CCNC: 21 U/L (ref 5–45)
ATRIAL RATE: 81 BPM
BASOPHILS # BLD AUTO: 0.02 THOUSANDS/ΜL (ref 0–0.1)
BASOPHILS NFR BLD AUTO: 0 % (ref 0–1)
BILIRUB SERPL-MCNC: 1.04 MG/DL (ref 0.2–1)
BILIRUB UR QL STRIP: NEGATIVE
BUN SERPL-MCNC: 22 MG/DL (ref 5–25)
CALCIUM SERPL-MCNC: 8.8 MG/DL (ref 8.3–10.1)
CHLORIDE SERPL-SCNC: 102 MMOL/L (ref 100–108)
CLARITY UR: CLEAR
CO2 SERPL-SCNC: 32 MMOL/L (ref 21–32)
COLOR UR: YELLOW
CREAT SERPL-MCNC: 1.05 MG/DL (ref 0.6–1.3)
EOSINOPHIL # BLD AUTO: 0.17 THOUSAND/ΜL (ref 0–0.61)
EOSINOPHIL NFR BLD AUTO: 3 % (ref 0–6)
ERYTHROCYTE [DISTWIDTH] IN BLOOD BY AUTOMATED COUNT: 12.2 % (ref 11.6–15.1)
EST. AVERAGE GLUCOSE BLD GHB EST-MCNC: 117 MG/DL
GFR SERPL CREATININE-BSD FRML MDRD: 77 ML/MIN/1.73SQ M
GLUCOSE P FAST SERPL-MCNC: 88 MG/DL (ref 65–99)
GLUCOSE UR STRIP-MCNC: NEGATIVE MG/DL
HBA1C MFR BLD: 5.7 % (ref 4.2–6.3)
HCT VFR BLD AUTO: 49.5 % (ref 36.5–49.3)
HGB BLD-MCNC: 16.1 G/DL (ref 12–17)
HGB UR QL STRIP.AUTO: NEGATIVE
IMM GRANULOCYTES # BLD AUTO: 0.02 THOUSAND/UL (ref 0–0.2)
IMM GRANULOCYTES NFR BLD AUTO: 0 % (ref 0–2)
INR PPP: 0.98 (ref 0.84–1.19)
KETONES UR STRIP-MCNC: NEGATIVE MG/DL
LEUKOCYTE ESTERASE UR QL STRIP: NEGATIVE
LYMPHOCYTES # BLD AUTO: 1.05 THOUSANDS/ΜL (ref 0.6–4.47)
LYMPHOCYTES NFR BLD AUTO: 16 % (ref 14–44)
MCH RBC QN AUTO: 30.6 PG (ref 26.8–34.3)
MCHC RBC AUTO-ENTMCNC: 32.5 G/DL (ref 31.4–37.4)
MCV RBC AUTO: 94 FL (ref 82–98)
MONOCYTES # BLD AUTO: 0.41 THOUSAND/ΜL (ref 0.17–1.22)
MONOCYTES NFR BLD AUTO: 6 % (ref 4–12)
NEUTROPHILS # BLD AUTO: 5 THOUSANDS/ΜL (ref 1.85–7.62)
NEUTS SEG NFR BLD AUTO: 75 % (ref 43–75)
NITRITE UR QL STRIP: NEGATIVE
NRBC BLD AUTO-RTO: 0 /100 WBCS
P AXIS: 50 DEGREES
PH UR STRIP.AUTO: 5.5 [PH]
PLATELET # BLD AUTO: 207 THOUSANDS/UL (ref 149–390)
PMV BLD AUTO: 10 FL (ref 8.9–12.7)
POTASSIUM SERPL-SCNC: 3.9 MMOL/L (ref 3.5–5.3)
PR INTERVAL: 150 MS
PROT SERPL-MCNC: 7.1 G/DL (ref 6.4–8.2)
PROT UR STRIP-MCNC: NEGATIVE MG/DL
PROTHROMBIN TIME: 13.1 SECONDS (ref 11.6–14.5)
QRS AXIS: -27 DEGREES
QRSD INTERVAL: 96 MS
QT INTERVAL: 364 MS
QTC INTERVAL: 422 MS
RBC # BLD AUTO: 5.26 MILLION/UL (ref 3.88–5.62)
SODIUM SERPL-SCNC: 139 MMOL/L (ref 136–145)
SP GR UR STRIP.AUTO: 1.02 (ref 1–1.03)
T WAVE AXIS: 31 DEGREES
UROBILINOGEN UR QL STRIP.AUTO: 0.2 E.U./DL
VENTRICULAR RATE: 81 BPM
WBC # BLD AUTO: 6.67 THOUSAND/UL (ref 4.31–10.16)

## 2019-10-31 PROCEDURE — 83036 HEMOGLOBIN GLYCOSYLATED A1C: CPT

## 2019-10-31 PROCEDURE — 85730 THROMBOPLASTIN TIME PARTIAL: CPT

## 2019-10-31 PROCEDURE — 85025 COMPLETE CBC W/AUTO DIFF WBC: CPT

## 2019-10-31 PROCEDURE — 81003 URINALYSIS AUTO W/O SCOPE: CPT | Performed by: NEUROLOGICAL SURGERY

## 2019-10-31 PROCEDURE — 93010 ELECTROCARDIOGRAM REPORT: CPT

## 2019-10-31 PROCEDURE — 80053 COMPREHEN METABOLIC PANEL: CPT

## 2019-10-31 PROCEDURE — 85610 PROTHROMBIN TIME: CPT

## 2019-10-31 PROCEDURE — 36415 COLL VENOUS BLD VENIPUNCTURE: CPT

## 2019-10-31 PROCEDURE — 93005 ELECTROCARDIOGRAM TRACING: CPT

## 2019-11-01 ENCOUNTER — OFFICE VISIT (OUTPATIENT)
Dept: FAMILY MEDICINE CLINIC | Facility: CLINIC | Age: 59
End: 2019-11-01
Payer: COMMERCIAL

## 2019-11-01 ENCOUNTER — ANESTHESIA EVENT (OUTPATIENT)
Dept: PERIOP | Facility: HOSPITAL | Age: 59
End: 2019-11-01
Payer: COMMERCIAL

## 2019-11-01 VITALS
HEIGHT: 75 IN | BODY MASS INDEX: 22.75 KG/M2 | HEART RATE: 76 BPM | SYSTOLIC BLOOD PRESSURE: 138 MMHG | WEIGHT: 183 LBS | DIASTOLIC BLOOD PRESSURE: 86 MMHG

## 2019-11-01 DIAGNOSIS — R05.9 COUGH: ICD-10-CM

## 2019-11-01 DIAGNOSIS — S23.3XXA SPRAIN OF UPPER BACK, INITIAL ENCOUNTER: ICD-10-CM

## 2019-11-01 DIAGNOSIS — M54.16 LUMBAR RADICULOPATHY: Primary | ICD-10-CM

## 2019-11-01 DIAGNOSIS — M21.371 RIGHT FOOT DROP: ICD-10-CM

## 2019-11-01 DIAGNOSIS — B35.4 TINEA CORPORIS: ICD-10-CM

## 2019-11-01 DIAGNOSIS — I10 ESSENTIAL HYPERTENSION: ICD-10-CM

## 2019-11-01 PROCEDURE — 3079F DIAST BP 80-89 MM HG: CPT | Performed by: FAMILY MEDICINE

## 2019-11-01 PROCEDURE — 3075F SYST BP GE 130 - 139MM HG: CPT | Performed by: FAMILY MEDICINE

## 2019-11-01 PROCEDURE — 99214 OFFICE O/P EST MOD 30 MIN: CPT | Performed by: FAMILY MEDICINE

## 2019-11-01 PROCEDURE — 3008F BODY MASS INDEX DOCD: CPT | Performed by: FAMILY MEDICINE

## 2019-11-01 RX ORDER — METHOCARBAMOL 750 MG/1
750 TABLET, FILM COATED ORAL EVERY 6 HOURS PRN
Qty: 30 TABLET | Refills: 1 | Status: SHIPPED | OUTPATIENT
Start: 2019-11-01 | End: 2019-11-01 | Stop reason: SDUPTHER

## 2019-11-01 RX ORDER — TRAMADOL HYDROCHLORIDE 50 MG/1
50 TABLET ORAL EVERY 6 HOURS PRN
Qty: 30 TABLET | Refills: 0 | Status: SHIPPED | OUTPATIENT
Start: 2019-11-01 | End: 2020-02-19

## 2019-11-01 RX ORDER — METHOCARBAMOL 750 MG/1
750 TABLET, FILM COATED ORAL EVERY 6 HOURS PRN
Qty: 30 TABLET | Refills: 1 | Status: SHIPPED | OUTPATIENT
Start: 2019-11-01 | End: 2020-02-19

## 2019-11-01 RX ORDER — CLOTRIMAZOLE AND BETAMETHASONE DIPROPIONATE 10; .64 MG/G; MG/G
CREAM TOPICAL 2 TIMES DAILY
Qty: 30 G | Refills: 1 | Status: SHIPPED | OUTPATIENT
Start: 2019-11-01 | End: 2020-02-19

## 2019-11-01 RX ORDER — TRAMADOL HYDROCHLORIDE 50 MG/1
50 TABLET ORAL EVERY 6 HOURS PRN
Qty: 30 TABLET | Refills: 0 | Status: SHIPPED | OUTPATIENT
Start: 2019-11-01 | End: 2019-11-01 | Stop reason: SDUPTHER

## 2019-11-01 NOTE — PATIENT INSTRUCTIONS
All lab nl, with cough suggest chest xray before surgery, meds refilled, given rx for Lotrisone, cleared for surgery pending chest xray

## 2019-11-01 NOTE — PROGRESS NOTES
Assessment and Plan:    Problem List Items Addressed This Visit     None                 Diagnoses and all orders for this visit:    Lumbar radiculopathy  -     traMADol (ULTRAM) 50 mg tablet; Take 1 tablet (50 mg total) by mouth every 6 (six) hours as needed for moderate pain    Sprain of upper back, initial encounter  -     methocarbamol (ROBAXIN) 750 mg tablet; Take 1 tablet (750 mg total) by mouth every 6 (six) hours as needed for muscle spasms for up to 14 days    Right foot drop  -     traMADol (ULTRAM) 50 mg tablet; Take 1 tablet (50 mg total) by mouth every 6 (six) hours as needed for moderate pain    Essential hypertension              Subjective:      Patient ID: Flor York is a 60 y o  male  CC:    Chief Complaint   Patient presents with    Back Pain     Pt c/o low back pain  He has surgery upcoming for L2-3 with Dr Zach Reinoso with [facility] 11/12/2019  Pt was given Tramadol at Urgent care and it works well  He was wondering if he could have a refill  kw    Rash     Pt has a rash on backside of thigh area  Pt states its itching and comes back from time to time  kw       HPI:    Here for rash-has upcoming surgery 11/12 at [facility] and would like pre-op, needs refill of tramadol and robaxin  Has no cold sx, no vomiting/diarrhea, no headaches,no cp, no shortness of breath, right now doesn't have someone to take him there yet      The following portions of the patient's history were reviewed and updated as appropriate: allergies, current medications, past family history, past medical history, past social history, past surgical history and problem list      Review of Systems   Constitutional: Positive for fatigue  Negative for activity change and appetite change  HENT: Negative for congestion, rhinorrhea and sinus pressure  Respiratory: Positive for cough  Negative for shortness of breath  Clear mucus   Cardiovascular: Negative for chest pain, palpitations and leg swelling  Genitourinary: Negative for difficulty urinating  Musculoskeletal: Positive for back pain and myalgias  Skin: Positive for rash  Neurological: Positive for weakness and numbness  Negative for headaches  Bilat foot drop, r thigh muscle weak   Psychiatric/Behavioral: Positive for sleep disturbance  The patient is nervous/anxious  Data to review:       Objective:    Vitals:    11/01/19 1433   BP: 138/86   BP Location: Left arm   Patient Position: Sitting   Pulse: 76   Weight: 83 kg (183 lb)   Height: 6' 3" (1 905 m)        Physical Exam   Constitutional: He appears well-developed and well-nourished  HENT:   Right Ear: Tympanic membrane normal    Left Ear: Tympanic membrane normal    Nose: No mucosal edema or rhinorrhea  Right sinus exhibits no maxillary sinus tenderness and no frontal sinus tenderness  Left sinus exhibits no maxillary sinus tenderness and no frontal sinus tenderness  Mouth/Throat: No oropharyngeal exudate, posterior oropharyngeal edema or posterior oropharyngeal erythema  Neck: No thyromegaly present  Cardiovascular: Normal rate, regular rhythm and normal heart sounds  Pulmonary/Chest: Effort normal and breath sounds normal    Abdominal: Soft  Bowel sounds are normal    Musculoskeletal: He exhibits tenderness  He exhibits no edema  bacl   Lymphadenopathy:     He has no cervical adenopathy  Skin:   C/w tinea corporis   Vitals reviewed

## 2019-11-04 ENCOUNTER — HOSPITAL ENCOUNTER (OUTPATIENT)
Dept: RADIOLOGY | Facility: HOSPITAL | Age: 59
Discharge: HOME | End: 2019-11-04
Payer: COMMERCIAL

## 2019-11-04 DIAGNOSIS — R05.9 COUGH: ICD-10-CM

## 2019-11-04 DIAGNOSIS — M54.16 LUMBAR RADICULOPATHY: ICD-10-CM

## 2019-11-04 DIAGNOSIS — M21.371 RIGHT FOOT DROP: ICD-10-CM

## 2019-11-04 PROCEDURE — 71046 X-RAY EXAM CHEST 2 VIEWS: CPT

## 2019-11-05 NOTE — PRE-PROCEDURE INSTRUCTIONS
Pre-Surgery Instructions:   Medication Instructions    amLODIPine (NORVASC) 2 5 mg tablet Instructed patient per Anesthesia Guidelines   clotrimazole-betamethasone (LOTRISONE) 1-0 05 % cream Instructed patient per Anesthesia Guidelines   methocarbamol (ROBAXIN) 750 mg tablet Instructed patient per Anesthesia Guidelines   tamsulosin (FLOMAX) 0 4 mg Instructed patient per Anesthesia Guidelines   traMADol (ULTRAM) 50 mg tablet Instructed patient per Anesthesia Guidelines   valACYclovir (VALTREX) 500 mg tablet Instructed patient per Anesthesia Guidelines  Before your operation, you play an important role in decreasing your risk for infection by washing with special antiseptic soap  This is an effective way to reduce bacteria on the skin which may help to prevent infections at the surgical site  Please read the following directions in advance  1  In the week before your operation purchase a 4 ounce bottle of antiseptic soap containing chlorhexidine gluconate 4%  Some brand names include: Aplicare, Endure, and Hibiclens  The cost is usually less than $5 00  · For your convenience, the [address] [place] [address] carries the soap  · It may also be available at your doctor's office or pre-admission testing center, and at most retail pharmacies  · If you are allergic or sensitive to soaps containing chlorhexidine gluconate (CHG), please let your doctor know so another antiseptic soap can be suggested  · CHG antiseptic soap is for external use only  2      The day before your operation follow these directions carefully to get ready  · Place clean lines (sheets) on your bed; you should sleep on clean sheets after your evening shower  · Get clean towels and washcloths ready - you need enough for 2 showers  · Set aside clean underwear, pajamas, and clothing to wear after the shower  Reminders:  · DO NOT use any other soap or body rinse on your skin during or after the antiseptic showers    · DO NOT use lotion , powder, deodorant, or perfume/aftershave of any kind on your skin after your antiseptic shower  · DO NOT shave any body parts in the 24 hours/the day before your operation  · DO NOT get the antiseptic soap in your eyes, ears, nose, mouth, or vaginal area  3      You will need to shower the night before AND the morning of your Surgery  Shower 1:  · The evening before your operation, take the fist shower  · First, shampoo your hair with regular shampoo and rinse it completely before you use the anitseptic soap  After washing and rinsing your hair, rinse your body  · Next, use a clean wash cloth to apply the antiseptic soap and wash your body from the neck down to your toes using 1/2 bottle of the antiseptic soap  You will use the other 1/2 bottle for the second shower  · Clean the area where your incision will be; later this area well for about 2 minutes  · If you ar having head or neck surgery, wash areas with the antiseptic soap  · Rinse yourself completely with running water  · Use a clean towel to dry off  · Wear clean underwear and clothing/pajamas  Shower 2:  · The Morning of your operation, take the second shower following the same steps as Shower 1 using the second 1/2 of the bottle of antiseptic soap  · Use clean cloths and towels to was and dry yourself off  · Wear clean underwear and clothing

## 2019-11-06 ENCOUNTER — TELEPHONE (OUTPATIENT)
Dept: FAMILY MEDICINE CLINIC | Facility: CLINIC | Age: 59
End: 2019-11-06

## 2019-11-11 ENCOUNTER — TELEPHONE (OUTPATIENT)
Dept: NEUROSURGERY | Facility: CLINIC | Age: 59
End: 2019-11-11

## 2019-11-11 ENCOUNTER — DOCUMENTATION (OUTPATIENT)
Dept: NEUROSURGERY | Facility: CLINIC | Age: 59
End: 2019-11-11

## 2019-11-11 DIAGNOSIS — G89.18 POSTOPERATIVE PAIN: ICD-10-CM

## 2019-11-11 DIAGNOSIS — Z98.890 POSTOPERATIVE STATE: Primary | ICD-10-CM

## 2019-11-11 RX ORDER — CEPHALEXIN 500 MG/1
500 CAPSULE ORAL EVERY 6 HOURS SCHEDULED
Qty: 12 CAPSULE | Refills: 0 | Status: SHIPPED | OUTPATIENT
Start: 2019-11-11 | End: 2019-11-14

## 2019-11-11 RX ORDER — OXYCODONE HYDROCHLORIDE AND ACETAMINOPHEN 5; 325 MG/1; MG/1
1 TABLET ORAL EVERY 4 HOURS PRN
Qty: 30 TABLET | Refills: 0 | Status: SHIPPED | OUTPATIENT
Start: 2019-11-11 | End: 2020-02-19

## 2019-11-11 NOTE — TELEPHONE ENCOUNTER
Pt called leaving a message that he had a question re his procedure in the AM  It is noted Meera placed her pre-op call at [phone]  Left a message for pt informing no need to call back just verifying his questions were answered, and encouraged return call if not

## 2019-11-12 ENCOUNTER — APPOINTMENT (OUTPATIENT)
Dept: RADIOLOGY | Facility: HOSPITAL | Age: 59
End: 2019-11-12
Payer: COMMERCIAL

## 2019-11-12 ENCOUNTER — HOSPITAL ENCOUNTER (OUTPATIENT)
Facility: HOSPITAL | Age: 59
Setting detail: OUTPATIENT SURGERY
Discharge: HOME | End: 2019-11-12
Attending: NEUROLOGICAL SURGERY | Admitting: NEUROLOGICAL SURGERY
Payer: COMMERCIAL

## 2019-11-12 ENCOUNTER — ANESTHESIA (OUTPATIENT)
Dept: PERIOP | Facility: HOSPITAL | Age: 59
End: 2019-11-12
Payer: COMMERCIAL

## 2019-11-12 VITALS
DIASTOLIC BLOOD PRESSURE: 79 MMHG | RESPIRATION RATE: 20 BRPM | SYSTOLIC BLOOD PRESSURE: 144 MMHG | OXYGEN SATURATION: 95 % | HEART RATE: 79 BPM | WEIGHT: 181 LBS | TEMPERATURE: 97.6 F | BODY MASS INDEX: 22.5 KG/M2 | HEIGHT: 75 IN

## 2019-11-12 DIAGNOSIS — M54.16 LUMBAR RADICULOPATHY: ICD-10-CM

## 2019-11-12 PROCEDURE — 72100 X-RAY EXAM L-S SPINE 2/3 VWS: CPT

## 2019-11-12 PROCEDURE — 63030 LAMOT DCMPRN NRV RT 1 LMBR: CPT | Performed by: NEUROLOGICAL SURGERY

## 2019-11-12 PROCEDURE — 88304 TISSUE EXAM BY PATHOLOGIST: CPT | Performed by: PATHOLOGY

## 2019-11-12 RX ORDER — OXYCODONE HYDROCHLORIDE AND ACETAMINOPHEN 5; 325 MG/1; MG/1
2 TABLET ORAL EVERY 4 HOURS PRN
Status: DISCONTINUED | OUTPATIENT
Start: 2019-11-12 | End: 2019-11-12 | Stop reason: HOSPADM

## 2019-11-12 RX ORDER — HYDROMORPHONE HCL/PF 1 MG/ML
0.5 SYRINGE (ML) INJECTION
Status: CANCELLED | OUTPATIENT
Start: 2019-11-12

## 2019-11-12 RX ORDER — SODIUM CHLORIDE, SODIUM LACTATE, POTASSIUM CHLORIDE, CALCIUM CHLORIDE 600; 310; 30; 20 MG/100ML; MG/100ML; MG/100ML; MG/100ML
75 INJECTION, SOLUTION INTRAVENOUS CONTINUOUS
Status: DISCONTINUED | OUTPATIENT
Start: 2019-11-12 | End: 2019-11-12 | Stop reason: HOSPADM

## 2019-11-12 RX ORDER — EPHEDRINE SULFATE 50 MG/ML
INJECTION INTRAVENOUS AS NEEDED
Status: DISCONTINUED | OUTPATIENT
Start: 2019-11-12 | End: 2019-11-12 | Stop reason: SURG

## 2019-11-12 RX ORDER — ONDANSETRON 2 MG/ML
INJECTION INTRAMUSCULAR; INTRAVENOUS AS NEEDED
Status: DISCONTINUED | OUTPATIENT
Start: 2019-11-12 | End: 2019-11-12 | Stop reason: SURG

## 2019-11-12 RX ORDER — MIDAZOLAM HYDROCHLORIDE 2 MG/2ML
INJECTION, SOLUTION INTRAMUSCULAR; INTRAVENOUS AS NEEDED
Status: DISCONTINUED | OUTPATIENT
Start: 2019-11-12 | End: 2019-11-12 | Stop reason: SURG

## 2019-11-12 RX ORDER — VANCOMYCIN HYDROCHLORIDE 1 G/20ML
INJECTION, POWDER, LYOPHILIZED, FOR SOLUTION INTRAVENOUS AS NEEDED
Status: DISCONTINUED | OUTPATIENT
Start: 2019-11-12 | End: 2019-11-12 | Stop reason: HOSPADM

## 2019-11-12 RX ORDER — CEFAZOLIN SODIUM 2 G/50ML
2000 SOLUTION INTRAVENOUS ONCE
Status: COMPLETED | OUTPATIENT
Start: 2019-11-12 | End: 2019-11-12

## 2019-11-12 RX ORDER — ROCURONIUM BROMIDE 10 MG/ML
INJECTION, SOLUTION INTRAVENOUS AS NEEDED
Status: DISCONTINUED | OUTPATIENT
Start: 2019-11-12 | End: 2019-11-12 | Stop reason: SURG

## 2019-11-12 RX ORDER — FENTANYL CITRATE/PF 50 MCG/ML
25 SYRINGE (ML) INJECTION
Status: CANCELLED | OUTPATIENT
Start: 2019-11-12

## 2019-11-12 RX ORDER — MAGNESIUM HYDROXIDE 1200 MG/15ML
LIQUID ORAL AS NEEDED
Status: DISCONTINUED | OUTPATIENT
Start: 2019-11-12 | End: 2019-11-12 | Stop reason: HOSPADM

## 2019-11-12 RX ORDER — BUPIVACAINE HYDROCHLORIDE 2.5 MG/ML
INJECTION, SOLUTION EPIDURAL; INFILTRATION; INTRACAUDAL AS NEEDED
Status: DISCONTINUED | OUTPATIENT
Start: 2019-11-12 | End: 2019-11-12 | Stop reason: HOSPADM

## 2019-11-12 RX ORDER — FENTANYL CITRATE 50 UG/ML
INJECTION, SOLUTION INTRAMUSCULAR; INTRAVENOUS AS NEEDED
Status: DISCONTINUED | OUTPATIENT
Start: 2019-11-12 | End: 2019-11-12 | Stop reason: SURG

## 2019-11-12 RX ORDER — LIDOCAINE HYDROCHLORIDE AND EPINEPHRINE 10; 10 MG/ML; UG/ML
INJECTION, SOLUTION INFILTRATION; PERINEURAL AS NEEDED
Status: DISCONTINUED | OUTPATIENT
Start: 2019-11-12 | End: 2019-11-12 | Stop reason: HOSPADM

## 2019-11-12 RX ORDER — LIDOCAINE HYDROCHLORIDE 20 MG/ML
INJECTION, SOLUTION EPIDURAL; INFILTRATION; INTRACAUDAL; PERINEURAL AS NEEDED
Status: DISCONTINUED | OUTPATIENT
Start: 2019-11-12 | End: 2019-11-12 | Stop reason: SURG

## 2019-11-12 RX ORDER — CHLORHEXIDINE GLUCONATE 0.12 MG/ML
15 RINSE ORAL ONCE
Status: COMPLETED | OUTPATIENT
Start: 2019-11-12 | End: 2019-11-12

## 2019-11-12 RX ORDER — PROPOFOL 10 MG/ML
INJECTION, EMULSION INTRAVENOUS AS NEEDED
Status: DISCONTINUED | OUTPATIENT
Start: 2019-11-12 | End: 2019-11-12 | Stop reason: SURG

## 2019-11-12 RX ADMIN — PHENYLEPHRINE HYDROCHLORIDE 100 MCG: 10 INJECTION INTRAVENOUS at 10:55

## 2019-11-12 RX ADMIN — LIDOCAINE HYDROCHLORIDE 5 ML: 20 INJECTION, SOLUTION EPIDURAL; INFILTRATION; INTRACAUDAL; PERINEURAL at 10:13

## 2019-11-12 RX ADMIN — CEFAZOLIN SODIUM 2000 MG: 2 SOLUTION INTRAVENOUS at 10:29

## 2019-11-12 RX ADMIN — ROCURONIUM BROMIDE 50 MG: 10 INJECTION INTRAVENOUS at 10:15

## 2019-11-12 RX ADMIN — MIDAZOLAM 2 MG: 1 INJECTION INTRAMUSCULAR; INTRAVENOUS at 10:06

## 2019-11-12 RX ADMIN — EPHEDRINE SULFATE 5 MG: 50 INJECTION, SOLUTION INTRAVENOUS at 10:59

## 2019-11-12 RX ADMIN — PHENYLEPHRINE HYDROCHLORIDE 100 MCG: 10 INJECTION INTRAVENOUS at 10:20

## 2019-11-12 RX ADMIN — ONDANSETRON 4 MG: 2 INJECTION INTRAMUSCULAR; INTRAVENOUS at 11:33

## 2019-11-12 RX ADMIN — EPHEDRINE SULFATE 10 MG: 50 INJECTION, SOLUTION INTRAVENOUS at 10:39

## 2019-11-12 RX ADMIN — CHLORHEXIDINE GLUCONATE 0.12% ORAL RINSE 15 ML: 1.2 LIQUID ORAL at 09:40

## 2019-11-12 RX ADMIN — EPHEDRINE SULFATE 5 MG: 50 INJECTION, SOLUTION INTRAVENOUS at 11:08

## 2019-11-12 RX ADMIN — OXYCODONE HYDROCHLORIDE AND ACETAMINOPHEN 2 TABLET: 5; 325 TABLET ORAL at 13:05

## 2019-11-12 RX ADMIN — PROPOFOL 50 MG: 10 INJECTION, EMULSION INTRAVENOUS at 10:14

## 2019-11-12 RX ADMIN — EPHEDRINE SULFATE 10 MG: 50 INJECTION, SOLUTION INTRAVENOUS at 10:18

## 2019-11-12 RX ADMIN — FENTANYL CITRATE 100 MCG: 50 INJECTION, SOLUTION INTRAMUSCULAR; INTRAVENOUS at 10:10

## 2019-11-12 RX ADMIN — SODIUM CHLORIDE, SODIUM LACTATE, POTASSIUM CHLORIDE, AND CALCIUM CHLORIDE: .6; .31; .03; .02 INJECTION, SOLUTION INTRAVENOUS at 11:21

## 2019-11-12 RX ADMIN — PROPOFOL 150 MG: 10 INJECTION, EMULSION INTRAVENOUS at 10:13

## 2019-11-12 RX ADMIN — EPHEDRINE SULFATE 5 MG: 50 INJECTION, SOLUTION INTRAVENOUS at 10:21

## 2019-11-12 RX ADMIN — SODIUM CHLORIDE, SODIUM LACTATE, POTASSIUM CHLORIDE, AND CALCIUM CHLORIDE 75 ML/HR: .6; .31; .03; .02 INJECTION, SOLUTION INTRAVENOUS at 09:40

## 2019-11-12 NOTE — OP NOTE
OPERATIVE REPORT  PATIENT NAME: Anai Huston    :  1960  MRN: [id number]  Pt Location: [facility] OR ROOM 03    SURGERY DATE: 2019    Surgeon(s) and Role:     * Mason Hoffman MD - Primary     * Jennifer Smith PA-C - Assisting    Preop Diagnosis:  Lumbar radiculopathy [M54 16]    Post-Op Diagnosis Codes:     * Lumbar radiculopathy [M54 16]    Procedure(s) (LRB):  LUMBAR LAMINOTOMY/FORAMINOTOMY/FACETECTOMY/DISCECTOMY L2-3, RIGHT (Right)    Specimen(s):  ID Type Source Tests Collected by Time Destination   1 : Disc right L2-3 Tissue Intervertebral Disc TISSUE EXAM Mason Hoffman MD 2019 1132        Estimated Blood Loss:   Minimal    Drains:  * No LDAs found *    Anesthesia Type:   General    Operative Indications:  Lumbar radiculopathy [M54 16]      Operative Findings:  See dictated note    Complications:   None    Procedure and Technique:  The patient was taken out the operative theatre and induced under general anesthesia intubated she was positioned prone on marta frame   We used anatomical landmarks to localize the midline lumbar incision over the L2-3 disc space  He has prepped and draped in sterile fashion   Incision with a 10  Blade, dissected down using cautery  We dissected down to expose the interlaminar space of L2-3  This was confirmed on fluoro       We then performed a laminotomy using the drill and Kathleen Buckner well as a medial facetectomy and laminotomy/foraminotomy of the L2-3 on the right lamina   We were able to remove the yellow ligament and expose the underlying thecal sac   We could see the downgoing L3 nerve root   Which was quite decompressed   We then retracted the thecal sac medially including the right L3 nerve root and exposed the L2-3 disc space  I was able to dissect out a large disc fragment  This was sent for pathology      I was then satisfied with decompression we obtained hemostasis and left some Depo-Medrol was in the epidural space   We then proceeded to close in layers with 0 Vicryl for the fascia  2-0 Vicryl for the subcutaneous tissues and 3-0 Prolene for the skin   A dressing was applied   The Patient was repositioned supine on a hospital bed and taken to the postop recovery area stable condition       All needle and sponge counts were correct at the end of the procedure     I was present for the entire procedure, A qualified resident physician was not available and A physician assistant was required during the procedure for retraction tissue handling,dissection and suturing    Patient Disposition:  PACU     SIGNATURE: Viktoriya Garvin MD  DATE: November 12, 2019  TIME: 11:47 AM

## 2019-11-12 NOTE — ANESTHESIA PREPROCEDURE EVALUATION
Review of Systems/Medical History  Patient summary reviewed  Chart reviewed  No history of anesthetic complications     Cardiovascular  EKG reviewed, Exercise tolerance (METS): >4,  Hypertension controlled,    Pulmonary  Negative pulmonary ROS        GI/Hepatic    GERD well controlled,        Prostatic disorder, benign prostatic hyperplasia       Endo/Other  Negative endo/other ROS      GYN  Negative gynecology ROS          Hematology  Negative hematology ROS      Musculoskeletal  Back pain , lumbar pain, Osteoarthritis,   Arthritis     Neurology    Neuromuscular disease , Motor deficit , right lower extremity weakness, Paresthesias,    Psychology   Anxiety,   Chronic opioid dependence Chronic pain,            Physical Exam    Airway    Mallampati score: I  TM Distance: >3 FB  Neck ROM: full     Dental   No notable dental hx     Cardiovascular  Rhythm: regular, Rate: normal, Cardiovascular exam normal    Pulmonary  Pulmonary exam normal     Other Findings        Anesthesia Plan  ASA Score- 2     Anesthesia Type- general with ASA Monitors  Additional Monitors:   Airway Plan: ETT  Comment: Pt with hx LE weakness/foot drop (pre-existing conditions)  Controlled GERD  Denies cardiac/pulm hx        Plan Factors-    Induction- intravenous and rapid sequence induction  Postoperative Plan- Plan for postoperative opioid use  Informed Consent- Anesthetic plan and risks discussed with patient  I personally reviewed this patient with the CRNA  Discussed and agreed on the Anesthesia Plan with the CRNA  cSott Vivar

## 2019-11-12 NOTE — DISCHARGE INSTRUCTIONS
Discharge Instructions  Lumbar decompression  (laminectomy/laminotomy/foraminotomy/discectomy)      Surgical incisional care:   Keep incision clean and dry  Avoid applying creams, lotion or antiseptic to incision area   Check the wound daily  If the incision becomes red, swollen, tender, warm, or has increased drainage please notify physician immediately   May shower 3 days after surgery, but do not soak in a tub and no swimming   o Incision may be cleaned with water and a mild antimicrobial soap using a clean washcloth  Incision is to be gently patted dry with a clean towel   Continue to change bed linens and pajamas more frequently  Wear clean clothes daily  Activity Restrictions:   No heavy lifting greater than 10lbs and no strenuous activities until cleared   No bending or twisting  No BLTs (bending, lifting, twisting)  Avoid pushing/pulling movements   May walk as tolerated  Encourage at least 4 short walks per day   No driving for at least 2 weeks or until cleared by physician  Postoperative medication:   Take pain medications to relieve incision pain, and muscle relaxants to prevent spasms as directed  Please see after visit summary (AVS) for details   Do not operate heavy machinery or vehicles while taking sedating medications   Use a bowel regimen while on opioids as they induce constipation  Ie  Senokot-S, Miralax, Colace, etc  Increase fiber and water intake   Do not take ibuprofen, Naproxen/Aleve or any NSAID until cleared by surgeon  May take Tylenol instead   If taking Coumadin, Aspirin, or Plavix, you may resume these medications when cleared by Neurosurgery  Follow-up as scheduled for a 2 week incision check  Follow-up as scheduled for 6 week postoperative visit       **Please notify MD if you experience a fever 101F, chills or have increased pain, numbness, tingling, or weakness in your legs and/or bowel/bladder dysfunction/incontinence**

## 2019-11-13 ENCOUNTER — TELEPHONE (OUTPATIENT)
Dept: NEUROSURGERY | Facility: CLINIC | Age: 59
End: 2019-11-13

## 2019-11-13 DIAGNOSIS — M21.372 BILATERAL FOOT-DROP: ICD-10-CM

## 2019-11-13 DIAGNOSIS — M21.371 BILATERAL FOOT-DROP: ICD-10-CM

## 2019-11-13 DIAGNOSIS — M21.372 LEFT FOOT DROP: ICD-10-CM

## 2019-11-13 DIAGNOSIS — M21.371 RIGHT FOOT DROP: Primary | ICD-10-CM

## 2019-11-13 NOTE — TELEPHONE ENCOUNTER
Spoke with Loretta Muñoz to see how he is doing after surgery yesterday  He reports that he is doing well overall and denies any incisional issues or fevers  Has requested two leg braces to help with ambulation as he has BL foot drop  Discussed order for Mafo braces and placed orders as requested  Advised that after three days he may take a shower and gently wash the surgical site with soap and water  Use clean wash cloth, towels, and clothing  Do not submerge in water until cleared by the surgeon  Do not apply any creams, ointments, or lotions to the site  Patient has moved his bowels since the surgery  Encouraged him to take a stool softener and educated to drink plenty of water daily  Also instructed to ambulate as tolerated to help with constipation and prevent blood clots  he has no further questions at this time  Verified date/time/location of his upcoming POV on 12/3/2019 and advised him to call the office with any further questions or concerns, or if any incisional issues or fevers would arise  Patient was appreciative for the call

## 2019-11-13 NOTE — TELEPHONE ENCOUNTER
1st attempt - [phone] on primary contact number after surgery yesterday to check in on recovery and provide post surgical instructions  Got voicemail, left message to callback  Will make another attempt if no callback is received

## 2019-11-25 ENCOUNTER — TELEPHONE (OUTPATIENT)
Dept: NEUROSURGERY | Facility: CLINIC | Age: 59
End: 2019-11-25

## 2019-11-25 NOTE — TELEPHONE ENCOUNTER
Pt reports he has been doing well up to approx 3 days ago  He is 13 days post-op  He is experiencing a return of some pre-op back symptoms but believes he may have been overdoing it  He has been pushing himself and doing the stairs approx 5 times/day  Reviewed activity limitations  Encourage limited stair use and encourage ambulation  He was encouraged to call back if symptoms worsen

## 2019-12-03 ENCOUNTER — CLINICAL SUPPORT (OUTPATIENT)
Dept: NEUROSURGERY | Facility: CLINIC | Age: 59
End: 2019-12-03

## 2019-12-03 VITALS — SYSTOLIC BLOOD PRESSURE: 154 MMHG | TEMPERATURE: 98.1 F | DIASTOLIC BLOOD PRESSURE: 92 MMHG

## 2019-12-03 DIAGNOSIS — Z98.890 POST-OPERATIVE STATE: Primary | ICD-10-CM

## 2019-12-03 PROCEDURE — 99024 POSTOP FOLLOW-UP VISIT: CPT

## 2019-12-03 NOTE — PROGRESS NOTES
Post-Op Visit-Neurosurgery    Jimmy Guevara 60 y o  male MRN: [id number]    Chief Complaint:  Patient presents post: LUMBAR LAMINOTOMY/FORAMINOTOMY/FACETECTOMY/DISCECTOMY L2-3, RIGHT (Right Spine Lumbar)    History of Present Illness:  Patient presents for 2 week POV for incision check  Patient ambulated with slight foot slap to exam room  Reports pain is 3-4/10, but he usually only takes pain medication at bedtime  Has met with Eliu Wang for BL replacement orthotic to help with foot drop gait  Has noticed a great deal of improvement in the numbness and tingle in his right leg since the surgery  Admits to overdoing it occasionally causing exacerbation of symptoms, but has since been taking it easy       Current Outpatient Medications:     amLODIPine (NORVASC) 2 5 mg tablet, Take 1 tablet (2 5 mg total) by mouth daily, Disp: 30 tablet, Rfl: 5    oxyCODONE-acetaminophen (PERCOCET) 5-325 mg per tablet, Take 1 tablet by mouth every 4 (four) hours as needed for moderate painMax Daily Amount: 6 tablets, Disp: 30 tablet, Rfl: 0    tamsulosin (FLOMAX) 0 4 mg, Take 1 capsule by mouth daily, Disp: , Rfl:     valACYclovir (VALTREX) 500 mg tablet, Take 500 mg by mouth daily , Disp: , Rfl: 3    clotrimazole-betamethasone (LOTRISONE) 1-0 05 % cream, Apply topically 2 (two) times a day (Patient not taking: Reported on 12/3/2019), Disp: 30 g, Rfl: 1    methocarbamol (ROBAXIN) 750 mg tablet, Take 1 tablet (750 mg total) by mouth every 6 (six) hours as needed for muscle spasms for up to 14 days, Disp: 30 tablet, Rfl: 1    traMADol (ULTRAM) 50 mg tablet, Take 1 tablet (50 mg total) by mouth every 6 (six) hours as needed for moderate pain (Patient not taking: Reported on 12/3/2019), Disp: 30 tablet, Rfl: 0   No Known Allergies       Assessment:    Vitals:    12/03/19 1326   BP: 154/92   BP Location: Right arm   Patient Position: Sitting   Temp: 98 1 °F (36 7 °C)   TempSrc: Tympanic      Pain Score:   4     Wound Exam: Incision is clean, dry, and in tact, well approximated, without heat, swelling, or drainage  Some local irritation along the suture line, this is expected to resolve now that sutures removed  See image below:         Procedure:  Staple/suture removal    Procedure Note: 1 running prolene sutures were removed  Patient Status: the patient tolerated the procedure well  Complications: None  Discussion/Summary  Reviewed incision care with patient including daily observation for s/s infection including: increased erythema, edema, drainage, dehiscence of incision or fever >101  Should these be observed, he understands that he is to call and/or return immediately for reassessment  Advised patient to continue cleansing area with mild soap and water and pat dry  Not to apply any lotions, creams, or ointments, & not to submerge in any water for 4  more weeks  He is to maintain activity restrictions until cleared by the surgeon  Activity levels were also reviewed with the patient in detail, he is to lift no greater than 10 pounds, advised to limit bend/twisting at the waist and ambulation is encouraged as tolerated  Verified date/time/location of upcoming POV on 12/13/2019  He is to call the office with any further questions or concerns, or if any incisional issues or fevers would arise

## 2019-12-13 ENCOUNTER — OFFICE VISIT (OUTPATIENT)
Dept: NEUROSURGERY | Facility: CLINIC | Age: 59
End: 2019-12-13

## 2019-12-13 VITALS
RESPIRATION RATE: 16 BRPM | WEIGHT: 181 LBS | DIASTOLIC BLOOD PRESSURE: 100 MMHG | HEIGHT: 75 IN | TEMPERATURE: 98.1 F | BODY MASS INDEX: 22.5 KG/M2 | SYSTOLIC BLOOD PRESSURE: 168 MMHG

## 2019-12-13 DIAGNOSIS — M54.16 LUMBAR RADICULOPATHY: Primary | ICD-10-CM

## 2019-12-13 PROCEDURE — 99024 POSTOP FOLLOW-UP VISIT: CPT | Performed by: NEUROLOGICAL SURGERY

## 2019-12-13 NOTE — PROGRESS NOTES
Assessment/Plan:    No problem-specific Assessment & Plan notes found for this encounter  Patient is 6 weeks postop from a lumbar decompression at L2-3 on the right doing quite well  No major complaints  He may followup PRN at this point  Diagnoses and all orders for this visit:    Lumbar radiculopathy          Subjective:      Patient ID: Dameon Martinez is a 60 y o  male  HPI  6 weeks post op from a lumbar decompression improved numbness and strength no pain  The following portions of the patient's history were reviewed and updated as appropriate: allergies, current medications, past family history, past medical history, past social history, past surgical history and problem list     Review of Systems   Musculoskeletal: Positive for back pain (low back pain right side, improving  Went back to work a week ago and after an hour driving feels discomfort) and gait problem  Right hip pain with prolonged sitting  Neurological: Positive for weakness (both feet and right leg )  Tingling right leg      All other systems reviewed and are negative  Objective:      /100 (BP Location: Left arm)   Temp 98 1 °F (36 7 °C) (Tympanic)   Resp 16   Ht 6' 3" (1 905 m)   Wt 82 1 kg (181 lb)   BMI 22 62 kg/m²          Physical Exam   Constitutional: He is oriented to person, place, and time  He appears well-developed  HENT:   Head: Normocephalic and atraumatic  Cardiovascular: Normal rate  Pulmonary/Chest: Effort normal    Neurological: He is alert and oriented to person, place, and time       incision well healed

## 2020-02-13 ENCOUNTER — APPOINTMENT (OUTPATIENT)
Dept: LAB | Facility: HOSPITAL | Age: 60
End: 2020-02-13
Payer: COMMERCIAL

## 2020-02-13 DIAGNOSIS — R05.9 COUGH: ICD-10-CM

## 2020-02-13 DIAGNOSIS — R09.82 POSTNASAL DRIP: ICD-10-CM

## 2020-02-13 PROCEDURE — 86003 ALLG SPEC IGE CRUDE XTRC EA: CPT

## 2020-02-13 PROCEDURE — 82785 ASSAY OF IGE: CPT

## 2020-02-13 PROCEDURE — 36415 COLL VENOUS BLD VENIPUNCTURE: CPT

## 2020-02-14 LAB
A ALTERNATA IGE QN: <0.1 KUA/I
A FUMIGATUS IGE QN: <0.1 KUA/I
ALLERGEN COMMENT: ABNORMAL
BERMUDA GRASS IGE QN: <0.1 KUA/I
BOXELDER IGE QN: <0.1 KUA/I
C HERBARUM IGE QN: <0.1 KUA/I
CAT DANDER IGE QN: <0.1 KUA/I
CMN PIGWEED IGE QN: <0.1 KUA/I
COMMON RAGWEED IGE QN: <0.1 KUA/I
COTTONWOOD IGE QN: <0.1 KUA/I
D FARINAE IGE QN: <0.1 KUA/I
D PTERONYSS IGE QN: <0.1 KUA/I
DOG DANDER IGE QN: <0.1 KUA/I
LONDON PLANE IGE QN: <0.1 KUA/I
MOUSE URINE PROT IGE QN: <0.1 KUA/I
MT JUNIPER IGE QN: <0.1 KUA/I
MUGWORT IGE QN: <0.1 KUA/I
P NOTATUM IGE QN: <0.1 KUA/I
ROACH IGE QN: 0.15 KUA/I
SHEEP SORREL IGE QN: <0.1 KUA/I
SILVER BIRCH IGE QN: <0.1 KUA/I
TIMOTHY IGE QN: <0.1 KUA/I
TOTAL IGE SMQN RAST: 52.7 KU/L (ref 0–113)
WALNUT IGE QN: <0.1 KUA/I
WHITE ASH IGE QN: <0.1 KUA/I
WHITE ELM IGE QN: <0.1 KUA/I
WHITE MULBERRY IGE QN: <0.1 KUA/I
WHITE OAK IGE QN: <0.1 KUA/I

## 2020-02-19 ENCOUNTER — OFFICE VISIT (OUTPATIENT)
Dept: FAMILY MEDICINE CLINIC | Facility: CLINIC | Age: 60
End: 2020-02-19
Payer: COMMERCIAL

## 2020-02-19 VITALS
HEIGHT: 75 IN | DIASTOLIC BLOOD PRESSURE: 80 MMHG | SYSTOLIC BLOOD PRESSURE: 132 MMHG | TEMPERATURE: 97.9 F | BODY MASS INDEX: 23.13 KG/M2 | RESPIRATION RATE: 18 BRPM | WEIGHT: 186 LBS | HEART RATE: 72 BPM

## 2020-02-19 DIAGNOSIS — J30.9 ALLERGIC RHINITIS, UNSPECIFIED SEASONALITY, UNSPECIFIED TRIGGER: Primary | ICD-10-CM

## 2020-02-19 DIAGNOSIS — I10 ESSENTIAL HYPERTENSION: ICD-10-CM

## 2020-02-19 DIAGNOSIS — H65.03 BILATERAL ACUTE SEROUS OTITIS MEDIA, RECURRENCE NOT SPECIFIED: ICD-10-CM

## 2020-02-19 DIAGNOSIS — R42 DIZZINESS: ICD-10-CM

## 2020-02-19 DIAGNOSIS — H83.03 LABYRINTHITIS OF BOTH EARS: ICD-10-CM

## 2020-02-19 DIAGNOSIS — H69.80 DYSFUNCTION OF EUSTACHIAN TUBE, UNSPECIFIED LATERALITY: ICD-10-CM

## 2020-02-19 PROBLEM — H69.90 EUSTACHIAN TUBE DYSFUNCTION: Status: ACTIVE | Noted: 2020-02-19

## 2020-02-19 PROCEDURE — 99214 OFFICE O/P EST MOD 30 MIN: CPT | Performed by: FAMILY MEDICINE

## 2020-02-19 PROCEDURE — 3008F BODY MASS INDEX DOCD: CPT | Performed by: FAMILY MEDICINE

## 2020-02-19 PROCEDURE — 3079F DIAST BP 80-89 MM HG: CPT | Performed by: FAMILY MEDICINE

## 2020-02-19 PROCEDURE — 1036F TOBACCO NON-USER: CPT | Performed by: FAMILY MEDICINE

## 2020-02-19 PROCEDURE — 3075F SYST BP GE 130 - 139MM HG: CPT | Performed by: FAMILY MEDICINE

## 2020-02-19 RX ORDER — MONTELUKAST SODIUM 10 MG/1
TABLET ORAL
Qty: 30 TABLET | Refills: 5 | Status: SHIPPED | OUTPATIENT
Start: 2020-02-19 | End: 2020-09-07

## 2020-02-19 RX ORDER — PREDNISONE 10 MG/1
10 TABLET ORAL DAILY
Qty: 7 TABLET | Refills: 0 | Status: SHIPPED | OUTPATIENT
Start: 2020-02-19 | End: 2020-02-26

## 2020-02-19 NOTE — PROGRESS NOTES
Assessment and Plan:  1  Allergic rhinitis, singular ordered  2  Eustachian tube dysfunction, as above, prednisone ordered  3  Acute serous otitis media, as above prednisone ordered  4  Acute labyrinthitis, prednisone ordered  5  Dizziness, as above  Patient declines meclizine prescription  6  Hypertension, stable continue present therapy  7  Return 12 in 1 week if still symptoms  If worsen report to [facility] Emergency Department        Problem List Items Addressed This Visit        Respiratory    Allergic rhinitis - Primary     Singular prescribed         Relevant Medications    montelukast (SINGULAIR) 10 mg tablet    predniSONE 10 mg tablet       Cardiovascular and Mediastinum    Essential hypertension     Stable continue present therapy            Nervous and Auditory    Eustachian tube dysfunction     Singular prescribed prednisone prescribed         Relevant Medications    montelukast (SINGULAIR) 10 mg tablet    predniSONE 10 mg tablet      Other Visit Diagnoses     Bilateral acute serous otitis media, recurrence not specified        Relevant Medications    predniSONE 10 mg tablet    Labyrinthitis of both ears        Relevant Medications    predniSONE 10 mg tablet    Dizziness        Relevant Medications    predniSONE 10 mg tablet                 Diagnoses and all orders for this visit:    Allergic rhinitis, unspecified seasonality, unspecified trigger  -     montelukast (SINGULAIR) 10 mg tablet; Take 1 tablet daily for allergy  -     predniSONE 10 mg tablet; Take 1 tablet (10 mg total) by mouth daily for 7 days    Dysfunction of Eustachian tube, unspecified laterality  -     montelukast (SINGULAIR) 10 mg tablet; Take 1 tablet daily for allergy  -     predniSONE 10 mg tablet; Take 1 tablet (10 mg total) by mouth daily for 7 days    Bilateral acute serous otitis media, recurrence not specified  -     predniSONE 10 mg tablet;  Take 1 tablet (10 mg total) by mouth daily for 7 days    Labyrinthitis of both ears  -     predniSONE 10 mg tablet; Take 1 tablet (10 mg total) by mouth daily for 7 days    Dizziness  -     predniSONE 10 mg tablet; Take 1 tablet (10 mg total) by mouth daily for 7 days    Essential hypertension              Subjective:      Patient ID: Samara Stewart is a 63 y o  male  CC:    Chief Complaint   Patient presents with    Dizziness     Patient present today for dizziness to the point he almost fainted  per pt it happened this AM whe he was doing push ups  HPI:    Patient noticed over the weekend he turned his head and bedding get very dizzy  Has had mild head congestion since that time no tracy headache no fever chills no syncope near syncope no chest pain shortness of breath  Patient did notice that he is doing as pushups today he got extremely dizzy  Marleta Press He did note that he had to stop because he felt like he was going to faint patient did not faint      The following portions of the patient's history were reviewed and updated as appropriate: allergies, current medications, past family history, past medical history, past social history, past surgical history and problem list       Review of Systems   Constitutional: Negative for chills and fever  HENT:        HPI patient states left ear felt full  No otalgia or otorrhea   Eyes: Negative  Respiratory: Negative  Cardiovascular: Negative  Gastrointestinal: Negative  Endocrine: Negative  Genitourinary: Negative  Musculoskeletal: Negative  Skin: Negative  Allergic/Immunologic: Positive for environmental allergies  Neurological:        HPI   Hematological: Negative  Psychiatric/Behavioral: Negative            Data to review:       Objective:    Vitals:    02/19/20 0959   BP: 132/80   BP Location: Left arm   Patient Position: Sitting   Cuff Size: Large   Pulse: 72   Resp: 18   Temp: 97 9 °F (36 6 °C)   TempSrc: Temporal   Weight: 84 4 kg (186 lb)   Height: 6' 3" (1 905 m)        Physical Exam Constitutional: He is oriented to person, place, and time  He appears well-developed and well-nourished  HENT:   Head: Normocephalic and atraumatic  Mouth/Throat: No oropharyngeal exudate  Both tympanic membranes dull with fluid no injection positive allergic turbinates positive clear postnasal drip negative pharyngeal injection or exudate   Eyes: Pupils are equal, round, and reactive to light  Conjunctivae and EOM are normal  No scleral icterus  Neck: Neck supple  No JVD present  No tracheal deviation present  No thyromegaly present  Negative carotid artery bruit   Cardiovascular: Normal rate, regular rhythm and normal heart sounds  Pulmonary/Chest: Effort normal and breath sounds normal    Abdominal: Soft  Bowel sounds are normal  There is no tenderness  Musculoskeletal: He exhibits no edema  Lymphadenopathy:     He has no cervical adenopathy  Neurological: He is alert and oriented to person, place, and time  He displays normal reflexes  No cranial nerve deficit or sensory deficit  He exhibits normal muscle tone  Coordination normal    Romberg negative   Skin: Skin is warm and dry  Psychiatric: He has a normal mood and affect

## 2020-02-19 NOTE — PATIENT INSTRUCTIONS
Patient to return in 1 week if still symptoms    If symptoms worsen report to [facility] Emergency Department

## 2020-03-02 ENCOUNTER — OFFICE VISIT (OUTPATIENT)
Dept: FAMILY MEDICINE CLINIC | Facility: CLINIC | Age: 60
End: 2020-03-02
Payer: COMMERCIAL

## 2020-03-02 VITALS
HEART RATE: 80 BPM | DIASTOLIC BLOOD PRESSURE: 92 MMHG | BODY MASS INDEX: 23.13 KG/M2 | WEIGHT: 186 LBS | SYSTOLIC BLOOD PRESSURE: 142 MMHG | HEIGHT: 75 IN

## 2020-03-02 DIAGNOSIS — C67.9 MALIGNANT NEOPLASM OF URINARY BLADDER, UNSPECIFIED SITE (HCC): ICD-10-CM

## 2020-03-02 DIAGNOSIS — N52.9 ERECTILE DYSFUNCTION, UNSPECIFIED ERECTILE DYSFUNCTION TYPE: ICD-10-CM

## 2020-03-02 DIAGNOSIS — E78.5 HYPERLIPIDEMIA, UNSPECIFIED HYPERLIPIDEMIA TYPE: ICD-10-CM

## 2020-03-02 DIAGNOSIS — N41.1 CHRONIC PROSTATITIS: ICD-10-CM

## 2020-03-02 DIAGNOSIS — N40.1 BENIGN NODULAR PROSTATIC HYPERPLASIA WITH LOWER URINARY TRACT SYMPTOMS: ICD-10-CM

## 2020-03-02 DIAGNOSIS — I10 ESSENTIAL HYPERTENSION: Primary | ICD-10-CM

## 2020-03-02 DIAGNOSIS — Z11.4 SCREENING FOR HIV (HUMAN IMMUNODEFICIENCY VIRUS): ICD-10-CM

## 2020-03-02 PROCEDURE — 1036F TOBACCO NON-USER: CPT | Performed by: FAMILY MEDICINE

## 2020-03-02 PROCEDURE — 3008F BODY MASS INDEX DOCD: CPT | Performed by: FAMILY MEDICINE

## 2020-03-02 PROCEDURE — 3080F DIAST BP >= 90 MM HG: CPT | Performed by: FAMILY MEDICINE

## 2020-03-02 PROCEDURE — 3077F SYST BP >= 140 MM HG: CPT | Performed by: FAMILY MEDICINE

## 2020-03-02 PROCEDURE — 99214 OFFICE O/P EST MOD 30 MIN: CPT | Performed by: FAMILY MEDICINE

## 2020-03-02 RX ORDER — TADALAFIL 5 MG/1
5 TABLET ORAL DAILY
Qty: 30 TABLET | Refills: 5 | Status: SHIPPED | OUTPATIENT
Start: 2020-03-02 | End: 2021-04-07 | Stop reason: ALTCHOICE

## 2020-03-02 NOTE — PATIENT INSTRUCTIONS
Complete blood work as ordered  Recheck 2 weeks    Monitor for interaction between Cialis and Flomax may lower blood pressure

## 2020-03-02 NOTE — PROGRESS NOTES
Assessment and Plan:  1  BPH/chronic prostatitis common digital rectal exam was completed today  Blood work including a urine with culture was ordered  2  Erectile dysfunction  Patient did try Viagra gave me headache  We will use Cialis 5 mg daily  To treat this and above  Discussed Flomax and Cialis  Patient should monitor himself for low blood pressure reaction  3  Hypertension, blood pressure mildly elevated will recheck in 2 weeks  4  Hyperlipidemia, blood work is ordered  5  History of bladder cancer patient follows with Urology in [facility]  6  Return in 2 weeks sooner if needed  7  Patient requested HIV test    Problem List Items Addressed This Visit        Cardiovascular and Mediastinum    Essential hypertension - Primary     Blood pressure mildly elevated will recheck in 2 weeks         Relevant Orders    CBC    Comprehensive metabolic panel    Lipid Panel with Direct LDL reflex    TSH, 3rd generation with Free T4 reflex    UA (URINE) with reflex to Scope    Testosterone    Urine culture       Genitourinary    BPH     Will start Cialis daily dose         Relevant Medications    tadalafil (CIALIS) 5 MG tablet    Other Relevant Orders    CBC    Comprehensive metabolic panel    Lipid Panel with Direct LDL reflex    TSH, 3rd generation with Free T4 reflex    UA (URINE) with reflex to Scope    Testosterone    Urine culture    History of bladder cancer     Patient follows with his urologist         Relevant Medications    tadalafil (CIALIS) 5 MG tablet    Other Relevant Orders    CBC    Comprehensive metabolic panel    Lipid Panel with Direct LDL reflex    TSH, 3rd generation with Free T4 reflex    UA (URINE) with reflex to Scope    Testosterone    Urine culture    Chronic prostatitis     Digital rectal exam benign    Urinalysis is ordered patient follow-up with his urologist         Relevant Orders    CBC    Comprehensive metabolic panel    Lipid Panel with Direct LDL reflex    TSH, 3rd generation with Free T4 reflex    UA (URINE) with reflex to Scope    Testosterone    Urine culture       Other    Hyperlipidemia     Blood work ordered         Relevant Orders    CBC    Comprehensive metabolic panel    Lipid Panel with Direct LDL reflex    TSH, 3rd generation with Free T4 reflex    UA (URINE) with reflex to Scope    Testosterone    Urine culture    Erectile dysfunction     Cialis 5 mg daily was ordered blood work to include testosterone is ordered         Relevant Medications    tadalafil (CIALIS) 5 MG tablet    Other Relevant Orders    CBC    Comprehensive metabolic panel    Lipid Panel with Direct LDL reflex    TSH, 3rd generation with Free T4 reflex    UA (URINE) with reflex to Scope    Testosterone    Urine culture      Other Visit Diagnoses     Screening for HIV (human immunodeficiency virus)        Relevant Orders    [facility] HIV 1/2 AG-AB combo                 Diagnoses and all orders for this visit:    Essential hypertension  -     CBC; Future  -     Comprehensive metabolic panel; Future  -     Lipid Panel with Direct LDL reflex; Future  -     TSH, 3rd generation with Free T4 reflex; Future  -     UA (URINE) with reflex to Scope; Future  -     Testosterone; Future  -     Urine culture; Future    BPH  -     CBC; Future  -     Comprehensive metabolic panel; Future  -     Lipid Panel with Direct LDL reflex; Future  -     TSH, 3rd generation with Free T4 reflex; Future  -     UA (URINE) with reflex to Scope; Future  -     Testosterone; Future  -     Urine culture; Future  -     tadalafil (CIALIS) 5 MG tablet; Take 1 tablet (5 mg total) by mouth daily    Malignant neoplasm of urinary bladder, unspecified site (HCC)  -     CBC; Future  -     Comprehensive metabolic panel; Future  -     Lipid Panel with Direct LDL reflex; Future  -     TSH, 3rd generation with Free T4 reflex; Future  -     UA (URINE) with reflex to Scope; Future  -     Testosterone; Future  -     Urine culture;  Future    Chronic prostatitis  -     CBC; Future  -     Comprehensive metabolic panel; Future  -     Lipid Panel with Direct LDL reflex; Future  -     TSH, 3rd generation with Free T4 reflex; Future  -     UA (URINE) with reflex to Scope; Future  -     Testosterone; Future  -     Urine culture; Future    Hyperlipidemia, unspecified hyperlipidemia type  -     CBC; Future  -     Comprehensive metabolic panel; Future  -     Lipid Panel with Direct LDL reflex; Future  -     TSH, 3rd generation with Free T4 reflex; Future  -     UA (URINE) with reflex to Scope; Future  -     Testosterone; Future  -     Urine culture; Future    Erectile dysfunction, unspecified erectile dysfunction type  -     CBC; Future  -     Comprehensive metabolic panel; Future  -     Lipid Panel with Direct LDL reflex; Future  -     TSH, 3rd generation with Free T4 reflex; Future  -     UA (URINE) with reflex to Scope; Future  -     Testosterone; Future  -     Urine culture; Future  -     tadalafil (CIALIS) 5 MG tablet; Take 1 tablet (5 mg total) by mouth daily    Screening for HIV (human immunodeficiency virus)  -     [facility] HIV 1/2 AG-AB combo; Future              Subjective:      Patient ID: Ramon Zambrano is a 63 y o  male  CC:    Chief Complaint   Patient presents with    Other     c/o pain in prostate  He said he has a Urologist in [place]  [facility]       HPI:    Patient told the front does he had ear pain but this is not the case  Patient has trouble with erectile problems  He has trouble getting an erection  When he does he has very short duration of erection  Patient did try a Viagra in the past and gave may headache  Patient does see Urology, and work spray for prostatitis and BPH        The following portions of the patient's history were reviewed and updated as appropriate: allergies, current medications, past family history, past medical history, past social history, past surgical history and problem list       Review of Systems Constitutional: Negative  HENT: Negative  Eyes: Negative  Respiratory: Negative  Cardiovascular: Negative  Gastrointestinal: Negative  Endocrine: Negative  Genitourinary:        HPI   Musculoskeletal: Negative  Skin: Negative  Allergic/Immunologic: Negative  Neurological: Negative  Hematological: Negative  Psychiatric/Behavioral: Negative  Data to review:       Objective:    Vitals:    03/02/20 1512   BP: 142/92   Pulse: 80   Weight: 84 4 kg (186 lb)   Height: 6' 3" (1 905 m)        Physical Exam   Constitutional: He is oriented to person, place, and time  He appears well-developed and well-nourished  HENT:   Head: Normocephalic and atraumatic  Mouth/Throat: Oropharynx is clear and moist    Eyes: No scleral icterus  Neck: Neck supple  No JVD present  Cardiovascular: Normal rate, regular rhythm and normal heart sounds  Pulmonary/Chest: Effort normal and breath sounds normal    Abdominal: Soft  Bowel sounds are normal  There is no tenderness  Genitourinary: Rectum normal and penis normal  No penile tenderness  Genitourinary Comments: Prostate mildly enlarged +2 over 4-induration negative nodules   Musculoskeletal: He exhibits no edema, tenderness or deformity  Neurological: He is alert and oriented to person, place, and time  No cranial nerve deficit  Skin: Skin is warm and dry  Psychiatric: He has a normal mood and affect

## 2020-04-08 ENCOUNTER — TELEMEDICINE (OUTPATIENT)
Dept: FAMILY MEDICINE CLINIC | Facility: CLINIC | Age: 60
End: 2020-04-08
Payer: COMMERCIAL

## 2020-04-08 DIAGNOSIS — H66.90 ACUTE OTITIS MEDIA, UNSPECIFIED OTITIS MEDIA TYPE: ICD-10-CM

## 2020-04-08 DIAGNOSIS — H69.80 DYSFUNCTION OF EUSTACHIAN TUBE, UNSPECIFIED LATERALITY: ICD-10-CM

## 2020-04-08 DIAGNOSIS — I10 ESSENTIAL HYPERTENSION: ICD-10-CM

## 2020-04-08 DIAGNOSIS — J01.00 ACUTE MAXILLARY SINUSITIS, RECURRENCE NOT SPECIFIED: Primary | ICD-10-CM

## 2020-04-08 DIAGNOSIS — J30.9 ALLERGIC RHINITIS, UNSPECIFIED SEASONALITY, UNSPECIFIED TRIGGER: ICD-10-CM

## 2020-04-08 DIAGNOSIS — H92.09 OTALGIA, UNSPECIFIED LATERALITY: ICD-10-CM

## 2020-04-08 PROCEDURE — 99212 OFFICE O/P EST SF 10 MIN: CPT | Performed by: FAMILY MEDICINE

## 2020-04-08 RX ORDER — CEPHALEXIN 500 MG/1
CAPSULE ORAL
Qty: 21 CAPSULE | Refills: 0 | Status: SHIPPED | OUTPATIENT
Start: 2020-04-08 | End: 2020-04-15

## 2020-04-08 RX ORDER — PREDNISONE 20 MG/1
TABLET ORAL
Qty: 20 TABLET | Refills: 0 | Status: SHIPPED | OUTPATIENT
Start: 2020-04-08 | End: 2020-04-18

## 2020-04-20 ENCOUNTER — TELEPHONE (OUTPATIENT)
Dept: FAMILY MEDICINE CLINIC | Facility: CLINIC | Age: 60
End: 2020-04-20

## 2020-04-20 ENCOUNTER — TELEMEDICINE (OUTPATIENT)
Dept: FAMILY MEDICINE CLINIC | Facility: CLINIC | Age: 60
End: 2020-04-20
Payer: COMMERCIAL

## 2020-04-20 DIAGNOSIS — H66.90 ACUTE OTITIS MEDIA, UNSPECIFIED OTITIS MEDIA TYPE: ICD-10-CM

## 2020-04-20 DIAGNOSIS — J30.9 ALLERGIC RHINITIS, UNSPECIFIED SEASONALITY, UNSPECIFIED TRIGGER: ICD-10-CM

## 2020-04-20 DIAGNOSIS — R42 LIGHTHEADED: ICD-10-CM

## 2020-04-20 DIAGNOSIS — J01.00 ACUTE MAXILLARY SINUSITIS, RECURRENCE NOT SPECIFIED: Primary | ICD-10-CM

## 2020-04-20 DIAGNOSIS — H92.09 OTALGIA, UNSPECIFIED LATERALITY: ICD-10-CM

## 2020-04-20 DIAGNOSIS — H69.80 DYSFUNCTION OF EUSTACHIAN TUBE, UNSPECIFIED LATERALITY: ICD-10-CM

## 2020-04-20 DIAGNOSIS — I10 ESSENTIAL HYPERTENSION: ICD-10-CM

## 2020-04-20 PROCEDURE — 99212 OFFICE O/P EST SF 10 MIN: CPT | Performed by: FAMILY MEDICINE

## 2020-04-20 RX ORDER — PREDNISONE 20 MG/1
TABLET ORAL
Qty: 20 TABLET | Refills: 0 | Status: SHIPPED | OUTPATIENT
Start: 2020-04-20 | End: 2020-04-30

## 2020-04-20 RX ORDER — AZELASTINE 1 MG/ML
2 SPRAY, METERED NASAL 2 TIMES DAILY
Qty: 30 ML | Refills: 3 | Status: SHIPPED | OUTPATIENT
Start: 2020-04-20 | End: 2021-04-07 | Stop reason: ALTCHOICE

## 2020-04-20 RX ORDER — SULFAMETHOXAZOLE AND TRIMETHOPRIM 800; 160 MG/1; MG/1
1 TABLET ORAL EVERY 12 HOURS SCHEDULED
Qty: 14 TABLET | Refills: 0 | Status: SHIPPED | OUTPATIENT
Start: 2020-04-20 | End: 2020-04-27

## 2020-05-05 ENCOUNTER — TELEPHONE (OUTPATIENT)
Dept: FAMILY MEDICINE CLINIC | Facility: CLINIC | Age: 60
End: 2020-05-05

## 2020-05-05 DIAGNOSIS — H69.80 DYSFUNCTION OF EUSTACHIAN TUBE, UNSPECIFIED LATERALITY: Primary | ICD-10-CM

## 2020-05-05 DIAGNOSIS — R42 DIZZINESS: ICD-10-CM

## 2020-05-12 ENCOUNTER — TELEMEDICINE (OUTPATIENT)
Dept: FAMILY MEDICINE CLINIC | Facility: CLINIC | Age: 60
End: 2020-05-12
Payer: COMMERCIAL

## 2020-05-12 ENCOUNTER — HOSPITAL ENCOUNTER (OUTPATIENT)
Dept: CT IMAGING | Facility: HOSPITAL | Age: 60
Discharge: HOME | End: 2020-05-12
Payer: COMMERCIAL

## 2020-05-12 ENCOUNTER — TELEPHONE (OUTPATIENT)
Dept: FAMILY MEDICINE CLINIC | Facility: CLINIC | Age: 60
End: 2020-05-12

## 2020-05-12 VITALS — HEIGHT: 75 IN | BODY MASS INDEX: 23.62 KG/M2 | WEIGHT: 190 LBS | HEART RATE: 62 BPM

## 2020-05-12 DIAGNOSIS — J30.9 ALLERGIC RHINITIS, UNSPECIFIED SEASONALITY, UNSPECIFIED TRIGGER: ICD-10-CM

## 2020-05-12 DIAGNOSIS — J32.0 CHRONIC MAXILLARY SINUSITIS: ICD-10-CM

## 2020-05-12 DIAGNOSIS — R51.9 LEFT FACIAL PAIN: ICD-10-CM

## 2020-05-12 DIAGNOSIS — R42 DIZZINESS: Primary | ICD-10-CM

## 2020-05-12 DIAGNOSIS — H69.80 DYSFUNCTION OF EUSTACHIAN TUBE, UNSPECIFIED LATERALITY: ICD-10-CM

## 2020-05-12 DIAGNOSIS — R42 DIZZINESS: ICD-10-CM

## 2020-05-12 PROCEDURE — 70450 CT HEAD/BRAIN W/O DYE: CPT

## 2020-05-12 PROCEDURE — 3008F BODY MASS INDEX DOCD: CPT | Performed by: FAMILY MEDICINE

## 2020-05-12 PROCEDURE — 99214 OFFICE O/P EST MOD 30 MIN: CPT | Performed by: FAMILY MEDICINE

## 2020-05-12 RX ORDER — PREDNISONE 20 MG/1
TABLET ORAL
Qty: 20 TABLET | Refills: 0 | Status: SHIPPED | OUTPATIENT
Start: 2020-05-12 | End: 2020-05-22

## 2020-05-12 RX ORDER — AMOXICILLIN AND CLAVULANATE POTASSIUM 875; 125 MG/1; MG/1
1 TABLET, FILM COATED ORAL EVERY 12 HOURS SCHEDULED
Qty: 20 TABLET | Refills: 0 | Status: SHIPPED | OUTPATIENT
Start: 2020-05-12 | End: 2020-05-22

## 2020-07-04 ENCOUNTER — TELEPHONE (OUTPATIENT)
Dept: OTHER | Facility: OTHER | Age: 60
End: 2020-07-04

## 2020-07-04 DIAGNOSIS — S23.3XXA SPRAIN OF UPPER BACK, INITIAL ENCOUNTER: Primary | ICD-10-CM

## 2020-07-04 RX ORDER — METHYLPREDNISOLONE 4 MG/1
TABLET ORAL
Qty: 1 EACH | Refills: 0 | Status: SHIPPED | OUTPATIENT
Start: 2020-07-04 | End: 2021-04-07 | Stop reason: ALTCHOICE

## 2020-07-04 NOTE — PROGRESS NOTES
Patient developed acute back pain on Thursday, 2/2 increased activity  Denies BLE symptoms, UI, BI, or saddle anesthesia  Taking ibuprofen and robaxin, has tried heat and ice  Unable to stand or walk 2/2 back pain    Called in medrol dose pack  Continue robaxin, ibuprofen, and heat  Proceed to ED if symptoms worsen  Follow up in the office next week, will route message to Ezekiel Agarwal to follow up with patient

## 2020-07-06 NOTE — PROGRESS NOTES
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 , sitting for long periods 1 hour before a break  started with back spasms  Lumbar area  Severity 8 5/10  Called [facility] started regimen of  Ibuprofen , methocarbamol , and steroid  Pain improved  5-6/10 , cancelled shift for tomorrow  Reports had not worked for 4 months  Advised to continue regimen  Call if pain/spasm exacerbation and no continued improivement, return to baseline  11/12/2020 surgery LUMBAR LAMINOTOMY/FORAMINOTOMY/FACETECTOMY/DISCECTOMY L2-3, RIGHT (Right Spine Lumbar)    12/13/2019  6 week PO visit excerpt form notes  "doing quite well  No major complaints"  F/U prn  ROS:Musculoskeletal: Positive for back pain (low back pain right side, improving  Went back to work a week ago and after an hour driving feels discomfort) and gait problem  Right hip pain with prolonged sitting  Positive weakness tingling  Both feet and right leg

## 2020-07-14 ENCOUNTER — APPOINTMENT (OUTPATIENT)
Dept: LAB | Facility: HOSPITAL | Age: 60
End: 2020-07-14
Payer: COMMERCIAL

## 2020-07-14 ENCOUNTER — TRANSCRIBE ORDERS (OUTPATIENT)
Dept: ADMINISTRATIVE | Facility: HOSPITAL | Age: 60
End: 2020-07-14

## 2020-07-14 ENCOUNTER — TELEPHONE (OUTPATIENT)
Dept: NEUROSURGERY | Facility: CLINIC | Age: 60
End: 2020-07-14

## 2020-07-14 DIAGNOSIS — N42.9 DISEASE OF PROSTATE: Primary | ICD-10-CM

## 2020-07-14 DIAGNOSIS — N42.9 DISEASE OF PROSTATE: ICD-10-CM

## 2020-07-14 LAB — PSA SERPL-MCNC: 1.3 NG/ML (ref 0–4)

## 2020-07-14 PROCEDURE — 84153 ASSAY OF PSA TOTAL: CPT

## 2020-07-14 NOTE — TELEPHONE ENCOUNTER
Patient as a f/u to 7/4 note  And message left Friday thet he continued with discomfort afte steroid completion  He reports complete steroids last week, continued treatment with Advil , methocarbamol  Hs decreased activity level, resting , holding on driving   Job required distance driving and sitting for efren periods 1 5 -2 hr interval without rest or standing  Is much better than onset, no longer requiring antispasmodic , no muscle spasms  Is decreasing advil use  Is standing upright and walking without severe pain and discomfort as previously  Is satisfied with recovery, does not feel addition intervention indicated at this time  Has bilateral drop foot , experiencing intermittent episodes of feet feeling hot  Is considering applying for social security disability and will need records  Assured we can provide records but do not otherwise involved in disability  Suggest a functional capacity evaluation by Dr Carmencita Aguilera , would like to hold on eferral at this time  Encouraged to contact office with additional questions or concerns   He was appreciative of call, thanked me

## 2020-08-17 NOTE — CONSULTS
08/17/20 1456   Subjective Comments   Subjective Comments Pt visiting  during OT/PT cotreat attempt @ 2:15.    Plan   Plan Comment continue skilled OT per POC as able/appropriate      Consult- Merle Herrera 1960, 60 y o  male MRN: [id number]    Unit/Bed#: -01 Encounter: [id number]    Primary Care Provider: Tripp [facility]   Date and time admitted to hospital: 10/8/2019  7:21 PM      Inpatient consult to Neurosurgery  Consult performed by: Mason Morgan PA-C  Consult ordered by: Ana Bryant DO      Consult completed on 10/9/2019 at 1400     * Right foot drop  Assessment & Plan  · Patient complaining of RLE numbness and weakness after lifting heavy object about 1 month ago and subsequently falling down the stairs over the weekend due to right leg buckling  · S/p LAMINECTOMY LUMBAR LAMINECTOMY, DISCECTOMY, FORAMINOTOMY, L3-4, RIGHT in 12/2018 for right foot drop which is persistent  · Was seen in the ED on 10/7 and discharged but presented again to expedite workup    Plan:  · MRI thoracic and lumbar spine ordered and pending to assess for acute etiology  · Medical management and pain control per primary team  · DVT prophylaxis:  SCDs and lovenox  · Mobilize as tolerated with assistance, PT/OT evaluation    Neurosurgery will follow as needed until imaging is completed  Patient is not exhibiting any significant red flag signs  At this time surgery is not warranted until imaging is back  Please call with questions or concerns  Essential hypertension  Assessment & Plan  · Medical management per primary team  · Currently on amlodipine        History of Present Illness   HPI: Merle Herrera is a 60y o  year old male with PMH including status post lumbar laminectomy, diskectomy, foraminotomy L3-4 in December 2018 for right foot drop, chronic left footdrop, hypertension, history of bladder cancer who presents with complaint of back pain with right lower extremity numbness and weakness after lifting heavy objects 1 month ago and subsequently falling down the stairs a few days ago      Patient states over a month ago he was lifting a heavy object in a few weeks later started experiencing right-sided back spasms  He developed numbness and tingling in his distal right lower extremity from the knee down  Over the weekend he fell down 10 hardwood steps without head trauma or loss of consciousness  At that time he realized that he had lost muscle tone in his right leg  He has call the Neurosurgery office several times regarding this issue and was instructed to go to the emergency room if he felt he had a change in neurological exam   He was seen in the ED on October 7th and discharged as he did not want to stay to get MRI imaging  He returned on the 9th for expedited workup  At this time he complains of right-sided back pain, continued numbness in distal right lower extremity, weakness in proximal right lower extremity  Denies saddle anesthesia, bowel or bladder issues, headache, dizziness, change in vision or hearing  Continues to have bilateral foot drop  States he wears 1 orthotic but should have to  States he is still ambulating but feels his gait is slightly off to accommodate for right lower extremity weakness  Review of Systems   Constitutional: Positive for activity change  Negative for chills and fever  HENT: Negative for hearing loss and trouble swallowing  Eyes: Negative for visual disturbance  Respiratory: Negative for chest tightness and shortness of breath  Cardiovascular: Negative for chest pain  Gastrointestinal: Negative for abdominal pain, constipation, diarrhea, nausea and vomiting  Genitourinary: Negative for difficulty urinating  Musculoskeletal: Positive for back pain  Negative for neck pain  Skin: Negative for wound  Allergic/Immunologic: Negative for environmental allergies and food allergies  Neurological: Positive for weakness and numbness  Negative for dizziness, facial asymmetry, speech difficulty and headaches  Hematological: Does not bruise/bleed easily  Psychiatric/Behavioral: Negative for confusion         Historical Information   Past Medical History:   Diagnosis Date    Dry skin dermatitis     Dyspepsia     last assessed 09/27/17    Hypertension      Past Surgical History:   Procedure Laterality Date    BACK SURGERY Left     left with foot drop after surgery    BLADDER SURGERY      POSTERIOR LAMINECTOMY THORACIC AND LUMBAR SPINE Right 12/17/2018    Procedure: LAMINECTOMY LUMBAR LAMINECTOMY, DISCECTOMY, FORAMINOTOMY, L3-4, RIGHT;  Surgeon: Fayrene Seip, MD;  Location: [facility];  Service: Neurosurgery     Social History     Substance and Sexual Activity   Alcohol Use Yes    Frequency: Monthly or less     Social History     Substance and Sexual Activity   Drug Use No     Social History     Tobacco Use   Smoking Status Never Smoker   Smokeless Tobacco Never Used     Family History   Problem Relation Age of Onset    Stomach cancer Mother     Raynaud syndrome Mother     Heart attack Father     Heart attack Brother        Meds/Allergies   all current active meds have been reviewed, current meds:   Current Facility-Administered Medications   Medication Dose Route Frequency    acetaminophen (TYLENOL) tablet 650 mg  650 mg Oral Q6H PRN    amLODIPine (NORVASC) tablet 2 5 mg  2 5 mg Oral Daily    enoxaparin (LOVENOX) subcutaneous injection 40 mg  40 mg Subcutaneous Daily    lidocaine (LIDODERM) 5 % patch 1 patch  1 patch Topical Daily    methocarbamol (ROBAXIN) tablet 750 mg  750 mg Oral Q6H [address]    tamsulosin (FLOMAX) capsule 0 4 mg  0 4 mg Oral Daily    traZODone (DESYREL) tablet 50 mg  50 mg Oral HS    valACYclovir (VALTREX) tablet 500 mg  500 mg Oral BID    and PTA meds:   Prior to Admission Medications   Prescriptions Last Dose Informant Patient Reported? Taking?    amLODIPine (NORVASC) 2 5 mg tablet 10/8/2019 at Unknown time Self No Yes   Sig: Take 1 tablet (2 5 mg total) by mouth daily   methocarbamol (ROBAXIN) 750 mg tablet   No No   Sig: Take 1 tablet (750 mg total) by mouth every 6 (six) hours as needed for muscle spasms for up to 14 days   tamsulosin (FLOMAX) 0 4 mg 10/8/2019 at Unknown time Self Yes Yes   Sig: Take 1 capsule by mouth daily   traZODone (DESYREL) 50 mg tablet Past Week at Unknown time  No Yes   Sig: Take 1 tablet (50 mg total) by mouth daily at bedtime   valACYclovir (VALTREX) 500 mg tablet Past Week at Unknown time Self Yes Yes   Sig: Take 500 mg by mouth 2 (two) times a day      Facility-Administered Medications: None     No Known Allergies    Objective   I/O       10/07 0701 - 10/08 0700 10/08 0701 - 10/09 0700 10/09 0701 - 10/10 0700    P  O    1080    Total Intake(mL/kg)   1080 (13 1)    Urine (mL/kg/hr)   600 (1)    Total Output   600    Net   +480                 Physical Exam   Constitutional: He is oriented to person, place, and time  He appears well-developed and well-nourished  He is cooperative  HENT:   Head: Normocephalic and atraumatic  Eyes: Pupils are equal, round, and reactive to light  Conjunctivae and EOM are normal    Neck: No spinous process tenderness and no muscular tenderness present  Cardiovascular: Normal rate  Pulmonary/Chest: Effort normal  No respiratory distress  Musculoskeletal:        Cervical back: He exhibits no tenderness  Thoracic back: He exhibits no tenderness  Lumbar back: He exhibits no tenderness  Neurological: He is alert and oriented to person, place, and time  He has a normal Finger-Nose-Finger Test    Reflex Scores:       Bicep reflexes are 2+ on the right side and 2+ on the left side  Brachioradialis reflexes are 2+ on the right side and 2+ on the left side  Patellar reflexes are 2+ on the right side and 2+ on the left side  Achilles reflexes are 2+ on the right side and 2+ on the left side  Skin: Skin is warm, dry and intact  Psychiatric: He has a normal mood and affect   His speech is normal and behavior is normal  Judgment and thought content normal  Cognition and memory are normal      Neurologic Exam Mental Status   Oriented to person, place, and time  Follows 1 step commands  Attention: normal  Concentration: normal    Speech: speech is normal   Level of consciousness: alert  Knowledge: good  Able to perform simple calculations  Able to name object  Able to repeat  Normal comprehension  Cranial Nerves     CN II   Right visual field deficit: none  Left visual field deficit: none     CN III, IV, VI   Pupils are equal, round, and reactive to light  Extraocular motions are normal    CN III: no CN III palsy  CN VI: no CN VI palsy  Nystagmus: none   Diplopia: none  Ophthalmoparesis: none  Upgaze: normal  Downgaze: normal  Conjugate gaze: present    CN V   Right facial sensation deficit: none  Left facial sensation deficit: none    CN VII   Right facial weakness: none  Left facial weakness: none    CN VIII   Hearing: intact    CN IX, X   CN IX normal    CN X normal      CN XI   Right trapezius strength: normal  Left trapezius strength: normal    CN XII   CN XII normal      Motor Exam   Muscle bulk: normal  Overall muscle tone: normal  Right arm pronator drift: absent  Left arm pronator drift: absent    Strength   Strength 5/5 except as noted  RLE:  3+-4-/5 HF, 0/5 DF  LLE:  0/5 DF     Sensory Exam   Light touch normal    Proprioception normal    Right leg pinprick: slight decrease to PP in calf but still endorses sharp sensation    Left leg pinprick: normal  DST intact     Gait, Coordination, and Reflexes     Coordination   Finger to nose coordination: normal    Tremor   Resting tremor: absent  Intention tremor: absent  Action tremor: absent    Reflexes   Right brachioradialis: 2+  Left brachioradialis: 2+  Right biceps: 2+  Left biceps: 2+  Right patellar: 2+  Left patellar: 2+  Right achilles: 2+  Left achilles: 2+  Right : 2+  Left : 2+  Right Zarate: absent  Left Zarate: absent  Right ankle clonus: absent  Left ankle clonus: absent      Vitals:Blood pressure 146/88, pulse 67, temperature 98 °F (36 7 °C), resp  rate 19, height 6' 3" (1 905 m), weight 82 6 kg (182 lb 1 6 oz), SpO2 98 %  ,Body mass index is 22 76 kg/m²  Lab Results:   Results from last 7 days   Lab Units 10/08/19  2159   WBC Thousand/uL 10 84*   HEMOGLOBIN g/dL 15 4   HEMATOCRIT % 46 7   PLATELETS Thousands/uL 255   NEUTROS PCT % 71   MONOS PCT % 8     Results from last 7 days   Lab Units 10/08/19  2159   POTASSIUM mmol/L 4 2   CHLORIDE mmol/L 106   CO2 mmol/L 30   BUN mg/dL 23   CREATININE mg/dL 1 05   CALCIUM mg/dL 9 0       Imaging Studies: I have personally reviewed pertinent reports  and I have personally reviewed pertinent films in PACS    EKG, Pathology, and Other Studies: I have personally reviewed pertinent reports  VTE Prophylaxis: Sequential compression device (Venodyne)  and Enoxaparin (Lovenox)    Code Status: Level 1 - Full Code  Advance Directive and Living Will:      Power of :    POLST:      Counseling / Coordination of Care  I spent 20 minutes with the patient

## 2020-09-06 DIAGNOSIS — H69.80 DYSFUNCTION OF EUSTACHIAN TUBE, UNSPECIFIED LATERALITY: ICD-10-CM

## 2020-09-06 DIAGNOSIS — J30.9 ALLERGIC RHINITIS, UNSPECIFIED SEASONALITY, UNSPECIFIED TRIGGER: ICD-10-CM

## 2020-09-07 RX ORDER — MONTELUKAST SODIUM 10 MG/1
TABLET ORAL
Qty: 90 TABLET | Refills: 0 | Status: SHIPPED | OUTPATIENT
Start: 2020-09-07 | End: 2021-04-07 | Stop reason: ALTCHOICE

## 2020-09-08 PROCEDURE — 88341 IMHCHEM/IMCYTCHM EA ADD ANTB: CPT | Performed by: PATHOLOGY

## 2020-09-08 PROCEDURE — 88342 IMHCHEM/IMCYTCHM 1ST ANTB: CPT | Performed by: PATHOLOGY

## 2020-09-08 PROCEDURE — 88305 TISSUE EXAM BY PATHOLOGIST: CPT | Performed by: PATHOLOGY

## 2020-09-08 PROCEDURE — 88312 SPECIAL STAINS GROUP 1: CPT | Performed by: PATHOLOGY

## 2020-09-09 ENCOUNTER — TELEMEDICINE (OUTPATIENT)
Dept: FAMILY MEDICINE CLINIC | Facility: CLINIC | Age: 60
End: 2020-09-09
Payer: COMMERCIAL

## 2020-09-09 DIAGNOSIS — B35.1 ONYCHOMYCOSIS: ICD-10-CM

## 2020-09-09 DIAGNOSIS — H11.32 SUBCONJUNCTIVAL HEMORRHAGE OF LEFT EYE: Primary | ICD-10-CM

## 2020-09-09 DIAGNOSIS — B35.6 TINEA CRURIS: ICD-10-CM

## 2020-09-09 PROCEDURE — 1036F TOBACCO NON-USER: CPT | Performed by: PHYSICIAN ASSISTANT

## 2020-09-09 PROCEDURE — 99214 OFFICE O/P EST MOD 30 MIN: CPT | Performed by: PHYSICIAN ASSISTANT

## 2020-09-09 RX ORDER — TERBINAFINE HYDROCHLORIDE 250 MG/1
TABLET ORAL
Qty: 28 TABLET | Refills: 0 | Status: SHIPPED | OUTPATIENT
Start: 2020-09-09 | End: 2021-01-09

## 2020-09-09 NOTE — PROGRESS NOTES
Virtual Regular Visit      Assessment/Plan:  Patient Instructions   Assessment/plan:  1  Subconjunctival hemorrhage-patient has a small area of redness that is localized in his left lower sclera  It is difficult on video exam to see if the limbus is completely clear but there does not appear to be any abnormality of the iris  Patient denies any visual changes or pain associated with it  We will continue observation over the next week  If it would worsen or cause symptoms, recommend evaluation in-person by ophthalmology  2  Onycomycosis-treatment options were discussed with patient  Will start Lamisil pulse therapy, 1 week on, 3 weeks off x4 cycles  Possible side effects of medication were discussed with patient  3  Tinea cruris-recommend continuing antifungal cream as it does seem to be improving  Follow-up if persistent  Problem List Items Addressed This Visit     None      Visit Diagnoses     Subconjunctival hemorrhage of left eye    -  Primary    Onychomycosis        Relevant Medications    terbinafine (LamISIL) 250 mg tablet    Tinea cruris        Relevant Medications    terbinafine (LamISIL) 250 mg tablet               Reason for visit is   Chief Complaint   Patient presents with    Virtual Regular Visit        Encounter provider Fernanda Alonzo PA-C    Provider located at [address] [place] [address] [phone]      Recent Visits  No visits were found meeting these conditions  Showing recent visits within past 7 days and meeting all other requirements     Today's Visits  Date Type Provider Dept   09/09/20 Telemedicine Fernanda Alonzo PA-C [facility]   Showing today's visits and meeting all other requirements     Future Appointments  No visits were found meeting these conditions  Showing future appointments within next 150 days and meeting all other requirements        The patient was identified by name and date of birth   [facility] Tano Jacobs was informed that this is a telemedicine visit and that the visit is being conducted through [employer] and patient was informed that this is not a secure, HIPAA-complaint platform  He agrees to proceed     My office door was closed  No one else was in the room  He acknowledged consent and understanding of privacy and security of the video platform  The patient has agreed to participate and understands they can discontinue the visit at any time  Patient is aware this is a billable service  Subjective  Sidney Caraballo is a 63 y o  male see HPI        HPI:  This is a 58-year-old gentleman that presents via virtual video visit using [employer] platform  He is feeling well for the most part but he does have some redness in the lower part of his eye and the sclera that he wanted to have evaluated  He states it does not bother him and he just noticed it  There is no change in vision or irritation of the eye and he has not had any purulence from the eye  He also has some history of jock itch and feels that he was getting the rash back again  He started using some tear Grace Moose is all cream at home and it does seem to be getting better  He also has thickening and yellowing of all the toenails and would like to discuss treatment options to get rid of this         Past Medical History:   Diagnosis Date    Cancer ([facility] [address] )     Cancer ([facility] [address] ) 11/04/2019    Bladder 3-4 yrs ago    Chronic pain disorder     Dry skin dermatitis     Dyspepsia     last assessed 09/27/17    Hypertension        Past Surgical History:   Procedure Laterality Date    BACK SURGERY Left     left with foot drop after surgery    BLADDER SURGERY      POSTERIOR LAMINECTOMY THORACIC AND LUMBAR SPINE Right 12/17/2018    Procedure: LAMINECTOMY LUMBAR LAMINECTOMY, DISCECTOMY, FORAMINOTOMY, L3-4, RIGHT;  Surgeon: Evelyn Hughes MD;  [facility];  Service: Neurosurgery    OH LAMNOTMY INCL W/DCMPRSN NRV ROOT 1 INTRSPC LUMBR Right 11/12/2019 Procedure: LUMBAR LAMINOTOMY/FORAMINOTOMY/FACETECTOMY/DISCECTOMY L2-3, RIGHT;  Surgeon: Flo Rodriguez MD;  Location: [facility];  Service: Neurosurgery       Current Outpatient Medications   Medication Sig Dispense Refill    amLODIPine (NORVASC) 2 5 mg tablet Take 1 tablet (2 5 mg total) by mouth daily 30 tablet 5    azelastine (ASTELIN) 0 1 % nasal spray 2 sprays into each nostril 2 (two) times a day Use in each nostril as directed 30 mL 3    methylPREDNISolone 4 MG tablet therapy pack Use as directed on package 1 each 0    montelukast (SINGULAIR) 10 mg tablet TAKE 1 TABLET DAILY FOR ALLERGY 90 tablet 0    tadalafil (CIALIS) 5 MG tablet Take 1 tablet (5 mg total) by mouth daily 30 tablet 5    tamsulosin (FLOMAX) 0 4 mg Take 1 capsule by mouth daily      terbinafine (LamISIL) 250 mg tablet Take 1 tablet daily for 1 week, then stop for 3 weeks and repeat x4 cycles total 28 tablet 0    valACYclovir (VALTREX) 500 mg tablet Take 500 mg by mouth daily   3     No current facility-administered medications for this visit  Allergies   Allergen Reactions    Viagra [Sildenafil] Headache       Review of Systems   Constitutional: Negative for chills, fatigue and fever  HENT: Negative for congestion, ear pain and sinus pressure  Eyes: Negative for visual disturbance  Respiratory: Negative for cough, chest tightness and shortness of breath  Cardiovascular: Negative for chest pain and palpitations  Gastrointestinal: Negative for diarrhea, nausea and vomiting  Endocrine: Negative for polyuria  Genitourinary: Negative for dysuria and frequency  Musculoskeletal: Negative for arthralgias and myalgias  Skin: Positive for rash  Negative for pallor  Neurological: Negative for dizziness, weakness, light-headedness, numbness and headaches  Psychiatric/Behavioral: Negative for agitation, behavioral problems and sleep disturbance  All other systems reviewed and are negative        Video Exam    There were no vitals filed for this visit  Physical Exam  Constitutional:       General: He is not in acute distress  Appearance: He is well-developed  HENT:      Head: Normocephalic and atraumatic  Eyes:      General: No scleral icterus  Right eye: No discharge  Comments: Left lower sclera of the eye with injection which seems fairly localized without purulence or discharge  It does not appear to run into the iris but it is difficult on video exam to see if the limbus is completely clear  Neck:      Trachea: No tracheal deviation  Pulmonary:      Effort: Pulmonary effort is normal  No respiratory distress  Abdominal:      General: There is no distension  Musculoskeletal: Normal range of motion  Skin:     General: Skin is warm  Findings: No erythema  Neurological:      Mental Status: He is alert and oriented to person, place, and time  I spent 15 minutes directly with the patient during this visit      [address] acknowledges that he has consented to an online visit or consultation  He understands that the online visit is based solely on information provided by him, and that, in the absence of a face-to-face physical evaluation by the physician, the diagnosis he receives is both limited and provisional in terms of accuracy and completeness  This is not intended to replace a full medical face-to-face evaluation by the physician  Ric Girard understands and accepts these terms

## 2020-09-09 NOTE — PATIENT INSTRUCTIONS
Assessment/plan:  1  Subconjunctival hemorrhage-patient has a small area of redness that is localized in his left lower sclera  It is difficult on video exam to see if the limbus is completely clear but there does not appear to be any abnormality of the iris  Patient denies any visual changes or pain associated with it  We will continue observation over the next week  If it would worsen or cause symptoms, recommend evaluation in-person by ophthalmology  2  Onycomycosis-treatment options were discussed with patient  Will start Lamisil pulse therapy, 1 week on, 3 weeks off x4 cycles  Possible side effects of medication were discussed with patient  3  Tinea cruris-recommend continuing antifungal cream as it does seem to be improving  Follow-up if persistent

## 2020-09-18 ENCOUNTER — LAB REQUISITION (OUTPATIENT)
Dept: LAB | Facility: HOSPITAL | Age: 60
End: 2020-09-18
Payer: COMMERCIAL

## 2020-09-18 DIAGNOSIS — K20.90 ESOPHAGITIS, UNSPECIFIED: ICD-10-CM

## 2020-09-18 DIAGNOSIS — K25.9 GASTRIC ULCER, UNSPECIFIED AS ACUTE OR CHRONIC, WITHOUT HEMORRHAGE OR PERFORATION: ICD-10-CM

## 2020-12-03 ENCOUNTER — VBI (OUTPATIENT)
Dept: ADMINISTRATIVE | Facility: OTHER | Age: 60
End: 2020-12-03

## 2021-03-10 DIAGNOSIS — Z23 ENCOUNTER FOR IMMUNIZATION: ICD-10-CM

## 2021-03-26 ENCOUNTER — IMMUNIZATIONS (OUTPATIENT)
Dept: FAMILY MEDICINE CLINIC | Facility: HOSPITAL | Age: 61
End: 2021-03-26

## 2021-03-26 DIAGNOSIS — Z23 ENCOUNTER FOR IMMUNIZATION: Primary | ICD-10-CM

## 2021-03-26 PROCEDURE — 91301 SARS-COV-2 / COVID-19 MRNA VACCINE (MODERNA) 100 MCG: CPT

## 2021-03-26 PROCEDURE — 0011A SARS-COV-2 / COVID-19 MRNA VACCINE (MODERNA) 100 MCG: CPT

## 2021-03-30 DIAGNOSIS — I10 ESSENTIAL HYPERTENSION: ICD-10-CM

## 2021-03-30 RX ORDER — AMLODIPINE BESYLATE 2.5 MG/1
2.5 TABLET ORAL DAILY
Qty: 30 TABLET | Refills: 5 | OUTPATIENT
Start: 2021-03-30

## 2021-03-30 NOTE — TELEPHONE ENCOUNTER
PATIENT CALLED IN WOULD LIKE REFILL ON AMLODIPIN; PLEASE SEND SCRIPT TO [facility]          IF YOU HAVE ANY QUESTIONS/CONCERNS; PLEASE CALL PATIENT [phone]

## 2021-04-01 DIAGNOSIS — I10 ESSENTIAL HYPERTENSION: ICD-10-CM

## 2021-04-01 RX ORDER — AMLODIPINE BESYLATE 2.5 MG/1
2.5 TABLET ORAL DAILY
Qty: 90 TABLET | Refills: 2 | Status: CANCELLED | OUTPATIENT
Start: 2021-04-01

## 2021-04-02 RX ORDER — AMLODIPINE BESYLATE 2.5 MG/1
2.5 TABLET ORAL DAILY
Qty: 14 TABLET | Refills: 0 | Status: SHIPPED | OUTPATIENT
Start: 2021-04-02 | End: 2021-04-07 | Stop reason: SDUPTHER

## 2021-04-07 ENCOUNTER — OFFICE VISIT (OUTPATIENT)
Dept: FAMILY MEDICINE CLINIC | Facility: CLINIC | Age: 61
End: 2021-04-07
Payer: COMMERCIAL

## 2021-04-07 VITALS
HEIGHT: 75 IN | HEART RATE: 72 BPM | DIASTOLIC BLOOD PRESSURE: 80 MMHG | SYSTOLIC BLOOD PRESSURE: 124 MMHG | BODY MASS INDEX: 22.95 KG/M2 | WEIGHT: 184.6 LBS

## 2021-04-07 DIAGNOSIS — E78.2 MIXED HYPERLIPIDEMIA: ICD-10-CM

## 2021-04-07 DIAGNOSIS — M54.16 LUMBAR RADICULOPATHY: ICD-10-CM

## 2021-04-07 DIAGNOSIS — M21.372 LEFT FOOT DROP: ICD-10-CM

## 2021-04-07 DIAGNOSIS — I10 ESSENTIAL HYPERTENSION: ICD-10-CM

## 2021-04-07 DIAGNOSIS — B00.1 COLD SORE: Primary | ICD-10-CM

## 2021-04-07 DIAGNOSIS — M21.371 RIGHT FOOT DROP: ICD-10-CM

## 2021-04-07 DIAGNOSIS — C67.9 MALIGNANT NEOPLASM OF URINARY BLADDER, UNSPECIFIED SITE (HCC): ICD-10-CM

## 2021-04-07 PROCEDURE — 3725F SCREEN DEPRESSION PERFORMED: CPT | Performed by: NURSE PRACTITIONER

## 2021-04-07 PROCEDURE — 99214 OFFICE O/P EST MOD 30 MIN: CPT | Performed by: NURSE PRACTITIONER

## 2021-04-07 PROCEDURE — 3008F BODY MASS INDEX DOCD: CPT | Performed by: NURSE PRACTITIONER

## 2021-04-07 PROCEDURE — 1036F TOBACCO NON-USER: CPT | Performed by: NURSE PRACTITIONER

## 2021-04-07 RX ORDER — VALACYCLOVIR HYDROCHLORIDE 500 MG/1
500 TABLET, FILM COATED ORAL DAILY
Qty: 30 TABLET | Refills: 2 | Status: SHIPPED | OUTPATIENT
Start: 2021-04-07 | End: 2022-06-09 | Stop reason: ALTCHOICE

## 2021-04-07 RX ORDER — CIPROFLOXACIN 500 MG/1
500 TABLET, FILM COATED ORAL 2 TIMES DAILY
COMMUNITY
Start: 2021-04-05 | End: 2021-12-02 | Stop reason: SDUPTHER

## 2021-04-07 RX ORDER — AMLODIPINE BESYLATE 2.5 MG/1
2.5 TABLET ORAL DAILY
Qty: 90 TABLET | Refills: 1 | Status: SHIPPED | OUTPATIENT
Start: 2021-04-07 | End: 2022-02-28 | Stop reason: SDUPTHER

## 2021-04-07 NOTE — PROGRESS NOTES
Assessment and Plan:    Problem List Items Addressed This Visit        Cardiovascular and Mediastinum    Essential hypertension     Patient blood pressure presently stable currently on amlodipine 2 5mg  no changes recommended  Relevant Medications    amLODIPine (NORVASC) 2 5 mg tablet    Other Relevant Orders    Lipid panel    Comprehensive metabolic panel    TSH, 3rd generation with Free T4 reflex       Nervous and Auditory    Lumbar radiculopathy     Patient is seeing chiropractor for ongoing back and joint issues  Relevant Medications    magnesium oxide (MAG-OX) 400 mg       Genitourinary    History of bladder cancer     Patient follows with urology annually  Other    Right foot drop     Patient remains in brace for foot drop  Left foot drop     Continues with brace  Needs it to ambulate  Hyperlipidemia     Patient needs updated blood work  No currently on medical treatment  Relevant Orders    Lipid panel    Comprehensive metabolic panel    TSH, 3rd generation with Free T4 reflex      Other Visit Diagnoses     Cold sore    -  Primary    Relevant Medications    valACYclovir (VALTREX) 500 mg tablet                 Diagnoses and all orders for this visit:    Cold sore  -     valACYclovir (VALTREX) 500 mg tablet; Take 1 tablet (500 mg total) by mouth daily for 365 doses    Essential hypertension  -     amLODIPine (NORVASC) 2 5 mg tablet; Take 1 tablet (2 5 mg total) by mouth daily  -     Lipid panel  -     Comprehensive metabolic panel; Future  -     TSH, 3rd generation with Free T4 reflex; Future    Malignant neoplasm of urinary bladder, unspecified site [facility])    Mixed hyperlipidemia  -     Lipid panel  -     Comprehensive metabolic panel; Future  -     TSH, 3rd generation with Free T4 reflex; Future    Right foot drop    Left foot drop    Lumbar radiculopathy  -     magnesium oxide (MAG-OX) 400 mg;  Take 1 tablet (400 mg total) by mouth 2 (two) times a day    Other orders  -     ciprofloxacin (CIPRO) 500 mg tablet; Take 500 mg by mouth 2 (two) times a day              Subjective:      Patient ID: Malcom Krabbe is a 57 y o  male  CC:    Chief Complaint   Patient presents with    Follow-up     Follow up and refill medications    Hypertension    Medication Refill       HPI:    Patient presents today for follow up  He has not had blood work completed  He does follow up with his urology annually  Patient reports he is having ongoing back pain, foot drop  Pt does see chiropractor regularly  He is looking for massage therapy  He is wearing brace to both lower legs for ambulation support  The following portions of the patient's history were reviewed and updated as appropriate: allergies, current medications, past family history, past medical history, past social history, past surgical history and problem list       Review of Systems   Constitutional: Negative for diaphoresis, fatigue and unexpected weight change  HENT: Negative for trouble swallowing  Eyes: Negative for visual disturbance  Respiratory: Negative for cough, chest tightness and shortness of breath  Cardiovascular: Negative for chest pain, palpitations and leg swelling  Gastrointestinal: Negative for constipation  Endocrine: Negative for polydipsia, polyphagia and polyuria  Genitourinary: Negative for difficulty urinating  Musculoskeletal: Positive for back pain (with joint stiffness )  Negative for arthralgias and myalgias  Neurological: Positive for weakness  Negative for dizziness, light-headedness and headaches  Psychiatric/Behavioral: Negative for sleep disturbance  Data to review:       Objective:    Vitals:    04/07/21 1047 04/07/21 1118   BP: 140/82 124/80   BP Location: Left arm    Patient Position: Sitting    Pulse: 72    Weight: 83 7 kg (184 lb 9 6 oz)    Height: 6' 3" (1 905 m)         Physical Exam  Vitals signs and nursing note reviewed  Constitutional:       Appearance: He is not ill-appearing  HENT:      Head: Normocephalic and atraumatic  Right Ear: Tympanic membrane normal       Left Ear: Tympanic membrane normal    Eyes:      Extraocular Movements: Extraocular movements intact  Conjunctiva/sclera: Conjunctivae normal    Neck:      Thyroid: No thyromegaly  Vascular: No carotid bruit or JVD  Cardiovascular:      Rate and Rhythm: Normal rate and regular rhythm  Pulses:           Carotid pulses are 2+ on the right side and 2+ on the left side  Heart sounds: Normal heart sounds, S1 normal and S2 normal  No murmur  Pulmonary:      Effort: Pulmonary effort is normal  No respiratory distress  Breath sounds: Normal breath sounds and air entry  No wheezing  Abdominal:      General: Bowel sounds are normal  There is no distension  Palpations: Abdomen is soft  Musculoskeletal:      Right lower leg: No edema  Left lower leg: No edema  Skin:     Coloration: Skin is not pale  Findings: No erythema  Neurological:      Mental Status: He is alert and oriented to person, place, and time  Motor: Atrophy (bilateral lower weakness ) present  Gait: Gait abnormal    Psychiatric:         Mood and Affect: Mood normal          Behavior: Behavior normal          Thought Content:  Thought content normal          Judgment: Judgment normal

## 2021-04-28 ENCOUNTER — IMMUNIZATIONS (OUTPATIENT)
Dept: FAMILY MEDICINE CLINIC | Facility: HOSPITAL | Age: 61
End: 2021-04-28

## 2021-04-28 DIAGNOSIS — Z23 ENCOUNTER FOR IMMUNIZATION: Primary | ICD-10-CM

## 2021-04-28 PROCEDURE — 91301 SARS-COV-2 / COVID-19 MRNA VACCINE (MODERNA) 100 MCG: CPT

## 2021-04-28 PROCEDURE — 0012A SARS-COV-2 / COVID-19 MRNA VACCINE (MODERNA) 100 MCG: CPT

## 2021-09-01 ENCOUNTER — RA CDI HCC (OUTPATIENT)
Dept: OTHER | Facility: HOSPITAL | Age: 61
End: 2021-09-01

## 2021-09-01 NOTE — PROGRESS NOTES
[facility]  coding opportunities       Chart reviewed, no opportunity found: CHART REVIEWED, NO OPPORTUNITY FOUND     Noted bpa data--mri from 2019-after surgery doesn't support asking for cord compression currently and data was pulled from 2018-is s/p surgery-don't have enough clinical indication, documentation or rx currently to ask LM                     Patients insurance company: [employer]

## 2021-09-02 ENCOUNTER — APPOINTMENT (OUTPATIENT)
Dept: LAB | Facility: HOSPITAL | Age: 61
End: 2021-09-02
Payer: COMMERCIAL

## 2021-09-02 DIAGNOSIS — N42.9 DISEASE OF PROSTATE: ICD-10-CM

## 2021-09-02 LAB — PSA SERPL-MCNC: 1.2 NG/ML (ref 0–4)

## 2021-09-02 PROCEDURE — 84153 ASSAY OF PSA TOTAL: CPT

## 2021-11-08 ENCOUNTER — VBI (OUTPATIENT)
Dept: ADMINISTRATIVE | Facility: OTHER | Age: 61
End: 2021-11-08

## 2021-12-02 ENCOUNTER — TELEMEDICINE (OUTPATIENT)
Dept: FAMILY MEDICINE CLINIC | Facility: CLINIC | Age: 61
End: 2021-12-02
Payer: COMMERCIAL

## 2021-12-02 DIAGNOSIS — R50.9 FEVER, UNSPECIFIED FEVER CAUSE: Primary | ICD-10-CM

## 2021-12-02 DIAGNOSIS — N41.1 CHRONIC PROSTATITIS: ICD-10-CM

## 2021-12-02 DIAGNOSIS — M21.371 RIGHT FOOT DROP: ICD-10-CM

## 2021-12-02 PROCEDURE — U0005 INFEC AGEN DETEC AMPLI PROBE: HCPCS | Performed by: PHYSICIAN ASSISTANT

## 2021-12-02 PROCEDURE — U0003 INFECTIOUS AGENT DETECTION BY NUCLEIC ACID (DNA OR RNA); SEVERE ACUTE RESPIRATORY SYNDROME CORONAVIRUS 2 (SARS-COV-2) (CORONAVIRUS DISEASE [COVID-19]), AMPLIFIED PROBE TECHNIQUE, MAKING USE OF HIGH THROUGHPUT TECHNOLOGIES AS DESCRIBED BY CMS-2020-01-R: HCPCS | Performed by: PHYSICIAN ASSISTANT

## 2021-12-02 PROCEDURE — 99214 OFFICE O/P EST MOD 30 MIN: CPT | Performed by: PHYSICIAN ASSISTANT

## 2021-12-02 RX ORDER — CIPROFLOXACIN 500 MG/1
500 TABLET, FILM COATED ORAL 2 TIMES DAILY
Qty: 28 TABLET | Refills: 0 | Status: SHIPPED | OUTPATIENT
Start: 2021-12-02 | End: 2021-12-16

## 2021-12-06 ENCOUNTER — TELEMEDICINE (OUTPATIENT)
Dept: FAMILY MEDICINE CLINIC | Facility: CLINIC | Age: 61
End: 2021-12-06
Payer: COMMERCIAL

## 2021-12-06 DIAGNOSIS — U07.1 COVID-19: Primary | ICD-10-CM

## 2021-12-06 PROCEDURE — 99213 OFFICE O/P EST LOW 20 MIN: CPT | Performed by: PHYSICIAN ASSISTANT

## 2021-12-07 ENCOUNTER — TELEMEDICINE (OUTPATIENT)
Dept: FAMILY MEDICINE CLINIC | Facility: CLINIC | Age: 61
End: 2021-12-07
Payer: COMMERCIAL

## 2021-12-07 DIAGNOSIS — U07.1 COVID-19: Primary | ICD-10-CM

## 2021-12-07 PROCEDURE — 99213 OFFICE O/P EST LOW 20 MIN: CPT | Performed by: PHYSICIAN ASSISTANT

## 2021-12-08 ENCOUNTER — TELEMEDICINE (OUTPATIENT)
Dept: FAMILY MEDICINE CLINIC | Facility: CLINIC | Age: 61
End: 2021-12-08
Payer: COMMERCIAL

## 2021-12-08 DIAGNOSIS — U07.1 COVID-19: Primary | ICD-10-CM

## 2021-12-08 PROCEDURE — 99213 OFFICE O/P EST LOW 20 MIN: CPT | Performed by: PHYSICIAN ASSISTANT

## 2021-12-09 ENCOUNTER — TELEMEDICINE (OUTPATIENT)
Dept: FAMILY MEDICINE CLINIC | Facility: CLINIC | Age: 61
End: 2021-12-09
Payer: COMMERCIAL

## 2021-12-09 DIAGNOSIS — U07.1 COVID-19: Primary | ICD-10-CM

## 2021-12-09 PROCEDURE — 99212 OFFICE O/P EST SF 10 MIN: CPT | Performed by: PHYSICIAN ASSISTANT

## 2021-12-10 ENCOUNTER — TELEMEDICINE (OUTPATIENT)
Dept: FAMILY MEDICINE CLINIC | Facility: CLINIC | Age: 61
End: 2021-12-10
Payer: COMMERCIAL

## 2021-12-10 DIAGNOSIS — U07.1 COVID-19: Primary | ICD-10-CM

## 2021-12-10 PROCEDURE — 99212 OFFICE O/P EST SF 10 MIN: CPT | Performed by: PHYSICIAN ASSISTANT

## 2021-12-21 ENCOUNTER — TELEPHONE (OUTPATIENT)
Dept: FAMILY MEDICINE CLINIC | Facility: CLINIC | Age: 61
End: 2021-12-21

## 2022-01-12 ENCOUNTER — IMMUNIZATIONS (OUTPATIENT)
Dept: FAMILY MEDICINE CLINIC | Facility: HOSPITAL | Age: 62
End: 2022-01-12

## 2022-01-12 DIAGNOSIS — Z23 ENCOUNTER FOR IMMUNIZATION: Primary | ICD-10-CM

## 2022-01-12 PROCEDURE — 0064A COVID-19 MODERNA VACC 0.25 ML BOOSTER: CPT

## 2022-01-12 PROCEDURE — 91306 COVID-19 MODERNA VACC 0.25 ML BOOSTER: CPT

## 2022-02-28 DIAGNOSIS — I10 ESSENTIAL HYPERTENSION: ICD-10-CM

## 2022-02-28 RX ORDER — AMLODIPINE BESYLATE 2.5 MG/1
2.5 TABLET ORAL DAILY
Qty: 30 TABLET | Refills: 1 | Status: SHIPPED | OUTPATIENT
Start: 2022-02-28 | End: 2022-05-06

## 2022-06-06 DIAGNOSIS — I10 ESSENTIAL HYPERTENSION: ICD-10-CM

## 2022-06-06 RX ORDER — AMLODIPINE BESYLATE 2.5 MG/1
2.5 TABLET ORAL DAILY
Qty: 30 TABLET | Refills: 0 | Status: SHIPPED | OUTPATIENT
Start: 2022-06-06 | End: 2022-06-09 | Stop reason: SDUPTHER

## 2022-06-09 ENCOUNTER — OFFICE VISIT (OUTPATIENT)
Dept: FAMILY MEDICINE CLINIC | Facility: CLINIC | Age: 62
End: 2022-06-09
Payer: COMMERCIAL

## 2022-06-09 VITALS
HEIGHT: 75 IN | HEART RATE: 68 BPM | DIASTOLIC BLOOD PRESSURE: 80 MMHG | BODY MASS INDEX: 21.93 KG/M2 | WEIGHT: 176.4 LBS | SYSTOLIC BLOOD PRESSURE: 140 MMHG

## 2022-06-09 DIAGNOSIS — E78.2 MIXED HYPERLIPIDEMIA: ICD-10-CM

## 2022-06-09 DIAGNOSIS — M18.0 ARTHRITIS OF CARPOMETACARPAL (CMC) JOINT OF BOTH THUMBS: ICD-10-CM

## 2022-06-09 DIAGNOSIS — Z11.4 SCREENING FOR HIV (HUMAN IMMUNODEFICIENCY VIRUS): ICD-10-CM

## 2022-06-09 DIAGNOSIS — M21.372 LEFT FOOT DROP: ICD-10-CM

## 2022-06-09 DIAGNOSIS — M54.16 LUMBAR RADICULOPATHY: ICD-10-CM

## 2022-06-09 DIAGNOSIS — I10 ESSENTIAL HYPERTENSION: ICD-10-CM

## 2022-06-09 DIAGNOSIS — C67.9 MALIGNANT NEOPLASM OF URINARY BLADDER, UNSPECIFIED SITE (HCC): ICD-10-CM

## 2022-06-09 DIAGNOSIS — N40.1 BENIGN NODULAR PROSTATIC HYPERPLASIA WITH LOWER URINARY TRACT SYMPTOMS: Primary | ICD-10-CM

## 2022-06-09 DIAGNOSIS — M21.371 RIGHT FOOT DROP: ICD-10-CM

## 2022-06-09 DIAGNOSIS — Z11.3 ROUTINE SCREENING FOR STI (SEXUALLY TRANSMITTED INFECTION): ICD-10-CM

## 2022-06-09 PROBLEM — S23.3XXA SPRAIN OF UPPER BACK: Status: RESOLVED | Noted: 2019-09-16 | Resolved: 2022-06-09

## 2022-06-09 PROCEDURE — 1036F TOBACCO NON-USER: CPT | Performed by: NURSE PRACTITIONER

## 2022-06-09 PROCEDURE — 3725F SCREEN DEPRESSION PERFORMED: CPT | Performed by: NURSE PRACTITIONER

## 2022-06-09 PROCEDURE — 99214 OFFICE O/P EST MOD 30 MIN: CPT | Performed by: NURSE PRACTITIONER

## 2022-06-09 PROCEDURE — 3008F BODY MASS INDEX DOCD: CPT | Performed by: NURSE PRACTITIONER

## 2022-06-09 RX ORDER — TAMSULOSIN HYDROCHLORIDE 0.4 MG/1
0.4 CAPSULE ORAL DAILY
Qty: 90 CAPSULE | Refills: 1 | Status: SHIPPED | OUTPATIENT
Start: 2022-06-09

## 2022-06-09 RX ORDER — AMLODIPINE BESYLATE 2.5 MG/1
2.5 TABLET ORAL DAILY
Qty: 90 TABLET | Refills: 0 | Status: SHIPPED | OUTPATIENT
Start: 2022-06-09 | End: 2022-07-21

## 2022-06-09 RX ORDER — CELECOXIB 100 MG/1
100 CAPSULE ORAL 2 TIMES DAILY
Qty: 60 CAPSULE | Refills: 3 | Status: SHIPPED | OUTPATIENT
Start: 2022-06-09

## 2022-06-09 NOTE — PROGRESS NOTES
Assessment and Plan:    Problem List Items Addressed This Visit        Cardiovascular and Mediastinum    Essential hypertension     Patient blood pressure is elevated but he has been without his medications  Will reorder his amlodipine 2 5mg  Recheck BP in 4 weeks  Relevant Medications    amLODIPine (NORVASC) 2 5 mg tablet    Other Relevant Orders    Lipid panel    Comprehensive metabolic panel    CBC       Nervous and Auditory    Lumbar radiculopathy     Patient has some complaints of lower leg cramps  Recommend potassium supplement over the counter or eating banana before bed  I did also recommend trying the magnesium and vitamin b12  Relevant Medications    Potassium 75 MG TABS       Musculoskeletal and Integument    Arthritis of carpometacarpal (CMC) joint of both thumbs     [facility] snot want do complete xrays  Will trial celebrex  Relevant Medications    celecoxib (CeleBREX) 100 mg capsule       Genitourinary    BPH - Primary     Patient sees urology  Is on flomax  Due for PSA in September 2022           Relevant Medications    tamsulosin (FLOMAX) 0 4 mg    History of bladder cancer     Follows with urology            Relevant Medications    tamsulosin (FLOMAX) 0 4 mg       Other    Right foot drop     Brace in place           Left foot drop     Brace in place           Hyperlipidemia     No current meds  Over due for lipid panel  Relevant Orders    CBC    TSH, 3rd generation with Free T4 reflex      Other Visit Diagnoses     Screening for HIV (human immunodeficiency virus)        Relevant Orders    HIV 1/2 Antigen/Antibody (4th Generation) w Reflex [facility]    Routine screening for STI (sexually transmitted infection)        Relevant Orders    RPR    Chlamydia/GC amplified DNA by PCR                 Diagnoses and all orders for this visit:    BPH  -     tamsulosin (FLOMAX) 0 4 mg;  Take 1 capsule (0 4 mg total) by mouth daily    Essential hypertension  -     amLODIPine (NORVASC) 2 5 mg tablet; Take 1 tablet (2 5 mg total) by mouth daily  -     Lipid panel  -     Comprehensive metabolic panel; Future  -     CBC    Left foot drop    Right foot drop    Mixed hyperlipidemia  -     CBC  -     TSH, 3rd generation with Free T4 reflex; Future    Lumbar radiculopathy  -     Potassium 75 MG TABS; 75-99mg daily at bedtime    Malignant neoplasm of urinary bladder, unspecified site (HCC)    Screening for HIV (human immunodeficiency virus)  -     HIV 1/2 Antigen/Antibody (4th Generation) w Reflex [facility]; Future    Routine screening for STI (sexually transmitted infection)  -     RPR; Future  -     Chlamydia/GC amplified DNA by PCR; Future    Arthritis of carpometacarpal (CMC) joint of both thumbs  -     celecoxib (CeleBREX) 100 mg capsule; Take 1 capsule (100 mg total) by mouth 2 (two) times a day              Subjective:      Patient ID: Kimberly Angel is a 60 y o  male  CC:    Chief Complaint   Patient presents with    Medication Refill     Pt is present today for medication refill and other issues he would like to discuss with provider  HPI:    HPI     Patient reports she has been out his amlodipine for the last 4 days  He states he has been having bilateral thumb pain from arthritis  He 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  Has had injections in the past     He states his GF was diagnosed with PID and wants him to be tested for STI     Patient does also complain of muscle cramping especially in the morning  The following portions of the patient's history were reviewed and updated as appropriate: allergies, current medications, past family history, past medical history, past social history, past surgical history and problem list       Review of Systems   Constitutional: Negative for diaphoresis, fatigue and unexpected weight change  HENT: Negative for trouble swallowing  Eyes: Negative for visual disturbance     Respiratory: Negative for cough, chest tightness and shortness of breath  Cardiovascular: Negative for chest pain, palpitations and leg swelling  Gastrointestinal: Negative for constipation, diarrhea, nausea and vomiting  Endocrine: Negative for polydipsia, polyphagia and polyuria  Genitourinary: Positive for difficulty urinating  Musculoskeletal: Negative for arthralgias and myalgias  Neurological: Negative for dizziness, light-headedness and headaches  Psychiatric/Behavioral: Negative for dysphoric mood and sleep disturbance  The patient is not nervous/anxious  Data to review:       Objective:    Vitals:    06/09/22 1331   BP: 140/80   BP Location: Right arm   Patient Position: Sitting   Cuff Size: Standard   Pulse: 68   Weight: 80 kg (176 lb 6 4 oz)   Height: 6' 2 5" (1 892 m)        Physical Exam  Vitals and nursing note reviewed  Constitutional:       General: He is not in acute distress  Appearance: Normal appearance  He is normal weight  He is not ill-appearing, toxic-appearing or diaphoretic  HENT:      Head: Normocephalic and atraumatic  Nose: Nose normal    Eyes:      Extraocular Movements: Extraocular movements intact  Conjunctiva/sclera: Conjunctivae normal       Pupils: Pupils are equal, round, and reactive to light  Neck:      Thyroid: No thyromegaly  Vascular: No carotid bruit or JVD  Cardiovascular:      Rate and Rhythm: Normal rate and regular rhythm  Pulses:           Carotid pulses are 2+ on the right side and 2+ on the left side  Heart sounds: Normal heart sounds, S1 normal and S2 normal  No murmur heard  Pulmonary:      Effort: Pulmonary effort is normal  No respiratory distress  Breath sounds: Normal breath sounds and air entry  No wheezing  Musculoskeletal:      Right hand: Bony tenderness (thumb) present  Normal strength  Left hand: Bony tenderness (thumb ) present  Normal strength  Right lower leg: No edema  Left lower leg: No edema  Skin:     Coloration: Skin is not pale  Findings: No erythema  Neurological:      General: No focal deficit present  Mental Status: He is alert and oriented to person, place, and time  Psychiatric:         Mood and Affect: Mood normal          Behavior: Behavior normal          Thought Content:  Thought content normal          Judgment: Judgment normal

## 2022-06-09 NOTE — ASSESSMENT & PLAN NOTE
Patient has some complaints of lower leg cramps  Recommend potassium supplement over the counter or eating banana before bed  I did also recommend trying the magnesium and vitamin b12

## 2022-06-09 NOTE — ASSESSMENT & PLAN NOTE
Patient blood pressure is elevated but he has been without his medications  Will reorder his amlodipine 2 5mg  Recheck BP in 4 weeks

## 2022-06-10 PROCEDURE — 87591 N.GONORRHOEAE DNA AMP PROB: CPT | Performed by: NURSE PRACTITIONER

## 2022-06-10 PROCEDURE — 87491 CHLMYD TRACH DNA AMP PROBE: CPT | Performed by: NURSE PRACTITIONER

## 2022-06-11 LAB
C TRACH DNA SPEC QL NAA+PROBE: NEGATIVE
N GONORRHOEA DNA SPEC QL NAA+PROBE: NEGATIVE

## 2022-06-13 ENCOUNTER — APPOINTMENT (OUTPATIENT)
Dept: LAB | Age: 62
End: 2022-06-13
Payer: COMMERCIAL

## 2022-06-13 DIAGNOSIS — E78.2 MIXED HYPERLIPIDEMIA: ICD-10-CM

## 2022-06-13 DIAGNOSIS — Z11.4 SCREENING FOR HIV (HUMAN IMMUNODEFICIENCY VIRUS): ICD-10-CM

## 2022-06-13 DIAGNOSIS — Z11.3 ROUTINE SCREENING FOR STI (SEXUALLY TRANSMITTED INFECTION): ICD-10-CM

## 2022-06-13 DIAGNOSIS — I10 ESSENTIAL HYPERTENSION: ICD-10-CM

## 2022-06-13 LAB
ALBUMIN SERPL BCP-MCNC: 3.9 G/DL (ref 3.5–5)
ALP SERPL-CCNC: 52 U/L (ref 46–116)
ALT SERPL W P-5'-P-CCNC: 34 U/L (ref 12–78)
ANION GAP SERPL CALCULATED.3IONS-SCNC: 1 MMOL/L (ref 4–13)
AST SERPL W P-5'-P-CCNC: 26 U/L (ref 5–45)
BILIRUB SERPL-MCNC: 1.4 MG/DL (ref 0.2–1)
BUN SERPL-MCNC: 29 MG/DL (ref 5–25)
CALCIUM SERPL-MCNC: 8.8 MG/DL (ref 8.3–10.1)
CHLORIDE SERPL-SCNC: 108 MMOL/L (ref 100–108)
CHOLEST SERPL-MCNC: 180 MG/DL
CO2 SERPL-SCNC: 31 MMOL/L (ref 21–32)
CREAT SERPL-MCNC: 1.05 MG/DL (ref 0.6–1.3)
ERYTHROCYTE [DISTWIDTH] IN BLOOD BY AUTOMATED COUNT: 13.1 % (ref 11.6–15.1)
GFR SERPL CREATININE-BSD FRML MDRD: 75 ML/MIN/1.73SQ M
GLUCOSE P FAST SERPL-MCNC: 83 MG/DL (ref 65–99)
HCT VFR BLD AUTO: 46.1 % (ref 36.5–49.3)
HDLC SERPL-MCNC: 58 MG/DL
HGB BLD-MCNC: 15 G/DL (ref 12–17)
LDLC SERPL CALC-MCNC: 113 MG/DL (ref 0–100)
MCH RBC QN AUTO: 29.7 PG (ref 26.8–34.3)
MCHC RBC AUTO-ENTMCNC: 32.5 G/DL (ref 31.4–37.4)
MCV RBC AUTO: 91 FL (ref 82–98)
NONHDLC SERPL-MCNC: 122 MG/DL
PLATELET # BLD AUTO: 222 THOUSANDS/UL (ref 149–390)
PMV BLD AUTO: 9.8 FL (ref 8.9–12.7)
POTASSIUM SERPL-SCNC: 4.3 MMOL/L (ref 3.5–5.3)
PROT SERPL-MCNC: 7.4 G/DL (ref 6.4–8.2)
RBC # BLD AUTO: 5.05 MILLION/UL (ref 3.88–5.62)
RPR SER QL: NORMAL
SODIUM SERPL-SCNC: 140 MMOL/L (ref 136–145)
TRIGL SERPL-MCNC: 47 MG/DL
TSH SERPL DL<=0.05 MIU/L-ACNC: 1.53 UIU/ML (ref 0.45–4.5)
WBC # BLD AUTO: 6.21 THOUSAND/UL (ref 4.31–10.16)

## 2022-06-13 PROCEDURE — 87389 HIV-1 AG W/HIV-1&-2 AB AG IA: CPT

## 2022-06-13 PROCEDURE — 84443 ASSAY THYROID STIM HORMONE: CPT

## 2022-06-13 PROCEDURE — 80053 COMPREHEN METABOLIC PANEL: CPT

## 2022-06-13 PROCEDURE — 80061 LIPID PANEL: CPT | Performed by: NURSE PRACTITIONER

## 2022-06-13 PROCEDURE — 36415 COLL VENOUS BLD VENIPUNCTURE: CPT

## 2022-06-13 PROCEDURE — 85027 COMPLETE CBC AUTOMATED: CPT | Performed by: NURSE PRACTITIONER

## 2022-06-13 PROCEDURE — 86592 SYPHILIS TEST NON-TREP QUAL: CPT

## 2022-06-14 LAB — HIV 1+2 AB+HIV1 P24 AG SERPL QL IA: NORMAL

## 2022-07-21 DIAGNOSIS — I10 ESSENTIAL HYPERTENSION: ICD-10-CM

## 2022-07-21 RX ORDER — AMLODIPINE BESYLATE 2.5 MG/1
TABLET ORAL
Qty: 90 TABLET | Refills: 1 | Status: SHIPPED | OUTPATIENT
Start: 2022-07-21 | End: 2022-09-20

## 2022-09-09 ENCOUNTER — APPOINTMENT (OUTPATIENT)
Dept: LAB | Age: 62
End: 2022-09-09
Payer: COMMERCIAL

## 2022-09-09 DIAGNOSIS — N42.9 DISEASE OF PROSTATE: ICD-10-CM

## 2022-09-09 LAB — PSA SERPL-MCNC: 1.1 NG/ML (ref 0–4)

## 2022-09-09 PROCEDURE — 36415 COLL VENOUS BLD VENIPUNCTURE: CPT

## 2022-09-09 PROCEDURE — G0103 PSA SCREENING: HCPCS

## 2022-09-12 ENCOUNTER — OFFICE VISIT (OUTPATIENT)
Dept: FAMILY MEDICINE CLINIC | Facility: CLINIC | Age: 62
End: 2022-09-12
Payer: COMMERCIAL

## 2022-09-12 VITALS
HEART RATE: 76 BPM | WEIGHT: 176 LBS | DIASTOLIC BLOOD PRESSURE: 80 MMHG | TEMPERATURE: 97.6 F | BODY MASS INDEX: 21.88 KG/M2 | SYSTOLIC BLOOD PRESSURE: 162 MMHG | HEIGHT: 75 IN

## 2022-09-12 DIAGNOSIS — L98.9 SKIN LESION: ICD-10-CM

## 2022-09-12 DIAGNOSIS — I10 ESSENTIAL HYPERTENSION: Primary | ICD-10-CM

## 2022-09-12 DIAGNOSIS — M54.50 ACUTE RIGHT-SIDED LOW BACK PAIN WITHOUT SCIATICA: ICD-10-CM

## 2022-09-12 LAB
SL AMB  POCT GLUCOSE, UA: NORMAL
SL AMB LEUKOCYTE ESTERASE,UA: NORMAL
SL AMB POCT BILIRUBIN,UA: NORMAL
SL AMB POCT BLOOD,UA: NORMAL
SL AMB POCT CLARITY,UA: CLEAR
SL AMB POCT COLOR,UA: YELLOW
SL AMB POCT KETONES,UA: NORMAL
SL AMB POCT NITRITE,UA: NORMAL
SL AMB POCT PH,UA: 5.5
SL AMB POCT SPECIFIC GRAVITY,UA: 1.01
SL AMB POCT URINE PROTEIN: NORMAL
SL AMB POCT UROBILINOGEN: 0.2

## 2022-09-12 PROCEDURE — 99214 OFFICE O/P EST MOD 30 MIN: CPT | Performed by: NURSE PRACTITIONER

## 2022-09-12 PROCEDURE — 81002 URINALYSIS NONAUTO W/O SCOPE: CPT | Performed by: NURSE PRACTITIONER

## 2022-09-12 PROCEDURE — 87086 URINE CULTURE/COLONY COUNT: CPT | Performed by: NURSE PRACTITIONER

## 2022-09-12 RX ORDER — CYCLOBENZAPRINE HCL 5 MG
5 TABLET ORAL 3 TIMES DAILY PRN
Qty: 90 TABLET | Refills: 0 | Status: SHIPPED | OUTPATIENT
Start: 2022-09-12 | End: 2022-09-20

## 2022-09-12 RX ORDER — METHYLPREDNISOLONE 4 MG/1
TABLET ORAL
Qty: 21 EACH | Refills: 0 | Status: SHIPPED | OUTPATIENT
Start: 2022-09-12 | End: 2022-09-20

## 2022-09-12 RX ORDER — BETAMETHASONE DIPROPIONATE 0.5 MG/G
CREAM TOPICAL 2 TIMES DAILY
Qty: 30 G | Refills: 0 | Status: SHIPPED | OUTPATIENT
Start: 2022-09-12 | End: 2022-09-20

## 2022-09-12 NOTE — PROGRESS NOTES
Assessment and Plan:    Problem List Items Addressed This Visit        Cardiovascular and Mediastinum    Essential hypertension - Primary     Patient blood pressure is quite elevated today  He is in discomfort and had chiropractic manipulation and also cystoscopy today before his appointment  Patient is normally on amlodipine 2 5mg with good control  Reassess blood pressure in 2 weeks  Musculoskeletal and Integument    Skin lesion     Refer to dermatology  Try betamethasone although some concerns for skin cancer given atypical appearance          Relevant Medications    betamethasone dipropionate (DIPROSONE) 0 05 % cream    Other Relevant Orders    Ambulatory Referral to Dermatology       Other    Acute right-sided low back pain without sciatica     Trial medrol with flexeril   Continue with ice          Relevant Medications    methylPREDNISolone 4 MG tablet therapy pack    cyclobenzaprine (FLEXERIL) 5 mg tablet    Other Relevant Orders    POCT urine dip (Completed)    Urine culture                 Diagnoses and all orders for this visit:    Essential hypertension    Acute right-sided low back pain without sciatica  -     POCT urine dip  -     Urine culture; Future  -     Urine culture  -     methylPREDNISolone 4 MG tablet therapy pack; Use as directed on package  -     cyclobenzaprine (FLEXERIL) 5 mg tablet; Take 1 tablet (5 mg total) by mouth 3 (three) times a day as needed for muscle spasms    Skin lesion  -     Ambulatory Referral to Dermatology; Future  -     betamethasone dipropionate (DIPROSONE) 0 05 % cream; Apply topically 2 (two) times a day            Subjective:      Patient ID: Breanna Richardson is a 60 y o  male  CC:    Chief Complaint   Patient presents with    Back Pain     Lower right back pain ongoing since Friday     Nevus     Mole check to left upper thigh, first noticed three months ago  HPI:    HPI     Patient states he has spasm on right lower back     L5 was out with the chiropractor  He has history of 3 lower back surgeries  Standing worse, sitting makes it feel better  He has been taking ibuprofen with little relief  Heat made it worse so he has been using ice which is better  Has some mild left shin numbness reports he seems slightly better since he was at the chiropractor this morning   He was golfing on Thursday  He also states he has bladder scope this morning as well  Patient also has mole that he wants to be elevated  It has been there a few months  He does have family history of skin cancer  No personal history  He has been using neosporin  No change in 3 months  The following portions of the patient's history were reviewed and updated as appropriate: allergies, current medications, past family history, past medical history, past social history, past surgical history and problem list       Review of Systems   Constitutional: Negative  Respiratory: Negative  Cardiovascular: Negative  Musculoskeletal: Positive for back pain and myalgias  Negative for gait problem  Skin: Positive for color change  Lesion          Data to review:       Objective:    Vitals:    09/12/22 1414   BP: 162/80   BP Location: Left arm   Patient Position: Sitting   Cuff Size: Adult   Pulse: 76   Temp: 97 6 °F (36 4 °C)   TempSrc: Temporal   Weight: 79 8 kg (176 lb)   Height: 6' 2 5" (1 892 m)        Physical Exam  Vitals and nursing note reviewed  Constitutional:       General: He is not in acute distress  Appearance: Normal appearance  He is not ill-appearing, toxic-appearing or diaphoretic  HENT:      Head: Normocephalic and atraumatic  Right Ear: External ear normal       Left Ear: External ear normal    Eyes:      Extraocular Movements: Extraocular movements intact  Conjunctiva/sclera: Conjunctivae normal    Cardiovascular:      Rate and Rhythm: Normal rate and regular rhythm        Heart sounds: Normal heart sounds, S1 normal and S2 normal  No murmur heard  Pulmonary:      Effort: Pulmonary effort is normal  No respiratory distress  Breath sounds: Normal breath sounds  No wheezing  Musculoskeletal:      Lumbar back: Spasms (right lower paraspinal above iliac crest ) present  Normal range of motion  Skin:     Findings: Lesion (left upper thigh ) and rash present  Rash is scaling  Neurological:      Mental Status: He is alert and oriented to person, place, and time  Psychiatric:         Mood and Affect: Mood normal          Behavior: Behavior normal          Thought Content:  Thought content normal          Judgment: Judgment normal

## 2022-09-12 NOTE — ASSESSMENT & PLAN NOTE
Refer to dermatology     Try betamethasone although some concerns for skin cancer given atypical appearance

## 2022-09-12 NOTE — ASSESSMENT & PLAN NOTE
Patient blood pressure is quite elevated today  He is in discomfort and had chiropractic manipulation and also cystoscopy today before his appointment  Patient is normally on amlodipine 2 5mg with good control  Reassess blood pressure in 2 weeks

## 2022-09-13 ENCOUNTER — TELEPHONE (OUTPATIENT)
Dept: NEUROSURGERY | Facility: CLINIC | Age: 62
End: 2022-09-13

## 2022-09-13 LAB — BACTERIA UR CULT: NORMAL

## 2022-09-13 NOTE — TELEPHONE ENCOUNTER
I CALLED PATIENT BACK -[phone] AND HE REPORTED THE FOLLOWING SYMPTOMS:  Back pain - Yes, LOWER BACK INTO THE RIGHT HIP AND BUTTOCKS  Leg pain - Yes, right  Pain level: 8-10 / 10  Loss of bowel or bladder control - No  Numbness/tingling: Yes, R>L LEGS AND FEET  Weakness: No  Difficulty walking:   yes     LAST SEEN 12/13/2019 MARTINE HART S/P LAMINOTOMY/FORAMINOTOMY/FACETECTOMY/DISCECTOMY L2-3, RIGHT ON 11/12/2019      NO RECENT IMAGING     SCHEDULED W/PATIENT FOR 9/27/2022 7:15 AM JEREMÍAS CAMPOS

## 2022-09-14 ENCOUNTER — APPOINTMENT (EMERGENCY)
Dept: RADIOLOGY | Facility: HOSPITAL | Age: 62
DRG: 862 | End: 2022-09-14
Payer: COMMERCIAL

## 2022-09-14 ENCOUNTER — HOSPITAL ENCOUNTER (INPATIENT)
Facility: HOSPITAL | Age: 62
LOS: 6 days | Discharge: HOME | DRG: 862 | End: 2022-09-20
Attending: EMERGENCY MEDICINE | Admitting: INTERNAL MEDICINE
Payer: COMMERCIAL

## 2022-09-14 DIAGNOSIS — N40.1 BENIGN NODULAR PROSTATIC HYPERPLASIA WITH LOWER URINARY TRACT SYMPTOMS: ICD-10-CM

## 2022-09-14 DIAGNOSIS — M54.50 LOWER BACK PAIN: ICD-10-CM

## 2022-09-14 DIAGNOSIS — B96.5 BACTEREMIA DUE TO PSEUDOMONAS: ICD-10-CM

## 2022-09-14 DIAGNOSIS — M54.50 ACUTE RIGHT-SIDED LOW BACK PAIN, UNSPECIFIED WHETHER SCIATICA PRESENT: Primary | ICD-10-CM

## 2022-09-14 DIAGNOSIS — R78.81 BACTEREMIA DUE TO PSEUDOMONAS: ICD-10-CM

## 2022-09-14 LAB
ALBUMIN SERPL BCP-MCNC: 3.7 G/DL (ref 3.5–5)
ALP SERPL-CCNC: 54 U/L (ref 46–116)
ALT SERPL W P-5'-P-CCNC: 26 U/L (ref 12–78)
ANION GAP SERPL CALCULATED.3IONS-SCNC: 4 MMOL/L (ref 4–13)
AST SERPL W P-5'-P-CCNC: 11 U/L (ref 5–45)
BACTERIA UR QL AUTO: ABNORMAL /HPF
BASOPHILS # BLD AUTO: 0.03 THOUSANDS/ΜL (ref 0–0.1)
BASOPHILS NFR BLD AUTO: 0 % (ref 0–1)
BILIRUB SERPL-MCNC: 1.55 MG/DL (ref 0.2–1)
BILIRUB UR QL STRIP: NEGATIVE
BUN SERPL-MCNC: 29 MG/DL (ref 5–25)
CALCIUM SERPL-MCNC: 8.6 MG/DL (ref 8.3–10.1)
CHLORIDE SERPL-SCNC: 105 MMOL/L (ref 96–108)
CLARITY UR: CLEAR
CO2 SERPL-SCNC: 28 MMOL/L (ref 21–32)
COLOR UR: ABNORMAL
CREAT SERPL-MCNC: 1.24 MG/DL (ref 0.6–1.3)
EOSINOPHIL # BLD AUTO: 0 THOUSAND/ΜL (ref 0–0.61)
EOSINOPHIL NFR BLD AUTO: 0 % (ref 0–6)
ERYTHROCYTE [DISTWIDTH] IN BLOOD BY AUTOMATED COUNT: 13.3 % (ref 11.6–15.1)
GFR SERPL CREATININE-BSD FRML MDRD: 61 ML/MIN/1.73SQ M
GLUCOSE SERPL-MCNC: 130 MG/DL (ref 65–140)
GLUCOSE UR STRIP-MCNC: NEGATIVE MG/DL
HCT VFR BLD AUTO: 42.3 % (ref 36.5–49.3)
HGB BLD-MCNC: 14.3 G/DL (ref 12–17)
HGB UR QL STRIP.AUTO: ABNORMAL
IMM GRANULOCYTES # BLD AUTO: 0.16 THOUSAND/UL (ref 0–0.2)
IMM GRANULOCYTES NFR BLD AUTO: 1 % (ref 0–2)
KETONES UR STRIP-MCNC: NEGATIVE MG/DL
LEUKOCYTE ESTERASE UR QL STRIP: ABNORMAL
LYMPHOCYTES # BLD AUTO: 1.27 THOUSANDS/ΜL (ref 0.6–4.47)
LYMPHOCYTES NFR BLD AUTO: 6 % (ref 14–44)
MCH RBC QN AUTO: 30 PG (ref 26.8–34.3)
MCHC RBC AUTO-ENTMCNC: 33.8 G/DL (ref 31.4–37.4)
MCV RBC AUTO: 89 FL (ref 82–98)
MONOCYTES # BLD AUTO: 1.44 THOUSAND/ΜL (ref 0.17–1.22)
MONOCYTES NFR BLD AUTO: 6 % (ref 4–12)
MUCOUS THREADS UR QL AUTO: ABNORMAL
NEUTROPHILS # BLD AUTO: 19.68 THOUSANDS/ΜL (ref 1.85–7.62)
NEUTS SEG NFR BLD AUTO: 87 % (ref 43–75)
NITRITE UR QL STRIP: NEGATIVE
NON-SQ EPI CELLS URNS QL MICRO: ABNORMAL /HPF
NRBC BLD AUTO-RTO: 0 /100 WBCS
PH UR STRIP.AUTO: 5 [PH]
PLATELET # BLD AUTO: 213 THOUSANDS/UL (ref 149–390)
PMV BLD AUTO: 9.6 FL (ref 8.9–12.7)
POTASSIUM SERPL-SCNC: 3.9 MMOL/L (ref 3.5–5.3)
PROT SERPL-MCNC: 7.1 G/DL (ref 6.4–8.4)
PROT UR STRIP-MCNC: NEGATIVE MG/DL
RBC # BLD AUTO: 4.76 MILLION/UL (ref 3.88–5.62)
RBC #/AREA URNS AUTO: ABNORMAL /HPF
SODIUM SERPL-SCNC: 137 MMOL/L (ref 135–147)
SP GR UR STRIP.AUTO: 1.02 (ref 1–1.03)
UROBILINOGEN UR STRIP-ACNC: <2 MG/DL
WBC # BLD AUTO: 22.58 THOUSAND/UL (ref 4.31–10.16)
WBC #/AREA URNS AUTO: ABNORMAL /HPF

## 2022-09-14 PROCEDURE — G1004 CDSM NDSC: HCPCS

## 2022-09-14 PROCEDURE — 99285 EMERGENCY DEPT VISIT HI MDM: CPT

## 2022-09-14 PROCEDURE — 96372 THER/PROPH/DIAG INJ SC/IM: CPT

## 2022-09-14 PROCEDURE — 87086 URINE CULTURE/COLONY COUNT: CPT

## 2022-09-14 PROCEDURE — 36415 COLL VENOUS BLD VENIPUNCTURE: CPT

## 2022-09-14 PROCEDURE — 99223 1ST HOSP IP/OBS HIGH 75: CPT | Performed by: INTERNAL MEDICINE

## 2022-09-14 PROCEDURE — 80053 COMPREHEN METABOLIC PANEL: CPT

## 2022-09-14 PROCEDURE — 85025 COMPLETE CBC W/AUTO DIFF WBC: CPT

## 2022-09-14 PROCEDURE — 99254 IP/OBS CNSLTJ NEW/EST MOD 60: CPT | Performed by: NEUROLOGICAL SURGERY

## 2022-09-14 PROCEDURE — 81001 URINALYSIS AUTO W/SCOPE: CPT

## 2022-09-14 PROCEDURE — 72131 CT LUMBAR SPINE W/O DYE: CPT

## 2022-09-14 PROCEDURE — 99285 EMERGENCY DEPT VISIT HI MDM: CPT | Performed by: EMERGENCY MEDICINE

## 2022-09-14 RX ORDER — ONDANSETRON 2 MG/ML
4 INJECTION INTRAMUSCULAR; INTRAVENOUS EVERY 6 HOURS PRN
Status: DISCONTINUED | OUTPATIENT
Start: 2022-09-14 | End: 2022-09-20 | Stop reason: HOSPADM

## 2022-09-14 RX ORDER — KETOROLAC TROMETHAMINE 30 MG/ML
15 INJECTION, SOLUTION INTRAMUSCULAR; INTRAVENOUS EVERY 6 HOURS PRN
Status: DISCONTINUED | OUTPATIENT
Start: 2022-09-14 | End: 2022-09-15

## 2022-09-14 RX ORDER — LIDOCAINE 50 MG/G
1 PATCH TOPICAL DAILY
Status: DISCONTINUED | OUTPATIENT
Start: 2022-09-14 | End: 2022-09-20 | Stop reason: HOSPADM

## 2022-09-14 RX ORDER — OXYCODONE HYDROCHLORIDE 5 MG/1
5 TABLET ORAL EVERY 4 HOURS PRN
Status: DISCONTINUED | OUTPATIENT
Start: 2022-09-14 | End: 2022-09-20 | Stop reason: HOSPADM

## 2022-09-14 RX ORDER — MORPHINE SULFATE 4 MG/ML
4 INJECTION, SOLUTION INTRAMUSCULAR; INTRAVENOUS ONCE
Status: COMPLETED | OUTPATIENT
Start: 2022-09-14 | End: 2022-09-14

## 2022-09-14 RX ORDER — AMLODIPINE BESYLATE 2.5 MG/1
2.5 TABLET ORAL DAILY
Status: DISCONTINUED | OUTPATIENT
Start: 2022-09-14 | End: 2022-09-16

## 2022-09-14 RX ORDER — ACETAMINOPHEN 325 MG/1
975 TABLET ORAL EVERY 8 HOURS SCHEDULED
Status: DISCONTINUED | OUTPATIENT
Start: 2022-09-14 | End: 2022-09-20 | Stop reason: HOSPADM

## 2022-09-14 RX ORDER — KETOROLAC TROMETHAMINE 30 MG/ML
30 INJECTION, SOLUTION INTRAMUSCULAR; INTRAVENOUS ONCE
Status: COMPLETED | OUTPATIENT
Start: 2022-09-14 | End: 2022-09-14

## 2022-09-14 RX ORDER — DEXAMETHASONE SODIUM PHOSPHATE 4 MG/ML
4 INJECTION, SOLUTION INTRA-ARTICULAR; INTRALESIONAL; INTRAMUSCULAR; INTRAVENOUS; SOFT TISSUE EVERY 8 HOURS SCHEDULED
Status: DISCONTINUED | OUTPATIENT
Start: 2022-09-14 | End: 2022-09-15

## 2022-09-14 RX ORDER — DEXAMETHASONE SODIUM PHOSPHATE 4 MG/ML
8 INJECTION, SOLUTION INTRA-ARTICULAR; INTRALESIONAL; INTRAMUSCULAR; INTRAVENOUS; SOFT TISSUE ONCE
Status: COMPLETED | OUTPATIENT
Start: 2022-09-14 | End: 2022-09-14

## 2022-09-14 RX ORDER — HEPARIN SODIUM 5000 [USP'U]/ML
5000 INJECTION, SOLUTION INTRAVENOUS; SUBCUTANEOUS EVERY 8 HOURS SCHEDULED
Status: DISCONTINUED | OUTPATIENT
Start: 2022-09-14 | End: 2022-09-19

## 2022-09-14 RX ORDER — METHOCARBAMOL 500 MG/1
500 TABLET, FILM COATED ORAL EVERY 6 HOURS PRN
Status: DISCONTINUED | OUTPATIENT
Start: 2022-09-14 | End: 2022-09-20 | Stop reason: HOSPADM

## 2022-09-14 RX ORDER — TAMSULOSIN HYDROCHLORIDE 0.4 MG/1
0.4 CAPSULE ORAL DAILY
Status: DISCONTINUED | OUTPATIENT
Start: 2022-09-14 | End: 2022-09-20 | Stop reason: HOSPADM

## 2022-09-14 RX ADMIN — HEPARIN SODIUM 5000 UNITS: 5000 INJECTION INTRAVENOUS; SUBCUTANEOUS at 22:46

## 2022-09-14 RX ADMIN — ACETAMINOPHEN 975 MG: 325 TABLET, FILM COATED ORAL at 15:04

## 2022-09-14 RX ADMIN — KETOROLAC TROMETHAMINE 30 MG: 30 INJECTION, SOLUTION INTRAMUSCULAR at 10:46

## 2022-09-14 RX ADMIN — DEXAMETHASONE SODIUM PHOSPHATE 4 MG: 4 INJECTION INTRA-ARTICULAR; INTRALESIONAL; INTRAMUSCULAR; INTRAVENOUS; SOFT TISSUE at 22:46

## 2022-09-14 RX ADMIN — LIDOCAINE 5% 1 PATCH: 700 PATCH TOPICAL at 12:45

## 2022-09-14 RX ADMIN — HEPARIN SODIUM 5000 UNITS: 5000 INJECTION INTRAVENOUS; SUBCUTANEOUS at 15:03

## 2022-09-14 RX ADMIN — MORPHINE SULFATE 4 MG: 4 INJECTION INTRAVENOUS at 12:45

## 2022-09-14 RX ADMIN — DEXAMETHASONE SODIUM PHOSPHATE 8 MG: 4 INJECTION INTRA-ARTICULAR; INTRALESIONAL; INTRAMUSCULAR; INTRAVENOUS; SOFT TISSUE at 12:43

## 2022-09-14 RX ADMIN — ACETAMINOPHEN 975 MG: 325 TABLET, FILM COATED ORAL at 22:46

## 2022-09-14 RX ADMIN — OXYCODONE HYDROCHLORIDE 5 MG: 5 TABLET ORAL at 20:10

## 2022-09-14 NOTE — TELEPHONE ENCOUNTER
Pt called to advise the office that the pain is worst and he cannot wait until his apt  He is going to the [facility] ER Today

## 2022-09-14 NOTE — H&P
[facility]  H&P- Anival Estrada 1960, 60 y o  male MRN: [id number]  Unit/Bed#: ED 29 Encounter: [id number]  Primary Care Provider: ABNER Infante   Date and time admitted to hospital: 9/14/2022  9:01 AM    * Acute right-sided low back pain  Assessment & Plan  - presents with 5 day history of worsening low right back pain with radiation down the posterior lateral lower right extremity  - denies any saddle anesthesia or bladder/bowel dysfunction suggestive of cauda equina  - CT scan in the ER showing postsurgical changes and L4-L5 and L5-S1 bilateral foraminal narrowing    - patient with history of 3 lumbar back surgeries in the past including most recently a lumbar decompression in 2019    - will need MRI of the lumbar spine with and without contrast  - multimodal pain control with IV Decadron, Tylenol ATC, Toradol p r n , Flexeril p r n , Norco p r n   - neuro surgery consultation    BPH  Assessment & Plan  - continue home Flomax    Essential hypertension  Assessment & Plan  - monitor on home amlodipine      VTE Pharmacologic Prophylaxis: VTE Score: 4 Moderate Risk (Score 3-4) - Pharmacological DVT Prophylaxis Ordered: heparin  Code Status: Level 1 - Full Code   Discussion with family: Patient declined call to   Anticipated Length of Stay: Patient will be admitted on an inpatient basis with an anticipated length of stay of greater than 2 midnights secondary to intractable back pain  Total Time for Visit, including Counseling / Coordination of Care: 45 minutes Greater than 50% of this total time spent on direct patient counseling and coordination of care      Chief Complaint:  Intractable low back pain     History of Present Illness:  Anival Estrada is a 60 y o  male with a PMH of lumbar disc disease status post lumbar surgery x3, hypertension, bladder cancer, BPH, who presents to the [facility] emergency department with acute low back pain   Patient had been doing well following his previous surgery in 2019 until late last week  He was out golfing and subsequently developed right-sided low back pain  He does not remember any traumatic injury or motion that triggered the symptoms  He saw his PCP on Monday and had no relief with prescribed Flexeril and Medrol Dosepak  He initially had plans to see his neurosurgeon in the office, however his pain continued prompting the ER visit  He rates the pain 9/10 with no remitting factors  Seems to be worse with ambulation, though he states that it is pretty constant all the time  In the emergency room initial workup was unrevealing  CT scan does show some L4-L5 and L5-S1 disease but no emergent findings  Case was discussed with Neurosurgery who recommended MRI for further evaluation  Given his ambulatory dysfunction, ongoing pain issues, and need for further imaging he will be admitted to the hospitalist service for further evaluation and management  REVIEW OF SYSTEMS  General Denies fevers or chills  Denies generalized weakness or fatigue  HEENT Denies hearing or vision changes  Cardiovascular Denies chest pain  Denies LE swelling  Denies palpitations  Denies dyspnea on exertion  Respiratory Denies cough  Denies difficulty breathing  Denies shortness of breath  Genitourinary Denies hematuria  Denies dysuria  Denies difficulty voiding  Denies incontinence  Gastrointestinal Denies nausea, vomiting, or diarrhea  Denies hematochezia, melena, or hematemesis  Musculoskeletal Positive for a low right back pain  Positive for radiation of pain down the right lower extremity  Positive for ambulatory dysfunction  Psychiatric  Denies changes in mood  Denies anxiety or depression  Neurologic Denies headache  Denies lightheadedness/dizziness  Denies numbness/tingling  Denies weakness  Endocrine Denies weight loss or weight gain  Denies excessive thirst, sweating, urination       The high with  Past Medical and Surgical History:   Past Medical History:   Diagnosis Date    Cancer ([facility] [address] )     Cancer ([facility] [address] ) 11/04/2019    Bladder 3-4 yrs ago    Chronic pain disorder     Dry skin dermatitis     Dyspepsia     last assessed 09/27/17    Hypertension        Past Surgical History:   Procedure Laterality Date    BACK SURGERY Left     left with foot drop after surgery    BLADDER SURGERY      COLONOSCOPY  2012    Margaret Manning results not known    COLONOSCOPY  05/2020    1 polyp hyperplastic/internal hemorrhoids    EGD  10/15/2015    Nonbleeding erosive gastropathy-biopsy normal   Antral erosions biopsy negative for H  pylori    EGD  09/08/2020    Moderately severe esophagitis otherwise normal   Biopsy esophagus showed acute Candida esophagitis negative for dysplasia or carcinoma negative for herpes simplex virus is negative for cytomegaly a virus  Biopsy stomach negative for H  pylori    EGD  05/2020    One millimeter gastric ulcer-biopsy negative for H  pylori  Erosive gastritis    EGD  09/08/2020    Gastric ulcer resolve the entire stomach normal   Moderate to severe esophagitis-biopsy positive for Candida    POSTERIOR LAMINECTOMY THORACIC AND LUMBAR SPINE Right 12/17/2018    Procedure: LAMINECTOMY LUMBAR LAMINECTOMY, DISCECTOMY, FORAMINOTOMY, L3-4, RIGHT;  Surgeon: Lauren Bess MD;  [facility];  Service: Neurosurgery    [place] LAMNOTMY INCL W/DCMPRSN NRV ROOT 1 INTRSPC LUMBR Right 11/12/2019    Procedure: LUMBAR LAMINOTOMY/FORAMINOTOMY/FACETECTOMY/DISCECTOMY L2-3, RIGHT;  Surgeon: Lauren Bses MD;  Location: [facility];  Service: Neurosurgery       Meds/Allergies:  Prior to Admission medications    Medication Sig Start Date End Date Taking?  Authorizing Provider   acetaminophen (TYLENOL) 650 mg CR tablet Take 650 mg by mouth every 6 (six) hours as needed for mild pain   Yes Historical Provider, MD   amLODIPine (NORVASC) 2 5 mg tablet TAKE ONE TABLET BY MOUTH EVERY DAY 7/21/22  Yes Sydnee Bneítez ABNER Escalera   celecoxib (CeleBREX) 100 mg capsule Take 1 capsule (100 mg total) by mouth 2 (two) times a day 6/9/22  Yes ABNER Hook   cyclobenzaprine (FLEXERIL) 5 mg tablet Take 1 tablet (5 mg total) by mouth 3 (three) times a day as needed for muscle spasms 9/12/22  Yes ABNER Sol   methylPREDNISolone 4 MG tablet therapy pack Use as directed on package 9/12/22  Yes ABNER Sol   tamsulosin (FLOMAX) 0 4 mg Take 1 capsule (0 4 mg total) by mouth daily 6/9/22  Yes ABNER Hook   betamethasone dipropionate (DIPROSONE) 0 05 % cream Apply topically 2 (two) times a day 9/12/22   ABNER Sol   Potassium 75 MG TABS 75-99mg daily at bedtime 6/9/22   ABNER Hook   magnesium oxide (MAG-OX) 400 mg Take 1 tablet (400 mg total) by mouth 2 (two) times a day  Patient not taking: No sig reported 4/7/21 9/14/22  ABNER Sol     I have reviewed home medications using recent Epic encounter  Allergies:    Allergies   Allergen Reactions    Viagra [Sildenafil] Headache       Social History:  Marital Status:    Occupation:  Works with horses and as [employer]   Patient Pre-hospital Living Situation: Home  Patient Pre-hospital Level of Mobility: walks  Patient Pre-hospital Diet Restrictions: none  Substance Use History:   Social History     Substance and Sexual Activity   Alcohol Use Yes     Social History     Tobacco Use   Smoking Status Never Smoker   Smokeless Tobacco Never Used     Social History     Substance and Sexual Activity   Drug Use Yes    Types: Marijuana    Comment: rare       Family History:  Family History   Problem Relation Age of Onset    Stomach cancer Mother     Raynaud syndrome Mother     Heart attack Father     Heart attack Brother        Physical Exam:     Vitals:   Blood Pressure: 145/77 (09/14/22 1058)  Pulse: 75 (09/14/22 1058)  Temperature: 98 2 °F (36 8 °C) (09/14/22 0907)  Temp Source: Oral (09/14/22 7161)  Respirations: 20 (09/14/22 1058)  SpO2: 99 % (09/14/22 1058)    PHYSICAL EXAM:    Vitals signs reviewed  Constitutional   Awake and cooperative  NAD  Head/Neck   Normocephalic  Atraumatic  HEENT   No scleral icterus  EOMI  Heart   Regular rate and rhythm  No murmers  Lungs   Clear to auscultation bilaterally  Respirations unlaboured  Abdomen   Soft  Nontender  Nondistended  Skin   Skin color normal  No rashes  Extremities   Full range of motion in right lower extremity  Straight leg raise negative  No sensation deficits  Neuro   Alert and oriented  No new deficits  Psych   Mood stable  Affect normal          Additional Data:     Lab Results:  Results from last 7 days   Lab Units 09/14/22  1151   WBC Thousand/uL 22 58*   HEMOGLOBIN g/dL 14 3   HEMATOCRIT % 42 3   PLATELETS Thousands/uL 213   NEUTROS PCT % 87*   LYMPHS PCT % 6*   MONOS PCT % 6   EOS PCT % 0     Results from last 7 days   Lab Units 09/14/22  1151   SODIUM mmol/L 137   POTASSIUM mmol/L 3 9   CHLORIDE mmol/L 105   CO2 mmol/L 28   BUN mg/dL 29*   CREATININE mg/dL 1 24   ANION GAP mmol/L 4   CALCIUM mg/dL 8 6   ALBUMIN g/dL 3 7   TOTAL BILIRUBIN mg/dL 1 55*   ALK PHOS U/L 54   ALT U/L 26   AST U/L 11   GLUCOSE RANDOM mg/dL 130                       Imaging: Reviewed radiology reports from this admission including: CT spine  CT spine lumbar without contrast   Final Result by Niko Gonzalez DO (09/14 1057)      Prior right laminectomy at the L2-3 and L3-4 levels and left laminectomy at the L4-5 level  Persistent annular bulging and degenerative change as described above with mild to moderate canal stenosis and mild foraminal narrowing at these levels  At the L4-5 and L5-S1 levels there is annular bulging with endplate and facet changes resulting in moderate bilateral foraminal narrowing  Chronic L5 spondylolysis with grade 1 anterior spondylolisthesis           Workstation performed: [id number]         MRI lumbar spine w wo contrast    (Results Pending)   MRI inpatient order    (Results Pending)       EKG and Other Studies Reviewed on Admission:   · EKG: No EKG obtained  ** Please Note: This note has been constructed using a voice recognition system   **

## 2022-09-14 NOTE — ED PROVIDER NOTES
History  Chief Complaint   Patient presents with    Back Pain     Pt reports right-sided lower back pain starting Friday and a slight burning pain radiating into right leg; denies injury or trauma leading to this event; hx back surgeries in , , ; tried flexeril and steroids x3 days w/o relief     Patient is a 61 y/o male with PMH including 3x back surgeries, lumbar radiculopathy, b/l foot drop, bladder cancer, HTN, HLD presenting with right sided lower back pain  Per patient he was golfing on Thursday  Pain started Friday and has gotten worse  He describes it as 9/10 pain in lower back/hip radiating down leg  He has some numbness of his right leg as well as left leg  No weakness, no fevers, no cauda equina symptoms or bladder incontinence  He does have some mild dysuria after his cystoscopy last week  He finds it very hard to walk but he is able to bear weight  On Monday he went to his PCP who prescribed him Flexeril + Methylprednisolone  He took them Monday and Tuesday, did not find them helpful  He has been taking some Advil as well  He had his last back surgery in  and has been following with neurosurgery, has appointment scheduled for the end of the month  Denies smoking, alcoholic, recreational drug use, IV drug use  He works with race horses, and also is an [employer]   Patient's blood pressure was initially in the 180s on arrival but after evaluation came down, in 140s  Prior to Admission Medications   Prescriptions Last Dose Informant Patient Reported? Taking?    Potassium 75 MG TABS   No No   Si-99mg daily at bedtime   acetaminophen (TYLENOL) 650 mg CR tablet Past Week at Unknown time Self Yes Yes   Sig: Take 650 mg by mouth every 6 (six) hours as needed for mild pain   amLODIPine (NORVASC) 2 5 mg tablet 2022 at Unknown time  No Yes   Sig: TAKE ONE TABLET BY MOUTH EVERY DAY   betamethasone dipropionate (DIPROSONE) 0 05 % cream   No No   Sig: Apply topically 2 (two) times a day   celecoxib (CeleBREX) 100 mg capsule 9/13/2022 at Unknown time  No Yes   Sig: Take 1 capsule (100 mg total) by mouth 2 (two) times a day   cyclobenzaprine (FLEXERIL) 5 mg tablet 9/13/2022 at Unknown time  No Yes   Sig: Take 1 tablet (5 mg total) by mouth 3 (three) times a day as needed for muscle spasms   methylPREDNISolone 4 MG tablet therapy pack 9/13/2022 at Unknown time  No Yes   Sig: Use as directed on package   tamsulosin (FLOMAX) 0 4 mg 9/14/2022 at Unknown time  No Yes   Sig: Take 1 capsule (0 4 mg total) by mouth daily      Facility-Administered Medications: None       Past Medical History:   Diagnosis Date    [facility] ([facility] )     Cancer ([facility] ) 11/04/2019    Bladder 3-4 yrs ago    Chronic pain disorder     Dry skin dermatitis     Dyspepsia     last assessed 09/27/17    Hypertension        Past Surgical History:   Procedure Laterality Date    BACK SURGERY Left     left with foot drop after surgery    BLADDER SURGERY      COLONOSCOPY  2012    Bartolo Ha results not known    COLONOSCOPY  05/2020    1 polyp hyperplastic/internal hemorrhoids    EGD  10/15/2015    Nonbleeding erosive gastropathy-biopsy normal   Antral erosions biopsy negative for H  pylori    EGD  09/08/2020    Moderately severe esophagitis otherwise normal   Biopsy esophagus showed acute Candida esophagitis negative for dysplasia or carcinoma negative for herpes simplex virus is negative for cytomegaly a virus  Biopsy stomach negative for H  pylori    EGD  05/2020    One millimeter gastric ulcer-biopsy negative for H  pylori    Erosive gastritis    EGD  09/08/2020    Gastric ulcer resolve the entire stomach normal   Moderate to severe esophagitis-biopsy positive for Candida    POSTERIOR LAMINECTOMY THORACIC AND LUMBAR SPINE Right 12/17/2018    Procedure: LAMINECTOMY LUMBAR LAMINECTOMY, DISCECTOMY, FORAMINOTOMY, L3-4, RIGHT;  Surgeon: Christina Snell MD;  [facility];  Service: Neurosurgery    HI LAMNOTMY INCL W/DCMPRSN NRV ROOT 1 INTRSPC LUMBR Right 11/12/2019    Procedure: LUMBAR LAMINOTOMY/FORAMINOTOMY/FACETECTOMY/DISCECTOMY L2-3, RIGHT;  Surgeon: Raquel Vega MD;  Location: [facility];  Service: Neurosurgery       Family History   Problem Relation Age of Onset    Stomach cancer Mother     Raynaud syndrome Mother     Heart attack Father     Heart attack Brother      I have reviewed and agree with the history as documented  E-Cigarette/Vaping     E-Cigarette/Vaping Substances     Social History     Tobacco Use    Smoking status: Never Smoker    Smokeless tobacco: Never Used   Substance Use Topics    Alcohol use: Yes    Drug use: Yes     Types: Marijuana     Comment: rare        Review of Systems   Constitutional: Positive for chills  Negative for fatigue, fever and unexpected weight change  HENT: Negative for hearing loss and sore throat  Eyes: Negative for visual disturbance  Respiratory: Negative for cough and shortness of breath  Cardiovascular: Negative for chest pain and palpitations  Gastrointestinal: Negative for abdominal distention, abdominal pain, blood in stool, constipation, diarrhea, nausea and vomiting  Denies perirectal numbness/weakness/incontinence  Endocrine: Negative for polyuria  Genitourinary: Positive for dysuria  Negative for hematuria  Reports mild dysuria over last week   Musculoskeletal: Positive for arthralgias and back pain  Negative for neck pain and neck stiffness  R  Lateral lower back pain radiating down leg  No spinal tenderness  Skin: Negative for rash and wound  Neurological: Positive for numbness  Negative for dizziness, tremors, seizures, weakness, light-headedness and headaches  Numbness in left foot, right leg, sensation intact  Psychiatric/Behavioral: The patient is not nervous/anxious          Physical Exam  ED Triage Vitals [09/14/22 0907]   Temperature Pulse Respirations Blood Pressure SpO2   98 2 °F (36 8 °C) 87 20 (!) 187/92 97 %      Temp Source Heart Rate Source Patient Position - Orthostatic VS BP Location FiO2 (%)   Oral Monitor Lying Left arm --      Pain Score       9             Orthostatic Vital Signs  Vitals:    09/14/22 0907 09/14/22 1058   BP: (!) 187/92 145/77   Pulse: 87 75   Patient Position - Orthostatic VS: Lying Lying       Physical Exam  Vitals reviewed  Constitutional:       Appearance: Normal appearance  HENT:      Head: Normocephalic  Right Ear: External ear normal       Left Ear: External ear normal       Nose: Nose normal       Mouth/Throat:      Mouth: Mucous membranes are moist    Eyes:      Extraocular Movements: Extraocular movements intact  Pupils: Pupils are equal, round, and reactive to light  Cardiovascular:      Rate and Rhythm: Normal rate and regular rhythm  Pulses: Normal pulses  Heart sounds: Normal heart sounds  Pulmonary:      Effort: Pulmonary effort is normal       Breath sounds: Normal breath sounds  Abdominal:      General: Abdomen is flat  Bowel sounds are normal       Palpations: Abdomen is soft  Tenderness: There is no abdominal tenderness  There is no right CVA tenderness or left CVA tenderness  Musculoskeletal:         General: No tenderness  Normal range of motion  Cervical back: Normal range of motion  Right lower leg: No edema  Left lower leg: No edema  Comments: No tenderness to palpation of spine  No tenderness of hip, leg on exam  Full ROM in hip and knee  Uncomfortable standing, and walking  Skin:     General: Skin is warm  Capillary Refill: Capillary refill takes less than 2 seconds  Neurological:      General: No focal deficit present  Mental Status: He is alert and oriented to person, place, and time  Comments: Reports numbness in L  Foot and R  Leg at and below knee  Sensation intact  Unable to elicit patellar/achillies reflexes  Strength intact in all extremities           ED Medications  Medications ondansetron (ZOFRAN) injection 4 mg (has no administration in time range)   heparin (porcine) subcutaneous injection 5,000 Units (has no administration in time range)   amLODIPine (NORVASC) tablet 2 5 mg (has no administration in time range)   tamsulosin (FLOMAX) capsule 0 4 mg (has no administration in time range)   acetaminophen (TYLENOL) tablet 975 mg (has no administration in time range)   dexamethasone (DECADRON) injection 4 mg (has no administration in time range)   lidocaine (LIDODERM) 5 % patch 1 patch (1 patch Topical Medication Applied 9/14/22 1245)   methocarbamol (ROBAXIN) tablet 500 mg (has no administration in time range)   ketorolac (TORADOL) injection 15 mg (has no administration in time range)   oxyCODONE (ROXICODONE) IR tablet 5 mg (has no administration in time range)   ketorolac (TORADOL) injection 30 mg (30 mg Intramuscular Given 9/14/22 1046)   dexamethasone (DECADRON) injection 8 mg (8 mg Intravenous Given 9/14/22 1243)   morphine injection 4 mg (4 mg Intravenous Given 9/14/22 1245)       Diagnostic Studies  Results Reviewed     Procedure Component Value Units Date/Time    Comprehensive metabolic panel [[id number]]  (Abnormal) Collected: 09/14/22 1151    Lab Status: Final result Specimen: Blood from Arm, Left Updated: 09/14/22 1228     Sodium 137 mmol/L      Potassium 3 9 mmol/L      Chloride 105 mmol/L      CO2 28 mmol/L      ANION GAP 4 mmol/L      BUN 29 mg/dL      Creatinine 1 24 mg/dL      Glucose 130 mg/dL      Calcium 8 6 mg/dL      AST 11 U/L      ALT 26 U/L      Alkaline Phosphatase 54 U/L      Total Protein 7 1 g/dL      Albumin 3 7 g/dL      Total Bilirubin 1 55 mg/dL      eGFR 61 ml/min/1 73sq m     Narrative:      [place] guidelines for Chronic Kidney Disease (CKD):     Stage 1 with normal or high GFR (GFR > 90 mL/min/1 73 square meters)    Stage 2 Mild CKD (GFR = 60-89 mL/min/1 73 square meters)    Stage 3A Moderate CKD (GFR = 45-59 mL/min/1 73 square meters)    Stage 3B Moderate CKD (GFR = 30-44 mL/min/1 73 square meters)    Stage 4 Severe CKD (GFR = 15-29 mL/min/1 73 square meters)    Stage 5 End Stage CKD (GFR <15 mL/min/1 73 square meters)  Note: GFR calculation is accurate only with a steady state creatinine    CBC and differential [[id number]]  (Abnormal) Collected: 09/14/22 1151    Lab Status: Final result Specimen: Blood from Arm, Left Updated: 09/14/22 1159     WBC 22 58 Thousand/uL      RBC 4 76 Million/uL      Hemoglobin 14 3 g/dL      Hematocrit 42 3 %      MCV 89 fL      MCH 30 0 pg      MCHC 33 8 g/dL      RDW 13 3 %      MPV 9 6 fL      Platelets 534 Thousands/uL      nRBC 0 /100 WBCs      Neutrophils Relative 87 %      Immat GRANS % 1 %      Lymphocytes Relative 6 %      Monocytes Relative 6 %      Eosinophils Relative 0 %      Basophils Relative 0 %      Neutrophils Absolute 19 68 Thousands/µL      Immature Grans Absolute 0 16 Thousand/uL      Lymphocytes Absolute 1 27 Thousands/µL      Monocytes Absolute 1 44 Thousand/µL      Eosinophils Absolute 0 00 Thousand/µL      Basophils Absolute 0 03 Thousands/µL     Platelet count [[id number]]     Lab Status: No result Specimen: Blood     Urine Microscopic [[id number]]  (Abnormal) Collected: 09/14/22 1058    Lab Status: Final result Specimen: Urine, Clean Catch Updated: 09/14/22 1115     RBC, UA 1-2 /hpf      WBC, UA Innumerable /hpf      Epithelial Cells None Seen /hpf      Bacteria, UA Occasional /hpf      MUCUS THREADS Occasional    Urine culture [[id number]] Collected: 09/14/22 1058    Lab Status:  In process Specimen: Urine, Clean Catch Updated: 09/14/22 1115    UA w Reflex to Microscopic w Reflex to Culture [[id number]]  (Abnormal) Collected: 09/14/22 1058    Lab Status: Final result Specimen: Urine, Clean Catch Updated: 09/14/22 1112     Color, UA Light Yellow     Clarity, UA Clear     Specific Gravity, UA 1 024     pH, UA 5 0     Leukocytes, UA Large     Nitrite, UA Negative     Protein, UA Negative mg/dl      Glucose, UA Negative mg/dl      Ketones, UA Negative mg/dl      Urobilinogen, UA <2 0 mg/dl      Bilirubin, UA Negative     Occult Blood, UA Trace                 CT spine lumbar without contrast   Final Result by Niko Velazquez DO (09/14 1057)      Prior right laminectomy at the L2-3 and L3-4 levels and left laminectomy at the L4-5 level  Persistent annular bulging and degenerative change as described above with mild to moderate canal stenosis and mild foraminal narrowing at these levels  At the L4-5 and L5-S1 levels there is annular bulging with endplate and facet changes resulting in moderate bilateral foraminal narrowing  Chronic L5 spondylolysis with grade 1 anterior spondylolisthesis  Workstation performed: [id number]         MRI lumbar spine w wo contrast    (Results Pending)   MRI inpatient order    (Results Pending)         Procedures  Procedures      ED Course                 SBIRT 20yo+    Flowsheet Row Most Recent Value   SBIRT (23 yo +)    In order to provide better care to our patients, we are screening all of our patients for alcohol and drug use  Would it be okay to ask you these screening questions? No Filed at: 09/14/2022 0911                MDM  Number of Diagnoses or Management Options  Lower back pain  Diagnosis management comments: Patient has dysuria, will order UA to eval for UTI  CT lumbar spine to workup for possible malignancy, as patient has history of bladder cancer  Consulted neurosurgery as he is known to them with last procedure in 2019  Toradol for pain  Discussed with neurosurgery, they recommend MRI, patient will be [facility] to [facility]         Amount and/or Complexity of Data Reviewed  Clinical lab tests: ordered and reviewed  Tests in the radiology section of CPT®: ordered and reviewed        Disposition  Final diagnoses:   Lower back pain     Time reflects when diagnosis was documented in both MDM as applicable and the Disposition within this note     Time User Action Codes Description Comment    9/14/2022 10:25 AM Mina Shove Add [M54 50] Lower back pain     9/14/2022 11:03 AM Mina Shove Add [M54 50] Acute low back pain     9/14/2022 11:04 AM Mina Shove Modify [M54 50] Lower back pain     9/14/2022 11:04 AM Mina Shove Modify [M54 50] Acute low back pain     9/14/2022 11:04 AM Mina Shove Modify [M54 50] Acute low back pain     9/14/2022 11:04 AM Mina Shove Modify [M54 50] Lower back pain     9/14/2022 11:04 AM Mina Shove Remove [M54 50] Acute low back pain       ED Disposition     ED Disposition   Admit    Condition   Stable    Date/Time   Wed Sep 14, 2022 11:56 AM    Comment              Follow-up Information    None         Patient's Medications   Discharge Prescriptions    No medications on file     Outpatient Discharge Orders   MRI lumbar spine w wo contrast   Standing Status: Future Standing Exp  Date: 09/14/26       PDMP Review       Value Time User    PDMP Reviewed  Yes 11/12/2019  9:52 AM Sylvie Vee PA-C           ED Provider  Attending physically available and evaluated Leela Stafford  I managed the patient along with the ED Attending      Electronically Signed by Charles Dumont, PGY1, Internal Medicine Residency         Charles uDmont MD  09/14/22 [address] Saul MD  09/14/22 1340

## 2022-09-14 NOTE — APP STUDENT NOTE
Acute right-sided low back pain with sciatica  Assessment and Plan  - Pt has history of lumbar radiculopathy and 3 back surgeries, most recent one in Oct  2019 for L2-L3 lumbar decompression, no complications  - Since surgery patient has had recurring back pain on and off  - CT lumbar spine wo contrast in ED revealed bulging and degenerative changes at L4-L5 and L5-S1 levels, chronic spondylosis and grade 1 anterior spondylolisthesis   - Currently awaiting MRI  - Initiate pain control regimen     Dysuria  Assessment and Plan  - Mild dysuria after his annual cystoscopy last week  - Follows with urology annually for hx of bladder cancer, dx in 2019    Essential Hypertension  Assessment and Plan  - BP on ED arrival was 187/145  - BP after evaluation came down to 145/77  - Continue home dose of amlodipine (Norvasc) 2 5mg PO daily     VTE Pharmacologic Prophylaxis:   Low Risk (Score 0-2) - Encourage Ambulation  Code Status: Level 1 - Full Code   Discussion with family: Patient declined call to   Anticipated Length of Stay: Patient will be admitted on an observation basis with an anticipated length of stay of less than 2 midnights secondary to MRI pending, uncontrolled acute back pain with sciatica  Total Time for Visit, including Counseling / Coordination of Care: 30 minutes Greater than 50% of this total time spent on direct patient counseling and coordination of care  Chief Complaint: Acute right-sided low back pain     History of Present Illness:  Fabiola Collins is a 60 y o  male with a PMH of 3x back surgeries, lumbar radiculopathy, b/l foot drop, bladder cancer, HTN, HLD who presents with right sided low back pain x 6 days  Patient states that he was golfing last Thursday, and noticed a slight twinge in his back but did not think anything of it  Pain started Friday in his right lower back/buttock radiating down his leg into the thigh, knee, shin, and top of foot and has been a constant 9/10  Has has some numbness in both legs and feet as well, has been unable to drive and took an Darra Borck to the ED today  He denies fevers, headache, muscle weakness, bladder or bowel incontinence  He states he has had pain like this in the past but never for this long without relief  On Monday he went to his PCP who prescribed him Flexeril + Methylprednisolone which did not provide him relief, made him nauseous  Has not taken these medications since Tuesday  He has been taking Advil as well  Patients last back surgery was in 2019 for lumbar decompression of L2-L3, and has been following with neurosurgery, next appointment scheduled at the end of the month  Patient drinks a beer once in a while and smokes marijuana recreationally  Denies tobacco, nicotine, or illicit drug use  Review of Systems:  Review of Systems   Constitutional: Negative for chills, diaphoresis, fever and unexpected weight change  Eyes: Negative for visual disturbance  Respiratory: Negative for chest tightness and shortness of breath  Cardiovascular: Negative for chest pain and palpitations  Gastrointestinal: Negative for abdominal pain and rectal pain  Genitourinary: Positive for dysuria  Negative for hematuria  Musculoskeletal: Positive for back pain  Negative for neck pain and neck stiffness  Neurological: Positive for numbness  Negative for dizziness, seizures, weakness, light-headedness and headaches  Numbness in both legs and feet  Sensation intact  Psychiatric/Behavioral: Negative for agitation and sleep disturbance         Past Medical and Surgical History:   Past Medical History:   Diagnosis Date    [facility]     [facility] ([facility] ) 11/04/2019    Bladder 3-4 yrs ago    Chronic pain disorder     Dry skin dermatitis     Dyspepsia     last assessed 09/27/17    Hypertension        Past Surgical History:   Procedure Laterality Date    BACK SURGERY Left     left with foot drop after surgery    BLADDER SURGERY      COLONOSCOPY  2012    [facility] results not known    COLONOSCOPY  05/2020    1 polyp hyperplastic/internal hemorrhoids    EGD  10/15/2015    Nonbleeding erosive gastropathy-biopsy normal   Antral erosions biopsy negative for H  pylori    EGD  09/08/2020    Moderately severe esophagitis otherwise normal   Biopsy esophagus showed acute Candida esophagitis negative for dysplasia or carcinoma negative for herpes simplex virus is negative for cytomegaly a virus  Biopsy stomach negative for H  pylori    EGD  05/2020    One millimeter gastric ulcer-biopsy negative for H  pylori  Erosive gastritis    EGD  09/08/2020    Gastric ulcer resolve the entire stomach normal   Moderate to severe esophagitis-biopsy positive for Candida    POSTERIOR LAMINECTOMY THORACIC AND LUMBAR SPINE Right 12/17/2018    Procedure: LAMINECTOMY LUMBAR LAMINECTOMY, DISCECTOMY, FORAMINOTOMY, L3-4, RIGHT;  Surgeon: Azael Castellano MD;  Location:  MAIN OR;  Service: Neurosurgery    [place] LAMNOTMY INCL W/DCMPRSN NRV ROOT 1 INTRSPC LUMBR Right 11/12/2019    Procedure: LUMBAR LAMINOTOMY/FORAMINOTOMY/FACETECTOMY/DISCECTOMY L2-3, RIGHT;  Surgeon: Azael Castellano MD;  Location:  MAIN OR;  Service: Neurosurgery       Meds/Allergies:  Prior to Admission medications    Medication Sig Start Date End Date Taking?  Authorizing Provider   acetaminophen (TYLENOL) 650 mg CR tablet Take 650 mg by mouth every 6 (six) hours as needed for mild pain   Yes Historical Provider, MD   amLODIPine (NORVASC) 2 5 mg tablet TAKE ONE TABLET BY MOUTH EVERY DAY 7/21/22  Yes ABNER Yeboah   celecoxib (CeleBREX) 100 mg capsule Take 1 capsule (100 mg total) by mouth 2 (two) times a day 6/9/22  Yes ABNER Hook   cyclobenzaprine (FLEXERIL) 5 mg tablet Take 1 tablet (5 mg total) by mouth 3 (three) times a day as needed for muscle spasms 9/12/22  Yes ABNER Yeboah   methylPREDNISolone 4 MG tablet therapy pack Use as directed on package 9/12/22  Yes Genet Alvarado ABNER Escalera   tamsulosin (FLOMAX) 0 4 mg Take 1 capsule (0 4 mg total) by mouth daily 6/9/22  Yes Nanci MERAZ ABNER Escalera   betamethasone dipropionate (DIPROSONE) 0 05 % cream Apply topically 2 (two) times a day 9/12/22   ABNER Lucas   Potassium 75 MG TABS 75-99mg daily at bedtime 6/9/22   ABNER Lucas   magnesium oxide (MAG-OX) 400 mg Take 1 tablet (400 mg total) by mouth 2 (two) times a day  Patient not taking: No sig reported 4/7/21 9/14/22  ABNER Lucas     I have reviewed home medications with patient personally  Allergies: Allergies   Allergen Reactions    Viagra [Sildenafil] Headache       Social History:  Marital Status:    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  Patient Pre-hospital Living Situation: Home  Patient Pre-hospital Level of Mobility: walks  Patient Pre-hospital Diet Restrictions: None  Substance Use History:   Social History     Substance and Sexual Activity   Alcohol Use Yes     Social History     Tobacco Use   Smoking Status Never Smoker   Smokeless Tobacco Never Used     Social History     Substance and Sexual Activity   Drug Use Yes    Types: Marijuana    Comment: rare       Family History:  Family History   Problem Relation Age of Onset    Stomach cancer Mother     Raynaud syndrome Mother     Heart attack Father     Heart attack Brother        Physical Exam:     Vitals:   Blood Pressure: 145/77 (09/14/22 1058)  Pulse: 75 (09/14/22 1058)  Temperature: 98 2 °F (36 8 °C) (09/14/22 0907)  Temp Source: Oral (09/14/22 0907)  Respirations: 20 (09/14/22 1058)  SpO2: 99 % (09/14/22 1058)    Physical Exam  Constitutional:       Appearance: He is not ill-appearing  HENT:      Head: Normocephalic and atraumatic  Eyes:      Extraocular Movements: Extraocular movements intact  Cardiovascular:      Rate and Rhythm: Normal rate and regular rhythm  Pulses: Normal pulses     Pulmonary:      Effort: Pulmonary effort is normal  Breath sounds: Normal breath sounds  Abdominal:      General: Bowel sounds are normal    Musculoskeletal:         General: Tenderness present  Normal range of motion  Cervical back: Normal range of motion and neck supple  Comments: Tenderness in lower right side of back/buttock   Skin:     General: Skin is warm and dry  Neurological:      Mental Status: He is alert and oriented to person, place, and time  Psychiatric:         Mood and Affect: Mood normal          Additional Data:     Lab Results:  Results from last 7 days   Lab Units 09/14/22  1151   WBC Thousand/uL 22 58*   HEMOGLOBIN g/dL 14 3   HEMATOCRIT % 42 3   PLATELETS Thousands/uL 213   NEUTROS PCT % 87*   LYMPHS PCT % 6*   MONOS PCT % 6   EOS PCT % 0                           Imaging: Personally reviewed the following imaging: CT lumbar spine  CT spine lumbar without contrast   Final Result by Niko Collazo DO (09/14 4887)      Prior right laminectomy at the L2-3 and L3-4 levels and left laminectomy at the L4-5 level  Persistent annular bulging and degenerative change as described above with mild to moderate canal stenosis and mild foraminal narrowing at these levels  At the L4-5 and L5-S1 levels there is annular bulging with endplate and facet changes resulting in moderate bilateral foraminal narrowing  Chronic L5 spondylolysis with grade 1 anterior spondylolisthesis  Workstation performed: [id number]         MRI lumbar spine w wo contrast    (Results Pending)   MRI inpatient order    (Results Pending)       EKG and Other Studies Reviewed on Admission:   · EKG: No EKG obtained  ** Please Note: This note has been constructed using a voice recognition system   **

## 2022-09-14 NOTE — ED ATTENDING ATTESTATION
9/14/2022  Chio MORFIN, DO, saw and evaluated the patient  I have discussed the patient with the resident/non-physician practitioner and agree with the resident's/non-physician practitioner's findings, Plan of Care, and MDM as documented in the resident's/non-physician practitioner's note, except where noted  All available labs and Radiology studies were reviewed  I was present for key portions of any procedure(s) performed by the resident/non-physician practitioner and I was immediately available to provide assistance  At this point I agree with the current assessment done in the Emergency Department  I have conducted an independent evaluation of this patient a history and physical is as follows:      60 yom with r lower back pain, radiating to right  Pain started after golfing 6 days ago  No saddle anesthesia, no urinary retention  Pain with start of urination, monthly cystoscopies  Was also evaluated by PCP two days ago and received steroids and cyclobenzaprine  Past Medical History:   Diagnosis Date    Cancer ([facility] )     Cancer ([facility] ) 11/04/2019    Bladder 3-4 yrs ago    Chronic pain disorder     Dry skin dermatitis     Dyspepsia     last assessed 09/27/17    Hypertension      BP (!) 187/92 (BP Location: Left arm)   Pulse 87   Temp 98 2 °F (36 8 °C) (Oral)   Resp 20   SpO2 97%   Uncomfortable, A&Ox4, RRR, no resp distress, abd soft/NT, ext NROM    Pain not reproducible on exam but localized to right lower back/SI joint area  UA, CT L spine, d/w NS         ED Course         Critical Care Time  Procedures

## 2022-09-14 NOTE — TELEPHONE ENCOUNTER
Charlee Galindo after message was received regarding patients status and indicating he was going to the ED  Did not yet see an ED encounter so contacted the patient  He is currently on his way to the ED  He states he can not await his visit  Explained that the scheduled appt would be for assessment only and to determine if advanced imaging was needed and that if he is having uncontrolled intolerable pain he should follow through with his plan to go to the ED  He was appreciative of the call back

## 2022-09-14 NOTE — ASSESSMENT & PLAN NOTE
Patient comes in with acute right buttock / back pain with radiation into RLE since Friday  · S/p LAMINECTOMY LUMBAR LAMINECTOMY, DISCECTOMY, FORAMINOTOMY, L3-4, RIGHT (12/17/2018, Dr Elly Brenner)  · S/p LUMBAR LAMINOTOMY/FORAMINOTOMY/FACETECTOMY/DISCECTOMY L2-3, RIGHT (11/12/2019, Dr Elly Brenner)  · Known bilateral foot drop; no additional red flag signs noted    Imaging reviewed personally and with attending, results are as follows:  · CT spine lumbar wo contrast 9/14/2022:  Prior right laminectomy at the L2-3 and L3-4 levels and left laminectomy at the L4-5 level  Persistent annular bulging and degenerative change as described above with mild to moderate canal stenosis and mild foraminal narrowing at these levels  At the L4-5 and L5-S1 levels there is annular bulging with endplate and facet changes resulting in moderate bilateral foraminal narrowing  Chronic L5 spondylolysis with grade 1 anterior spondylolisthesis    Plan:   Continue to monitor neuro exam, no red flag signs, no new weakness / numbness / tingling, no bowel / bladder issues   Imaging reviewed, no acute findings appreciated  Will order MRI lumbar spine w wo contrast to further evaluate for possible acute compressive finding however given his stable exam do anticipate conservative management will be definite treatment   Medical management and pain control per primary team  o Should be on multimodal pain regimen   DVT ppx:  SCDs, heparin sq   Mobilize as tolerated with assistance, PT / OT evaluation    Neurosurgery will follow up after completion of imaging  Please call with questions or concerns

## 2022-09-14 NOTE — CONSULTS
[address] 1960, 60 y o  male MRN: [id number]  Unit/Bed#: ED 29 Encounter: [id number]  Primary Care Provider: ABNER Smyth   Date and time admitted to hospital: 9/14/2022  9:01 AM    Inpatient consult to Neurosurgery  Consult performed by: Mary Klein PA-C  Consult ordered by: Fuentes Peters DO          * Acute right-sided low back pain  Assessment & Plan  Patient comes in with acute right buttock / back pain with radiation into RLE since Friday  · S/p LAMINECTOMY LUMBAR LAMINECTOMY, DISCECTOMY, FORAMINOTOMY, L3-4, RIGHT (12/17/2018, Dr Kym Augustine)  · S/p LUMBAR LAMINOTOMY/FORAMINOTOMY/FACETECTOMY/DISCECTOMY L2-3, RIGHT (11/12/2019, Dr Kym Augustine)  · Known bilateral foot drop; no additional red flag signs noted    Imaging reviewed personally and with attending, results are as follows:  · CT spine lumbar wo contrast 9/14/2022:  Prior right laminectomy at the L2-3 and L3-4 levels and left laminectomy at the L4-5 level  Persistent annular bulging and degenerative change as described above with mild to moderate canal stenosis and mild foraminal narrowing at these levels  At the L4-5 and L5-S1 levels there is annular bulging with endplate and facet changes resulting in moderate bilateral foraminal narrowing  Chronic L5 spondylolysis with grade 1 anterior spondylolisthesis    Plan:   Continue to monitor neuro exam, no red flag signs, no new weakness / numbness / tingling, no bowel / bladder issues   Imaging reviewed, no acute findings appreciated  Will order MRI lumbar spine w wo contrast to further evaluate for possible acute compressive finding however given his stable exam do anticipate conservative management will be definite treatment     Medical management and pain control per primary team  o Should be on multimodal pain regimen   DVT ppx:  SCDs, heparin sq   Mobilize as tolerated with assistance, PT / OT evaluation    Neurosurgery will follow up after completion of imaging  Please call with questions or concerns  History of Present Illness   HPI: Giuseppe Shah is a 60y o  year old male with PMH including bladder cancer, HTN, prior lumbar surgery x 2, baseline chronic bilateral foot drop who presents with complaint of acute onset back pain with RLE pain  Patient states he was in his usual state of health a few days ago, played golf and the following day he gradually developed worsening right sided back / buttock pain that radiated into lateral shin skipping the thigh region  Pain is worse with standing and better with rest  He states he tried to go to [facility] for their hot tub to ease his pain but this did not work  He called his PCP Monday who gave him steroids and flexeril without significant relief  He called [address] [place] [address] office yesterday and was given appointment on 9/27/2022 for evaluation  Today he finally came for evaluation in the ED as he could not wait for his scheduled appointment in the office  Continues to have pain as described but feels slightly better as he has been given pain meds  He has no new weakness / numbness / tingling other than his baseline foot drop bilaterally with some numbness in his feet  He uses braces for his feet although does not have those with him currently  No bowel / bladder issues or saddle anesthesia  He has not ever had injections  No recent PT  He denies any significant trauma recently to have caused this  Review of Systems   Constitutional: Positive for activity change  Negative for chills and fever  HENT: Negative for hearing loss and trouble swallowing  Eyes: Negative for visual disturbance  Respiratory: Negative for chest tightness and shortness of breath  Cardiovascular: Negative for chest pain  Gastrointestinal: Negative for abdominal pain, constipation, diarrhea, nausea and vomiting     Genitourinary: Negative for difficulty urinating  Musculoskeletal: Positive for back pain (RLE pain) and gait problem  Negative for neck pain  Skin: Negative for wound  Allergic/Immunologic: Negative for environmental allergies and food allergies  Neurological: Positive for weakness (baseline bilateral foot drop) and numbness (bilateral feet, chronic)  Negative for dizziness, facial asymmetry, speech difficulty and headaches  Hematological: Does not bruise/bleed easily  Psychiatric/Behavioral: Negative for confusion  Historical Information   Past Medical History:   Diagnosis Date    [facility] )     Cancer ([facility] ) 11/04/2019    Bladder 3-4 yrs ago    Chronic pain disorder     Dry skin dermatitis     Dyspepsia     last assessed 09/27/17    Hypertension      Past Surgical History:   Procedure Laterality Date    BACK SURGERY Left     left with foot drop after surgery    BLADDER SURGERY      COLONOSCOPY  2012    Radu Pat results not known    COLONOSCOPY  05/2020    1 polyp hyperplastic/internal hemorrhoids    EGD  10/15/2015    Nonbleeding erosive gastropathy-biopsy normal   Antral erosions biopsy negative for H  pylori    EGD  09/08/2020    Moderately severe esophagitis otherwise normal   Biopsy esophagus showed acute Candida esophagitis negative for dysplasia or carcinoma negative for herpes simplex virus is negative for cytomegaly a virus  Biopsy stomach negative for H  pylori    EGD  05/2020    One millimeter gastric ulcer-biopsy negative for H  pylori    Erosive gastritis    EGD  09/08/2020    Gastric ulcer resolve the entire stomach normal   Moderate to severe esophagitis-biopsy positive for Candida    POSTERIOR LAMINECTOMY THORACIC AND LUMBAR SPINE Right 12/17/2018    Procedure: LAMINECTOMY LUMBAR LAMINECTOMY, DISCECTOMY, FORAMINOTOMY, L3-4, RIGHT;  Surgeon: Evelyn Hughes MD;  [facility];  Service: Neurosurgery    WY LAMNOTMY INCL W/DCMPRSN NRV ROOT 1 INTRSPC LUMBR Right 11/12/2019    Procedure: LUMBAR LAMINOTOMY/FORAMINOTOMY/FACETECTOMY/DISCECTOMY L2-3, RIGHT;  Surgeon: Raquel Vega MD;  Location: [facility];  Service: Neurosurgery     Social History     Substance and Sexual Activity   Alcohol Use Yes     Social History     Substance and Sexual Activity   Drug Use Yes    Types: Marijuana    Comment: rare     Social History     Tobacco Use   Smoking Status Never Smoker   Smokeless Tobacco Never Used     Family History   Problem Relation Age of Onset    Stomach cancer Mother     Raynaud syndrome Mother     Heart attack Father     Heart attack Brother        Meds/Allergies   all current active meds have been reviewed, current meds:   Current Facility-Administered Medications   Medication Dose Route Frequency    acetaminophen (TYLENOL) tablet 975 mg  975 mg Oral Q8H [address]    amLODIPine (NORVASC) tablet 2 5 mg  2 5 mg Oral Daily    dexamethasone (DECADRON) injection 4 mg  4 mg Intravenous Q8H [address]    heparin (porcine) subcutaneous injection 5,000 Units  5,000 Units Subcutaneous Q8H [address]    ketorolac (TORADOL) injection 15 mg  15 mg Intravenous Q6H PRN    lidocaine (LIDODERM) 5 % patch 1 patch  1 patch Topical Daily    methocarbamol (ROBAXIN) tablet 500 mg  500 mg Oral Q6H PRN    ondansetron (ZOFRAN) injection 4 mg  4 mg Intravenous Q6H PRN    oxyCODONE (ROXICODONE) IR tablet 5 mg  5 mg Oral Q4H PRN    tamsulosin (FLOMAX) capsule 0 4 mg  0 4 mg Oral Daily    and PTA meds:   Prior to Admission Medications   Prescriptions Last Dose Informant Patient Reported? Taking?    Potassium 75 MG TABS   No No   Si-99mg daily at bedtime   acetaminophen (TYLENOL) 650 mg CR tablet Past Week at Unknown time Self Yes Yes   Sig: Take 650 mg by mouth every 6 (six) hours as needed for mild pain   amLODIPine (NORVASC) 2 5 mg tablet 2022 at Unknown time  No Yes   Sig: TAKE ONE TABLET BY MOUTH EVERY DAY   betamethasone dipropionate (DIPROSONE) 0 05 % cream   No No   Sig: Apply topically 2 (two) times a day   celecoxib (CeleBREX) 100 mg capsule 9/13/2022 at Unknown time  No Yes   Sig: Take 1 capsule (100 mg total) by mouth 2 (two) times a day   cyclobenzaprine (FLEXERIL) 5 mg tablet 9/13/2022 at Unknown time  No Yes   Sig: Take 1 tablet (5 mg total) by mouth 3 (three) times a day as needed for muscle spasms   methylPREDNISolone 4 MG tablet therapy pack 9/13/2022 at Unknown time  No Yes   Sig: Use as directed on package   tamsulosin (FLOMAX) 0 4 mg 9/14/2022 at Unknown time  No Yes   Sig: Take 1 capsule (0 4 mg total) by mouth daily      Facility-Administered Medications: None     Allergies   Allergen Reactions    Viagra [Sildenafil] Headache       Objective   I/O     None          Physical Exam  Constitutional:       General: He is awake  Appearance: Normal appearance  HENT:      Head: Normocephalic and atraumatic  Eyes:      Extraocular Movements: EOM normal       Conjunctiva/sclera: Conjunctivae normal    Cardiovascular:      Rate and Rhythm: Normal rate  Pulmonary:      Effort: Pulmonary effort is normal  No respiratory distress  Musculoskeletal:      Cervical back: No tenderness  Thoracic back: No tenderness  Lumbar back: No tenderness  Back:    Skin:     General: Skin is warm and dry  Neurological:      Mental Status: He is alert and oriented to person, place, and time  Coordination: Finger-Nose-Finger Test normal       Deep Tendon Reflexes:      Reflex Scores:       Patellar reflexes are 1+ on the right side and 1+ on the left side  Psychiatric:         Attention and Perception: Attention and perception normal          Mood and Affect: Mood and affect normal          Speech: Speech normal          Behavior: Behavior normal  Behavior is cooperative  Thought Content: Thought content normal          Cognition and Memory: Cognition and memory normal          Judgment: Judgment normal        Neurologic Exam     Mental Status   Oriented to person, place, and time     Follows 1 step commands  Attention: normal  Concentration: normal    Speech: speech is normal   Level of consciousness: alert  Knowledge: good  Normal comprehension  Cranial Nerves     CN III, IV, VI   Extraocular motions are normal    CN III: no CN III palsy  CN VI: no CN VI palsy  Nystagmus: none   Diplopia: none  Ophthalmoparesis: none  Upgaze: normal  Downgaze: normal  Conjugate gaze: present    CN V   Right facial sensation deficit: none  Left facial sensation deficit: none    CN VII   Right facial weakness: none  Left facial weakness: none    CN VIII   Hearing: intact    CN IX, X   CN IX normal    CN X normal      CN XI   Right trapezius strength: normal  Left trapezius strength: normal    CN XII   CN XII normal      Motor Exam   Muscle bulk: normal  Overall muscle tone: normal  Right arm pronator drift: absent  Left arm pronator drift: absent    Strength   Strength 5/5 except as noted  Chronic bilateral foot drop unchanged  Otherwise moving BLE with full strength throughout     Sensory Exam   Right arm light touch: normal  Left arm light touch: normal  Right leg light touch: decreased from ankle  Left leg light touch: decreased from ankle  DST intact     Gait, Coordination, and Reflexes     Coordination   Finger to nose coordination: normal    Tremor   Resting tremor: absent  Intention tremor: absent  Action tremor: absent    Reflexes   Right patellar: 1+  Left patellar: 1+  Right : 2+  Left : 2+  Right Zarate: absent  Left Zarate: absent  Right ankle clonus: absent  Left ankle clonus: absent      Vitals:Blood pressure 145/77, pulse 75, temperature 98 2 °F (36 8 °C), temperature source Oral, resp  rate 20, SpO2 99 %  ,There is no height or weight on file to calculate BMI       Lab Results:   Results from last 7 days   Lab Units 09/14/22  1151   WBC Thousand/uL 22 58*   HEMOGLOBIN g/dL 14 3   HEMATOCRIT % 42 3   PLATELETS Thousands/uL 213   NEUTROS PCT % 87*   MONOS PCT % 6     Results from last 7 days Lab Units 09/14/22  1151   POTASSIUM mmol/L 3 9   CHLORIDE mmol/L 105   CO2 mmol/L 28   BUN mg/dL 29*   CREATININE mg/dL 1 24   CALCIUM mg/dL 8 6   ALK PHOS U/L 54   ALT U/L 26   AST U/L 11       Imaging Studies: I have personally reviewed pertinent reports  and I have personally reviewed pertinent films in PACS    CT spine lumbar without contrast    Result Date: 9/14/2022  Impression: Prior right laminectomy at the L2-3 and L3-4 levels and left laminectomy at the L4-5 level  Persistent annular bulging and degenerative change as described above with mild to moderate canal stenosis and mild foraminal narrowing at these levels  At the L4-5 and L5-S1 levels there is annular bulging with endplate and facet changes resulting in moderate bilateral foraminal narrowing  Chronic L5 spondylolysis with grade 1 anterior spondylolisthesis  Workstation performed: [id number]     EKG, Pathology, and Other Studies: I have personally reviewed pertinent reports  VTE Prophylaxis: Sequential compression device (Venodyne)  and Heparin    Code Status: Level 1 - Full Code  Advance Directive and Living Will:      Power of :    POLST:      Counseling / Coordination of Care  I spent 20 minutes with the patient

## 2022-09-14 NOTE — ASSESSMENT & PLAN NOTE
- presents with 5 day history of worsening low right back pain with radiation down the posterior lateral lower right extremity  - denies any saddle anesthesia or bladder/bowel dysfunction suggestive of cauda equina  - CT scan in the ER showing postsurgical changes and L4-L5 and L5-S1 bilateral foraminal narrowing    - patient with history of 3 lumbar back surgeries in the past including most recently a lumbar decompression in 2019    - will need MRI of the lumbar spine with and without contrast  - multimodal pain control with IV Decadron, Tylenol ATC, Toradol p r n , Flexeril p r n , Norco p r n   - neuro surgery consultation

## 2022-09-15 ENCOUNTER — TELEPHONE (OUTPATIENT)
Dept: NEUROSURGERY | Facility: CLINIC | Age: 62
End: 2022-09-15

## 2022-09-15 ENCOUNTER — APPOINTMENT (OUTPATIENT)
Dept: RADIOLOGY | Facility: HOSPITAL | Age: 62
DRG: 862 | End: 2022-09-15
Payer: COMMERCIAL

## 2022-09-15 LAB
ANION GAP SERPL CALCULATED.3IONS-SCNC: 6 MMOL/L (ref 4–13)
BACTERIA UR CULT: NORMAL
BUN SERPL-MCNC: 32 MG/DL (ref 5–25)
CALCIUM SERPL-MCNC: 8.7 MG/DL (ref 8.3–10.1)
CHLORIDE SERPL-SCNC: 104 MMOL/L (ref 96–108)
CO2 SERPL-SCNC: 26 MMOL/L (ref 21–32)
CREAT SERPL-MCNC: 1 MG/DL (ref 0.6–1.3)
ERYTHROCYTE [DISTWIDTH] IN BLOOD BY AUTOMATED COUNT: 13.3 % (ref 11.6–15.1)
GFR SERPL CREATININE-BSD FRML MDRD: 79 ML/MIN/1.73SQ M
GLUCOSE SERPL-MCNC: 125 MG/DL (ref 65–140)
HCT VFR BLD AUTO: 42.9 % (ref 36.5–49.3)
HGB BLD-MCNC: 13.9 G/DL (ref 12–17)
INR PPP: 1.08 (ref 0.84–1.19)
MCH RBC QN AUTO: 29.8 PG (ref 26.8–34.3)
MCHC RBC AUTO-ENTMCNC: 32.4 G/DL (ref 31.4–37.4)
MCV RBC AUTO: 92 FL (ref 82–98)
PLATELET # BLD AUTO: 207 THOUSANDS/UL (ref 149–390)
PMV BLD AUTO: 9.9 FL (ref 8.9–12.7)
POTASSIUM SERPL-SCNC: 4.4 MMOL/L (ref 3.5–5.3)
PROTHROMBIN TIME: 14.2 SECONDS (ref 11.6–14.5)
RBC # BLD AUTO: 4.66 MILLION/UL (ref 3.88–5.62)
SODIUM SERPL-SCNC: 136 MMOL/L (ref 135–147)
WBC # BLD AUTO: 25.76 THOUSAND/UL (ref 4.31–10.16)

## 2022-09-15 PROCEDURE — 85027 COMPLETE CBC AUTOMATED: CPT | Performed by: INTERNAL MEDICINE

## 2022-09-15 PROCEDURE — G1004 CDSM NDSC: HCPCS

## 2022-09-15 PROCEDURE — 72158 MRI LUMBAR SPINE W/O & W/DYE: CPT

## 2022-09-15 PROCEDURE — 85610 PROTHROMBIN TIME: CPT | Performed by: INTERNAL MEDICINE

## 2022-09-15 PROCEDURE — 80048 BASIC METABOLIC PNL TOTAL CA: CPT | Performed by: INTERNAL MEDICINE

## 2022-09-15 PROCEDURE — 97163 PT EVAL HIGH COMPLEX 45 MIN: CPT

## 2022-09-15 PROCEDURE — A9585 GADOBUTROL INJECTION: HCPCS | Performed by: INTERNAL MEDICINE

## 2022-09-15 PROCEDURE — 97166 OT EVAL MOD COMPLEX 45 MIN: CPT

## 2022-09-15 PROCEDURE — 99232 SBSQ HOSP IP/OBS MODERATE 35: CPT | Performed by: INTERNAL MEDICINE

## 2022-09-15 PROCEDURE — 99232 SBSQ HOSP IP/OBS MODERATE 35: CPT | Performed by: NEUROLOGICAL SURGERY

## 2022-09-15 RX ORDER — ALPRAZOLAM 0.5 MG/1
0.5 TABLET ORAL ONCE
Status: COMPLETED | OUTPATIENT
Start: 2022-09-15 | End: 2022-09-15

## 2022-09-15 RX ORDER — KETOROLAC TROMETHAMINE 30 MG/ML
15 INJECTION, SOLUTION INTRAMUSCULAR; INTRAVENOUS EVERY 6 HOURS PRN
Status: DISCONTINUED | OUTPATIENT
Start: 2022-09-15 | End: 2022-09-16

## 2022-09-15 RX ADMIN — ALPRAZOLAM 0.5 MG: 0.5 TABLET ORAL at 03:35

## 2022-09-15 RX ADMIN — DEXAMETHASONE SODIUM PHOSPHATE 4 MG: 4 INJECTION INTRA-ARTICULAR; INTRALESIONAL; INTRAMUSCULAR; INTRAVENOUS; SOFT TISSUE at 16:07

## 2022-09-15 RX ADMIN — LIDOCAINE 5% 1 PATCH: 700 PATCH TOPICAL at 09:15

## 2022-09-15 RX ADMIN — AMLODIPINE BESYLATE 2.5 MG: 2.5 TABLET ORAL at 09:15

## 2022-09-15 RX ADMIN — GADOBUTROL 7 ML: 604.72 INJECTION INTRAVENOUS at 03:19

## 2022-09-15 RX ADMIN — ACETAMINOPHEN 975 MG: 325 TABLET, FILM COATED ORAL at 14:50

## 2022-09-15 RX ADMIN — KETOROLAC TROMETHAMINE 15 MG: 30 INJECTION, SOLUTION INTRAMUSCULAR at 16:07

## 2022-09-15 RX ADMIN — ACETAMINOPHEN 975 MG: 325 TABLET, FILM COATED ORAL at 22:12

## 2022-09-15 RX ADMIN — ACETAMINOPHEN 975 MG: 325 TABLET, FILM COATED ORAL at 05:14

## 2022-09-15 RX ADMIN — KETOROLAC TROMETHAMINE 15 MG: 30 INJECTION, SOLUTION INTRAMUSCULAR at 22:19

## 2022-09-15 RX ADMIN — DEXAMETHASONE SODIUM PHOSPHATE 4 MG: 4 INJECTION INTRA-ARTICULAR; INTRALESIONAL; INTRAMUSCULAR; INTRAVENOUS; SOFT TISSUE at 05:17

## 2022-09-15 NOTE — UTILIZATION REVIEW
Initial Clinical Review    Admission: Date/Time/Statement:   Admission Orders (From admission, onward)     Ordered        09/14/22 1153  Inpatient Admission  Once                      Orders Placed This Encounter   Procedures    Inpatient Admission     Standing Status:   Standing     Number of Occurrences:   1     Order Specific Question:   Level of Care     Answer:   Med Surg [16]     Order Specific Question:   Estimated length of stay     Answer:   More than 2 Midnights     Order Specific Question:   Certification     Answer:   I certify that inpatient services are medically necessary for this patient for a duration of greater than two midnights  See H&P and MD Progress Notes for additional information about the patient's course of treatment  ED Arrival Information     Expected   -    Arrival   9/14/2022 08:55    Acuity   Urgent            Means of arrival   Walk-In    Escorted by   Self    Service   Hospitalist    Admission type   Urgent            Arrival complaint   back pain           Chief Complaint   Patient presents with    Back Pain     Pt reports right-sided lower back pain starting Friday and a slight burning pain radiating into right leg; denies injury or trauma leading to this event; hx back surgeries in 2002, 2018, 2019; tried flexeril and steroids x3 days w/o relief       Initial Presentation: 60 y o  male with PMH of lumbar disc disease s/p lumbar surg x 3, HTN, bladder ca, BPH presented to the ED from home asa walk in w/ acute low back pain  Pt was out golfing when he developed R sided low back pain  He saw his PCP and was prescribed Flexeril and Medrol dose pack w/ no relief  Described pain as constant,  9/10 in intensity, no remitting factors, worse w/ ambulation  His pain continued and prompted him to go to the ED  In the ED, CT scan does show some L4-L5 and L5-S1 disease but no emergent findings  Given IV Toradol, IV Morphine and IV Decadron      Plan: Inpatient admission for evaluation and treatment of acute right sided low back pain: MRI of the lumbar spine, multimodal pain control w/ Tylenol ATC, Toradol p r n , Flexeril p r n , Norco p r n , IV Decadron  Neurosurg consult    09/14 Neurosurg Consult; Acute R sided low back apin: Cont to mon neuro exam  MRI of lumbar spine w/ and w/o contrast ordered to further evaluate for possible acute compressive finding  Multimodal  Pain regimen  DVT ppx  PT/OT  Date: 09/15  Day 2:   Neurosurg Notes: Pt reports improvement in pain compared from yesterday  Improved discomfort w/ improved in ambulation ability  Stable BLE foot drop weakness  Mri without significant disease  Recommend conservative measures: cont pain management, PT/OT, DVT ppx  On exam, aaox3  Moving all extremities 5/5 strength w/ b/l foot drop  ED Triage Vitals [09/14/22 0907]   Temperature Pulse Respirations Blood Pressure SpO2   98 2 °F (36 8 °C) 87 20 (!) 187/92 97 %      Temp Source Heart Rate Source Patient Position - Orthostatic VS BP Location FiO2 (%)   Oral Monitor Lying Left arm --      Pain Score       9          Wt Readings from Last 1 Encounters:   09/14/22 79 4 kg (175 lb)     Additional Vital Signs:   Date/Time Temp Pulse Resp BP MAP (mmHg) SpO2 O2 Device Patient Position - Orthostatic VS   09/15/22 0845 98 2 °F (36 8 °C) 69 12 145/79 95 95 % None (Room air) Sitting   09/14/22 2235 98 3 °F (36 8 °C) 61 18 134/72 96 91 % None (Room air) Lying   09/14/22 1546 98 4 °F (36 9 °C) 65 20 154/84 106 94 % None (Room air) Lying   09/14/22 1511 -- 69 20 155/83 -- 97 % None (Room air) Lying   09/14/22 1504 -- -- -- 155/83 -- -- -- --   09/14/22 1327 -- -- -- -- -- -- None (Room air) --   09/14/22 1058 -- 75 20 145/77 -- 99 % None (Room air) Lying       Pertinent Labs/Diagnostic Test Results:   MRI lumbar spine w wo contrast   Final Result by Niko Triplett DO (09/15 5219)      Postoperative change at L2-3 and L3-4 with prior laminectomies, new since prior examination  Improvement in the previously seen right-sided disc extrusion at L2-3  At L3-4 there is slight distortion of the right anterolateral aspect of the thecal sac    due to a new small right paramedian disc extrusion  Stable postoperative change at L4-5 without canal stenosis  Stable mild foraminal narrowing  Stable chronic L5 spondylolysis and spondylolisthesis with mild bilateral foraminal narrowing  Workstation performed: [id number]         CT spine lumbar without contrast   Final Result by Niko Hodgson DO (09/14 1057)      Prior right laminectomy at the L2-3 and L3-4 levels and left laminectomy at the L4-5 level  Persistent annular bulging and degenerative change as described above with mild to moderate canal stenosis and mild foraminal narrowing at these levels  At the L4-5 and L5-S1 levels there is annular bulging with endplate and facet changes resulting in moderate bilateral foraminal narrowing  Chronic L5 spondylolysis with grade 1 anterior spondylolisthesis           Workstation performed: [id number]         MRI lumbar spine w wo contrast    (Results Pending)         Results from last 7 days   Lab Units 09/15/22  0524 09/14/22  1151   WBC Thousand/uL 25 76* 22 58*   HEMOGLOBIN g/dL 13 9 14 3   HEMATOCRIT % 42 9 42 3   PLATELETS Thousands/uL 207 213   NEUTROS ABS Thousands/µL  --  19 68*         Results from last 7 days   Lab Units 09/15/22  0524 09/14/22  1151   SODIUM mmol/L 136 137   POTASSIUM mmol/L 4 4 3 9   CHLORIDE mmol/L 104 105   CO2 mmol/L 26 28   ANION GAP mmol/L 6 4   BUN mg/dL 32* 29*   CREATININE mg/dL 1 00 1 24   EGFR ml/min/1 73sq m 79 61   CALCIUM mg/dL 8 7 8 6     Results from last 7 days   Lab Units 09/14/22  1151   AST U/L 11   ALT U/L 26   ALK PHOS U/L 54   TOTAL PROTEIN g/dL 7 1   ALBUMIN g/dL 3 7   TOTAL BILIRUBIN mg/dL 1 55*         Results from last 7 days   Lab Units 09/15/22  0524 09/14/22  1151   GLUCOSE RANDOM mg/dL 125 130       Results from last 7 days   Lab Units 09/15/22  0524   PROTIME seconds 14 2   INR  1 08       Results from last 7 days   Lab Units 09/14/22  1058 09/12/22  1421   CLARITY UA  Clear clear   COLOR UA  Light Yellow yellow   SPEC GRAV UA  1 024  --    PH UA  5 0  --    GLUCOSE UA mg/dl Negative neg   KETONES UA mg/dl Negative neg   BLOOD UA  Trace* mod   PROTEIN UA mg/dl Negative neg   NITRITE UA  Negative neg   BILIRUBIN UA  Negative  --    BILIRUBIN UA POC   --  neg   UROBILINOGEN UA   --  0 2   UROBILINOGEN UA (BE) mg/dl <2 0  --    LEUKOCYTES UA  Large* trace   WBC UA /hpf Innumerable*  --    RBC UA /hpf 1-2  --    BACTERIA UA /hpf Occasional  --    EPITHELIAL CELLS WET PREP /hpf None Seen  --    MUCUS THREADS  Occasional*  --        Results from last 7 days   Lab Units 09/14/22  1058 09/12/22  1421   URINE CULTURE  No Growth <1000 cfu/mL No Growth <1000 cfu/mL               ED Treatment:   Medication Administration from 09/14/2022 0855 to 09/14/2022 1518       Date/Time Order Dose Route Action     09/14/2022 1046 ketorolac (TORADOL) injection 30 mg 30 mg Intramuscular Given     09/14/2022 1503 heparin (porcine) subcutaneous injection 5,000 Units 5,000 Units Subcutaneous Given     09/14/2022 1504 acetaminophen (TYLENOL) tablet 975 mg 975 mg Oral Given     09/14/2022 1243 dexamethasone (DECADRON) injection 8 mg 8 mg Intravenous Given     09/14/2022 1245 morphine injection 4 mg 4 mg Intravenous Given     09/14/2022 1245 lidocaine (LIDODERM) 5 % patch 1 patch 1 patch Topical Medication Applied        Past Medical History:   Diagnosis Date    [facility]     [facility]    Bladder 3-4 yrs ago    Chronic pain disorder     Dry skin dermatitis     Dyspepsia     last assessed 09/27/17    Hypertension      Present on Admission:   Acute right-sided low back pain   BPH   Essential hypertension      Admitting Diagnosis: Back pain [M54 9]  Lower back pain [M54 50]  Age/Sex: 60 y o  male  Admission Orders:  SCD  PT/OT    Scheduled Medications:  acetaminophen, 975 mg, Oral, Q8H [address]  amLODIPine, 2 5 mg, Oral, Daily  dexamethasone, 4 mg, Intravenous, Q8H [place]  heparin (porcine), 5,000 Units, Subcutaneous, Q8H [place]  lidocaine, 1 patch, Topical, Daily  tamsulosin, 0 4 mg, Oral, Daily      Continuous IV Infusions: none     PRN Meds:  ketorolac, 15 mg, Intravenous, Q6H PRN 09/15 x 1  methocarbamol, 500 mg, Oral, Q6H PRN  ondansetron, 4 mg, Intravenous, Q6H PRN  oxyCODONE, 5 mg, Oral, Q4H PRN 09/14 x 1        IP CONSULT TO NEUROSURGERY    Network Utilization Review Department  ATTENTION: Please call with any questions or concerns to [phone] and carefully listen to the prompts so that you are directed to the right person  All voicemails are confidential   Saad Barr all requests for admission clinical reviews, approved or denied determinations and any other requests to dedicated fax number below belonging to the campus where the patient is receiving treatment   List of dedicated fax numbers for the Facilities:  [address] DENIALS (Administrative/Medical Necessity) [phone]   [address] (Maternity/NICU/Pediatrics) [phone]   [address]  [address] [facility] [place] [phone] [place] [address] [address] [facility] [address] [phone]

## 2022-09-15 NOTE — ASSESSMENT & PLAN NOTE
- presents with 5 day history of worsening low right back pain with radiation down the posterior lateral lower right extremity  - denies any saddle anesthesia or bladder/bowel dysfunction suggestive of cauda equina  - CT scan in the ER showing postsurgical changes and L4-L5 and L5-S1 bilateral foraminal narrowing    - patient with history of 3 lumbar back surgeries in the past including most recently a lumbar decompression in 2019    - multimodal pain control with IV Decadron, Tylenol ATC, Toradol p r n , Flexeril p r n , Norco p r n   - MRA without significant new findings continue conservative management including pain control, physical therapy  - continue serial neurologic exams  - seen by Neurosurgery, no surgical indications

## 2022-09-15 NOTE — OCCUPATIONAL THERAPY NOTE
Occupational Therapy Evaluation     Patient Name: Sumit Armenta  RTVBI'R Date: 9/15/2022  Problem List  Principal Problem:    Acute right-sided low back pain  Active Problems:    Essential hypertension    BPH    Past Medical History  Past Medical History:   Diagnosis Date    Cancer ([facility] [address] )     Cancer ([facility] [address] ) 11/04/2019    Bladder 3-4 yrs ago    Chronic pain disorder     Dry skin dermatitis     Dyspepsia     last assessed 09/27/17    Hypertension      Past Surgical History  Past Surgical History:   Procedure Laterality Date    BACK SURGERY Left     left with foot drop after surgery    BLADDER SURGERY      COLONOSCOPY  2012    Lender Pals results not known    COLONOSCOPY  05/2020    1 polyp hyperplastic/internal hemorrhoids    EGD  10/15/2015    Nonbleeding erosive gastropathy-biopsy normal   Antral erosions biopsy negative for H  pylori    EGD  09/08/2020    Moderately severe esophagitis otherwise normal   Biopsy esophagus showed acute Candida esophagitis negative for dysplasia or carcinoma negative for herpes simplex virus is negative for cytomegaly a virus  Biopsy stomach negative for H  pylori    EGD  05/2020    One millimeter gastric ulcer-biopsy negative for H  pylori    Erosive gastritis    EGD  09/08/2020    Gastric ulcer resolve the entire stomach normal   Moderate to severe esophagitis-biopsy positive for Candida    POSTERIOR LAMINECTOMY THORACIC AND LUMBAR SPINE Right 12/17/2018    Procedure: LAMINECTOMY LUMBAR LAMINECTOMY, DISCECTOMY, FORAMINOTOMY, L3-4, RIGHT;  Surgeon: Shani Camacho MD;  [facility]: [facility];  Service: Neurosurgery    TX LAMNOTMY INCL W/DCMPRSN NRV ROOT 1 INTRSPC LUMBR Right 11/12/2019    Procedure: LUMBAR LAMINOTOMY/FORAMINOTOMY/FACETECTOMY/DISCECTOMY L2-3, RIGHT;  Surgeon: Shani Camacho MD;  Location: [facility];  Service: Neurosurgery         09/15/22 1115   OT Last Visit   OT Visit Date 09/15/22   Note Type   Note type Evaluation   Restrictions/Precautions   Weight Bearing Precautions Per Order No   Other Precautions Telemetry; Fall Risk;Pain  (+B/Lfoot drop left>right (modified B/L AFO's at baseline-not present with evaluation))   Pain Assessment   Pain Assessment Tool 0-10   Pain Score 10 - Worst Possible Pain   Pain Location/Orientation Orientation: Bilateral;Location: Back   Hospital Pain Intervention(s) Ambulation/increased activity; Emotional support; Rest   Home Living   Type of [address] Two level; Laundry in basement;Bed/bath upstairs;1/2 bath on main level   Bathroom Shower/Tub Tub/shower unit   Bathroom Toilet Standard   Bathroom Equipment (No DME at baseline)   Bathroom Accessibility Accessible   Additional Comments pt lives in a [facility] 1/2 bathroom on first, laundry in basement +8 [address]   Prior Function   Level of [place] Independent with ADLs and functional mobility   Lives With [place] Help From Family   ADL Assistance Independent   IADLs Independent   Falls in the last 6 months 0   Vocational Full time employment   Lifestyle   Autonomy I with ADL's/IADL's -shares with spouse, no AD with functional ambulation, +drives, works full time     Reciprocal Relationships spouse, daughter (lives in [place])   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)   Intrinsic Gratification golfing/horses   ADL   Eating Assistance 7  Independent   Grooming Assistance 7  Independent   UB [address] 5  Supervision/Setup   LB Bathing Assistance 5  Supervision/Setup   UB Dressing Assistance 5  Supervision/Setup   LB Dressing Assistance 5  Supervision/Setup   LB Dressing Deficit (able to don/doff socks/shoes)   Toileting Assistance  5  Supervision/Setup   Bed Mobility   Supine to Sit 6  Modified independent   Additional items Assist x 1   Sit to Supine 6  Modified independent   Additional items Assist x 1   Transfers   Sit to Stand 5  Supervision   Additional items Assist x 1   Stand to Sit 5  Supervision   Additional items Assist x 1   Additional Comments No AD with functional ambulation   Functional Mobility   Functional Mobility 5  Supervision   Additional Comments Min a for CG/CS --CS with household functional mobility into hallway, back pain limiting farther mobility/safety +one seated rest break in hallway chair with quick impulsive uncentered sit with CG for safety  Pt educated on transfer safety  Balance   Static Sitting Good   Dynamic Sitting Fair +   Static Standing Fair   Dynamic Standing Fair   Ambulatory Fair -   Activity Tolerance   Activity Tolerance Patient limited by fatigue;Patient limited by pain   Medical Staff Made Aware PT [place], SPT gosia due to the patient's co-morbidities, clinically unstable presentation, and present impairments which are a regression from the patient's baseline  Nurse Made Aware RN cleared pt for therapy   RUE Assessment   RUE Assessment WFL   LUE Assessment   LUE Assessment WFL   Hand Function   Gross Motor Coordination Functional   Fine Motor Coordination Functional   Sensation   Light Touch No apparent deficits   Cognition   Arousal/Participation Alert; Responsive; Cooperative   Attention Attends with cues to redirect   Orientation Level Oriented X4   Memory Within functional limits   Following Commands Follows multistep commands with increased time or repetition   Comments pt motivated for therapy, good memory, impulsive with decreased safety/judgement at times during evaluation -suspect 2* to prior [place] levels  Assessment   Assessment Pt is a 60 y o  male who [facility] with Acute right-sided low back pain slight burning pain radiating into right leg; denies injury or trauma leading to this event; hx back surgeries in 2002, 2018, 2019; tried flexeril and steroids x3 days w/o relief, neurosurgery no surgical interventions at this time   Pt's problem list also includes PMH of  has a past medical history of Cancer ([facility] [address] ), Cancer ([facility] [address] ) (11/04/2019), Chronic pain disorder, Dry skin dermatitis, Dyspepsia, and Hypertension    At baseline pt was completing I with ADl's/IADL's, no AD with functional ambulation, drives, works full time  Pt lives with spouse in a [facility] with 1/2 bathroom on first, bed/full bathroom on 2nd floor with 8STE  Currently pt requires S/set-up for overall ADLS and min a for CG/CS without AD for functional mobility/transfers  Pt currently presents with impairments in the following categories -steps to enter environment, limited home support and limited insight into deficits activity tolerance, endurance and standing balance/tolerance  These impairments, as well as pt's fatigue, pain, decreased caregiver support and risk for falls  limit pt's ability to safely engage in all baseline areas of occupation, includingfunctional mobility/transfers, community mobility, laundry , driving, house maintenance, medication management, meal prep, cleaning, work/volunteer work , social participation  and leisure activities  The patient's raw score on the AM-PAC Daily Activity inpatient short form is 24, standardized score is 57 54, greater than 39 4  Patients at this level are likely to benefit from discharge to post-acute rehabilitation services  Please refer to the recommendation of the Occupational Therapist for safe discharge planning     From OT standpoint, recommend home with increased family support upon D/C No further acute OT needs indicated at this time - Anticipate d/c home with family support when medically cleared - d/c from caseload    Goals   Patient Goals less back pain   Recommendation   OT Discharge Recommendation No rehabilitation needs   Equipment Recommended Shower/Tub chair with back ($)   AM-PAC Daily Activity Inpatient   Lower Body Dressing 4   Bathing 4   Toileting 4   Upper Body Dressing 4   Grooming 4   Eating 4   Daily Activity Raw Score 24   Daily Activity Standardized Score (Calc for Raw Score >=11) 57 54   AM-PAC Applied Cognition Inpatient   Following a Speech/Presentation 3   Understanding Ordinary Conversation 4   Taking Medications 4   Remembering Where Things Are Placed or Put Away 4   Remembering List of 4-5 Errands 4   Taking Care of Complicated Tasks 4   Applied Cognition Raw Score 23   Applied Cognition Standardized Score 53 08   Diane Costa MOT, OTR/L

## 2022-09-15 NOTE — PROGRESS NOTES
[facility]  Progress Note - Malcom Krabbe 1960, 60 y o  male MRN: [id number]  Unit/Bed#: [address] 322-01 Encounter: [id number]  Primary Care Provider: ABNER Emery Res   Date and time admitted to hospital: 9/14/2022  9:01 AM    BPH  Assessment & Plan  - continue home Flomax    Essential hypertension  Assessment & Plan  Elevated suspect secondary to the pain component  Continue home amlodipine    * Acute right-sided low back pain  Assessment & Plan  - presents with 5 day history of worsening low right back pain with radiation down the posterior lateral lower right extremity  - denies any saddle anesthesia or bladder/bowel dysfunction suggestive of cauda equina  - CT scan in the ER showing postsurgical changes and L4-L5 and L5-S1 bilateral foraminal narrowing    - patient with history of 3 lumbar back surgeries in the past including most recently a lumbar decompression in 2019    - multimodal pain control with IV Decadron, Tylenol ATC, Toradol p r n , Flexeril p r n , Norco p r n   - MRA without significant new findings continue conservative management including pain control, physical therapy  - continue serial neurologic exams  - seen by Neurosurgery, no surgical indications          VTE Pharmacologic Prophylaxis: VTE Score: 4 Moderate Risk (Score 3-4) - Pharmacological DVT Prophylaxis Ordered: heparin  Patient Centered Rounds: I performed bedside rounds with nursing staff today  Discussions with Specialists or Other Care Team Provider: NS    Education and Discussions with Family / Patient: Patient declined call to   Time Spent for Care: 30 minutes  More than 50% of total time spent on counseling and coordination of care as described above      Current Length of Stay: 1 day(s)  Current Patient Status: Inpatient   Certification Statement: The patient will continue to require additional inpatient hospital stay due to Pain control, physical therapy  Discharge Plan: Anticipate discharge in 24-48 hrs to discharge location to be determined pending rehab evaluations  Code Status: Level 1 - Full Code    Subjective:   Patient seen and examined at bedside  Reports episodes of severe pain overnight controlled medication  Today patient states pain remains however able to ambulate  Denies any weakness or sensory      Objective:     Vitals:   Temp (24hrs), Av 5 °F (36 9 °C), Min:98 2 °F (36 8 °C), Max:99 °F (37 2 °C)    Temp:  [98 2 °F (36 8 °C)-99 °F (37 2 °C)] 99 °F (37 2 °C)  HR:  [61-73] 73  Resp:  [12-18] 14  BP: (134-162)/(72-82) 162/82  SpO2:  [91 %-95 %] 91 %  There is no height or weight on file to calculate BMI  Input and Output Summary (last 24 hours):   No intake or output data in the 24 hours ending 09/15/22 1815    Physical Exam:   Physical Exam  Constitutional:       General: He is not in acute distress  Appearance: Normal appearance  He is not ill-appearing  Cardiovascular:      Rate and Rhythm: Normal rate and regular rhythm  Pulmonary:      Effort: Pulmonary effort is normal  No respiratory distress  Abdominal:      General: Abdomen is flat  Palpations: Abdomen is soft  Musculoskeletal:      Comments: Gait not tested secondary to pain   Neurological:      General: No focal deficit present  Mental Status: He is alert and oriented to person, place, and time  Sensory: No sensory deficit  Motor: No weakness            Additional Data:     Labs:  Results from last 7 days   Lab Units 09/15/22  0524 22  1151   WBC Thousand/uL 25 76* 22 58*   HEMOGLOBIN g/dL 13 9 14 3   HEMATOCRIT % 42 9 42 3   PLATELETS Thousands/uL 207 213   NEUTROS PCT %  --  87*   LYMPHS PCT %  --  6*   MONOS PCT %  --  6   EOS PCT %  --  0     Results from last 7 days   Lab Units 09/15/22  0524 22  1151   SODIUM mmol/L 136 137   POTASSIUM mmol/L 4 4 3 9   CHLORIDE mmol/L 104 105   CO2 mmol/L 26 28   BUN mg/dL 32* 29* CREATININE mg/dL 1 00 1 24   ANION GAP mmol/L 6 4   CALCIUM mg/dL 8 7 8 6   ALBUMIN g/dL  --  3 7   TOTAL BILIRUBIN mg/dL  --  1 55*   ALK PHOS U/L  --  54   ALT U/L  --  26   AST U/L  --  11   GLUCOSE RANDOM mg/dL 125 130     Results from last 7 days   Lab Units 09/15/22  0524   INR  1 08                   Lines/Drains:  Invasive Devices  Report    Peripheral Intravenous Line  Duration           Peripheral IV 09/14/22 Left;Proximal;Ventral (anterior) Forearm 1 day                      Imaging: Reviewed radiology reports from this admission including: MRI spine    Recent Cultures (last 7 days):   Results from last 7 days   Lab Units 09/14/22  1058 09/12/22  1421   URINE CULTURE  No Growth <1000 cfu/mL No Growth <1000 cfu/mL       Last 24 Hours Medication List:   Current Facility-Administered Medications   Medication Dose Route Frequency Provider Last Rate    acetaminophen  975 mg Oral [facility] Julia Neptune Starsinic, DO      amLODIPine  2 5 mg Oral Daily Julia Neptune Starsinic, DO      heparin (porcine)  5,000 Units Subcutaneous [facility] Julia Neptune Starsinic, DO      ketorolac  15 mg Intravenous Q6H PRN Brady Schulz MD      lidocaine  1 patch Topical Daily Julia Neptune Starsinic, DO      methocarbamol  500 mg Oral Q6H PRN Julia Neptune Starsinic, DO      ondansetron  4 mg Intravenous Q6H PRN Julia Neptune Starsinic, DO      oxyCODONE  5 mg Oral Q4H PRN Julia Neptune Starsinic, DO      tamsulosin  0 4 mg Oral Daily Julia Neptune Starsinic, DO          Today, Patient Was Seen By: Brady Schulz MD    **Please Note: This note may have been constructed using a voice recognition system  **

## 2022-09-15 NOTE — PROGRESS NOTES
[address] [facility]  Progress Note - Dewaynefo Rash 1960, 60 y o  male MRN: [id number]  Unit/Bed#: [facility] 322-01 Encounter: [id number]  Primary Care Provider: Jefferson Yun   Date and time admitted to hospital: 9/14/2022  9:01 AM    * Acute right-sided low back pain  Assessment & Plan  Patient comes in with acute right buttock / back pain with radiation into RLE since Friday  · S/p LAMINECTOMY LUMBAR LAMINECTOMY, DISCECTOMY, FORAMINOTOMY, L3-4, RIGHT (12/17/2018, Dr Marquita Miranda)  · S/p LUMBAR LAMINOTOMY/FORAMINOTOMY/FACETECTOMY/DISCECTOMY L2-3, RIGHT (11/12/2019, Dr Marquita Miranda)  · Known bilateral foot drop; no additional red flag signs noted    Imaging:  · MRI lumbar spine 9/14/2022: post operative change at L2-3 and L3-4  Improvement in previously seen right sided disc extrusion  Slight distorion of the right anterolateral aspect of the thecal sac without severe foraminal stenosis  Stable post op change at L4-5 without canal or significant foraminal stenosis  L5 spondylolisthesis with bilateral narrowing chronic in nature     Plan:   Continue to monitor neuro exam   Imaging reviewed, no acute findings appreciated   Mri without significant disease  Recommend conservative measures with pain control on oral meds, PT and pain management referral at [place]   Medical management and pain control per primary team  o Should be on multimodal pain regimen   DVT ppx:  SCDs, heparin sq   Mobilize as tolerated with assistance, PT / OT evaluation   Will reschedule outpatient appointment as this is not required at this time with patient being evaluated in the hospital  Will schedule a clinical follow up after trial on conservative measures  Neurosurgery will sign off at this time  Please call with questions or concerns  Subjective/Objective   Chief Complaint: pain     Subjective: patient reports improved pain compared to prior  Stable BLE foot drop weakness   Uses braces at baseline which are at home  Improved discomfort with improved ambulation ability  No new issues at this time     Objective: laying in bed in NAD    I/O     None          Invasive Devices  Report    Peripheral Intravenous Line  Duration           Peripheral IV 09/14/22 Left;Proximal;Ventral (anterior) Forearm 1 day                Physical Exam:  Vitals: Blood pressure 162/82, pulse 73, temperature 99 °F (37 2 °C), temperature source Oral, resp  rate 14, SpO2 91 %  ,There is no height or weight on file to calculate BMI  General appearance: alert, appears stated age, cooperative and no distress  Head: Normocephalic, without obvious abnormality, atraumatic  Eyes: EOMI, PERRL  Neck: supple, symmetrical, trachea midline   Back: no kyphosis present  Lungs: non labored breathing  Heart: regular heart rate  Neurologic:   Mental status: Alert, oriented x3, thought content appropriate  Cranial nerves: grossly intact (Cranial nerves II-XII)  Sensory: normal to light touch   Motor: moving all extremities 5/5 strength with bilateral foot drop  Preserved PF    Lab Results:  Results from last 7 days   Lab Units 09/15/22  0524 09/14/22  1151   WBC Thousand/uL 25 76* 22 58*   HEMOGLOBIN g/dL 13 9 14 3   HEMATOCRIT % 42 9 42 3   PLATELETS Thousands/uL 207 213   NEUTROS PCT %  --  87*   MONOS PCT %  --  6     Results from last 7 days   Lab Units 09/15/22  0524 09/14/22  1151   POTASSIUM mmol/L 4 4 3 9   CHLORIDE mmol/L 104 105   CO2 mmol/L 26 28   BUN mg/dL 32* 29*   CREATININE mg/dL 1 00 1 24   CALCIUM mg/dL 8 7 8 6   ALK PHOS U/L  --  54   ALT U/L  --  26   AST U/L  --  11             Results from last 7 days   Lab Units 09/15/22  0524   INR  1 08     No results found for: TROPONINT  ABG:No results found for: PHART, RWG0JNC, PO2ART, QYG8ADR, D6FMRZDC, BEART, SOURCE    Imaging Studies: I have personally reviewed pertinent reports     and I have personally reviewed pertinent films in PACS  CT spine lumbar without contrast    Result Date: 9/14/2022  Impression: Prior right laminectomy at the L2-3 and L3-4 levels and left laminectomy at the L4-5 level  Persistent annular bulging and degenerative change as described above with mild to moderate canal stenosis and mild foraminal narrowing at these levels  At the L4-5 and L5-S1 levels there is annular bulging with endplate and facet changes resulting in moderate bilateral foraminal narrowing  Chronic L5 spondylolysis with grade 1 anterior spondylolisthesis  Workstation performed: [id number]     MRI lumbar spine w wo contrast    Result Date: 9/15/2022  Impression: Postoperative change at L2-3 and L3-4 with prior laminectomies, new since prior examination  Improvement in the previously seen right-sided disc extrusion at L2-3  At L3-4 there is slight distortion of the right anterolateral aspect of the thecal sac due to a new small right paramedian disc extrusion  Stable postoperative change at L4-5 without canal stenosis  Stable mild foraminal narrowing  Stable chronic L5 spondylolysis and spondylolisthesis with mild bilateral foraminal narrowing  Workstation performed: [id number]       EKG, Pathology, and Other Studies: I have personally reviewed pertinent reports        VTE Pharmacologic Prophylaxis: Heparin    VTE Mechanical Prophylaxis: sequential compression device

## 2022-09-15 NOTE — ASSESSMENT & PLAN NOTE
Patient comes in with acute right buttock / back pain with radiation into RLE since Friday  · S/p LAMINECTOMY LUMBAR LAMINECTOMY, DISCECTOMY, FORAMINOTOMY, L3-4, RIGHT (12/17/2018, Dr Eric Ordoñez)  · S/p LUMBAR LAMINOTOMY/FORAMINOTOMY/FACETECTOMY/DISCECTOMY L2-3, RIGHT (11/12/2019, Dr Eric Ordoñez)  · Known bilateral foot drop; no additional red flag signs noted    Imaging:  · MRI lumbar spine 9/14/2022: post operative change at L2-3 and L3-4  Improvement in previously seen right sided disc extrusion  Slight distorion of the right anterolateral aspect of the thecal sac without severe foraminal stenosis  Stable post op change at L4-5 without canal or significant foraminal stenosis  L5 spondylolisthesis with bilateral narrowing chronic in nature     Plan:   Continue to monitor neuro exam   Imaging reviewed, no acute findings appreciated   Mri without significant disease  Recommend conservative measures with pain control on oral meds, PT and pain management referral at [place]   Medical management and pain control per primary team  o Should be on multimodal pain regimen   DVT ppx:  SCDs, heparin sq   Mobilize as tolerated with assistance, PT / OT evaluation   Will reschedule outpatient appointment as this is not required at this time with patient being evaluated in the hospital  Will schedule a clinical follow up after trial on conservative measures  Neurosurgery will sign off at this time  Please call with questions or concerns

## 2022-09-15 NOTE — TELEPHONE ENCOUNTER
09/16/2022-PT Nicole 25 12/23/2022 SNPX W/ALEXANDER    9/15/2022-Anuel Carver PA-C   Patient schedule with Paty Hassan for 9/27 I believe   Can we reschedule his appointment to 3 months as a SNPX with Alexander       09/15/2022-PT Nicole 25 09/27/2022 APT (NO IMAGING)

## 2022-09-15 NOTE — PLAN OF CARE
Problem: PAIN - ADULT  Goal: Verbalizes/displays adequate comfort level or baseline comfort level  Description: Interventions:  - Encourage patient to monitor pain and request assistance  - Assess pain using appropriate pain scale  - Administer analgesics based on type and severity of pain and evaluate response  - Implement non-pharmacological measures as appropriate and evaluate response  - Consider cultural and social influences on pain and pain management  - Notify physician/advanced practitioner if interventions unsuccessful or patient reports new pain  Outcome: Progressing     Problem: INFECTION - ADULT  Goal: Absence or prevention of progression during hospitalization  Description: INTERVENTIONS:  - Assess and monitor for signs and symptoms of infection  - Monitor lab/diagnostic results  - Monitor all insertion sites, i e  indwelling lines, tubes, and drains  - Monitor endotracheal if appropriate and nasal secretions for changes in amount and color  - [place] appropriate cooling/warming therapies per order  - Administer medications as ordered  - Instruct and encourage patient and family to use good hand hygiene technique  - Identify and instruct in appropriate isolation precautions for identified infection/condition  Outcome: Progressing  Goal: Absence of fever/infection during neutropenic period  Description: INTERVENTIONS:  - Monitor WBC    Outcome: Progressing

## 2022-09-15 NOTE — PHYSICAL THERAPY NOTE
Physical Therapy Evaluation     Patient's Name: Velma Fierro    Admitting Diagnosis  Back pain [M54 9]  Lower back pain [M54 50]    Problem List  Patient Active Problem List   Diagnosis    Essential hypertension    BPH    History of bladder cancer    Right footdrop due to L3-L4 disc protrusion     Alva syndrome    Right foot drop    Anxiety    Primary insomnia    Left foot drop    Lumbar radiculopathy    Allergic rhinitis    Eustachian tube dysfunction    Chronic prostatitis    Hyperlipidemia    Erectile dysfunction    Arthritis of carpometacarpal (CMC) joint of both thumbs    Acute right-sided low back pain without sciatica    Skin lesion    Acute right-sided low back pain       Past Medical History  Past Medical History:   Diagnosis Date    Cancer ([facility] )     Cancer ([facility] ) 11/04/2019    Bladder 3-4 yrs ago    Chronic pain disorder     Dry skin dermatitis     Dyspepsia     last assessed 09/27/17    Hypertension        Past Surgical History  Past Surgical History:   Procedure Laterality Date    BACK SURGERY Left     left with foot drop after surgery    BLADDER SURGERY      COLONOSCOPY  2012    Sanket John results not known    COLONOSCOPY  05/2020    1 polyp hyperplastic/internal hemorrhoids    EGD  10/15/2015    Nonbleeding erosive gastropathy-biopsy normal   Antral erosions biopsy negative for H  pylori    EGD  09/08/2020    Moderately severe esophagitis otherwise normal   Biopsy esophagus showed acute Candida esophagitis negative for dysplasia or carcinoma negative for herpes simplex virus is negative for cytomegaly a virus  Biopsy stomach negative for H  pylori    EGD  05/2020    One millimeter gastric ulcer-biopsy negative for H  pylori    Erosive gastritis    EGD  09/08/2020    Gastric ulcer resolve the entire stomach normal   Moderate to severe esophagitis-biopsy positive for Candida    POSTERIOR LAMINECTOMY THORACIC AND LUMBAR SPINE Right 12/17/2018    Procedure: LAMINECTOMY LUMBAR LAMINECTOMY, DISCECTOMY, FORAMINOTOMY, L3-4, RIGHT;  Surgeon: Yassine English MD;  [facility]: [facility];  Service: Neurosurgery    MD LAMNOTMY INCL W/DCMPRSN NRV ROOT 1 INTRSPC LUMBR Right 11/12/2019    Procedure: LUMBAR LAMINOTOMY/FORAMINOTOMY/FACETECTOMY/DISCECTOMY L2-3, RIGHT;  Surgeon: Yassine English MD;  Location: [facility];  Service: Neurosurgery        09/15/22 1130   PT Last Visit   PT Visit Date 09/15/22   Note Type   Note type Evaluation   Pain Assessment   Pain Assessment Tool 0-10   Pain Score 6   Pain Location/Orientation Orientation: Right;Location: Back; Location: Leg   Hospital Pain Intervention(s) Repositioned; Ambulation/increased activity   Restrictions/Precautions   Weight Bearing Precautions Per Order No   Braces or Orthoses Other (Comment)  (Pt reports he normally wears braces (was not sure if they are AFOs) for chronic b/l foot drop  Pt did not have them present for session )   Other Precautions Telemetry; Fall Risk;Pain   Home Living   Type of [address] [place] [address] Two level; Laundry in basement;Bed/bath upstairs  (8 [address], FF upstairs and to basement)   Bathroom Shower/Tub Tub/shower unit   Bathroom Toilet Standard   Bathroom Equipment   (Pt denies)   Home Equipment   (Pt denies)   Prior Function   Level of Loving Independent with ADLs and functional mobility   Lives With Carlson-Jim Help From Family   ADL Assistance Independent   IADLs Independent   Falls in the last 6 months 0  (? as pt initially stated 0 but later made comments about having fallen in the past)   Vocational Full time employment   General   Family/Caregiver Present No   Cognition   Overall Cognitive Status WFL   Arousal/Participation Alert   Attention Within functional limits   Orientation Level Oriented X4   Memory Within functional limits   Following Commands Follows all commands and directions without difficulty   Comments Pt w/ decreased safety awareness and insight  Able to follow commands and hold conversation with ease     Subjective Subjective Pt pleasant and agreeable to participate in therapy session  RLE Assessment   RLE Assessment X   Strength RLE   R Hip Flexion 4-/5   R Knee Flexion 4/5   R Knee Extension 4+/5   R Ankle Dorsiflexion 0/5   R Ankle Plantar Flexion 5/5   LLE Assessment   LLE Assessment X   Strength LLE   L Hip Flexion 5/5   L Knee Flexion 5/5   L Knee Extension 4+/5   L Ankle Dorsiflexion 0/5   L Ankle Plantar Flexion 5/5   Light Touch   RLE Light Touch Impaired   RLE Light Touch Comments Pt reports numbness to the side of R calf   LLE Light Touch Grossly intact   Bed Mobility   Supine to Sit 6  Modified independent   Additional items HOB elevated;Verbal cues   Sit to Supine 6  Modified independent   Additional items HOB elevated   Additional Comments Pt supine in bed upon PT consult  Pt left supine in bed with all needs within reach  RN informed that pt is unsteady on his feet and should not be ambulating on his own due to risk of falls  Transfers   Sit to Stand 5  Supervision   Additional items Increased time required   Stand to Sit 5  Supervision   Additional Comments Pt transfers w/o AD  Ambulation/Elevation   Gait pattern Steppage; Ataxia; Step through pattern;Decreased foot clearance  (Chronic b/l foot drop w/o braces leading to high steppage gait for foot clearance and unsteadiness overall )   Gait Assistance 5  Supervision   Additional items Verbal cues; Tactile cues   Assistive Device None; Other (Comment)  (Recommend use of RW )   Distance [de-identified] ft, 60 ft  (seated rest break in between)   Ambulation/Elevation Additional Comments Pt ambulates w/ RW  Pt required ModAx1 for LOB/assistance to sit in hallway chair due to significant increase in RLE and back pain with ambulation  Pt was able to ambulate back to room following  Pt demonstrates decreased foot clearance due to foot drop and not having the proper bracing that he normally wears leading to high steppage/marching gait pattern   Pt expresses no concerns about using the stairs in his home  Balance   Static Sitting Good   Dynamic Sitting Fair +   Static Standing Fair   Dynamic Standing Fair -   Ambulatory Poor +   Endurance Deficit   Endurance Deficit Yes   Endurance Deficit Description Pt significantly limited by increase in back pain with ambulation  Activity Tolerance   Activity Tolerance Patient limited by pain   Medical Staff Made Aware OT Shi   Nurse Made Aware RN cleared pt to be seen by PT  Assessment   Prognosis Good   Assessment Pt is a 60 y o  male who presents for high complexity PT evaluation at [place] [address] with active PT eval and treat orders  Pt was admitted for acute R sided LBP and RLE radiation on 09/15/2022  L/S CT reveals "prior right laminectomy at the L2-3 and L3-4 levels and left laminectomy at the L4-5 level  Persistent annular bulging and degenerative change as described above with mild to moderate canal stenosis and mild foraminal narrowing at these levels  At the L4-5 and L5-S1 levels there is annular bulging with endplate and facet changes resulting in moderate bilateral foraminal narrowing  Chronic L5 spondylolysis with grade 1 anterior spondylolisthesis " Pt is awaiting MRI results but neurosurgery anticipates conservative management at this time  Pt  has a past medical history of Cancer ([facility] ), Cancer ([facility] ) (11/04/2019), Chronic pain disorder, Dry skin dermatitis, Dyspepsia, and Hypertension  Pt resides with his spouse in a 2  with 8 [address] and bed/bath upstairs  Pt ambulates w/o AD at baseline but wears braces (was unsure of type) for b/l foot drop  Pt demonstrates modified independence for bed mobility and requires supervision for functional transfers, and ambulation at this time  Based on pt current functional status, PT to discharge pt from caseload at this time with recommended discharge to home with OPPT for further improvement of gait pattern  Also recommend use of the RW for ambulation at this time   The patient's AM-PAC Basic Mobility Inpatient Short Form Raw Score is 20  A Raw score of greater than 16 suggests the patient may benefit from discharge to home  Please also refer to the recommendation of the Physical Therapist for safe discharge planning  Barriers to Discharge None   Goals   Patient Goals to know what the MRI says   Plan   PT Frequency Other (Comment)  (D/C PT)   Recommendation   PT Discharge Recommendation Home with outpatient rehabilitation   Equipment Recommended Walker; Other (Comment)  (Also encourage wear of AFOs for b/l foot drop)   AM-PAC Basic Mobility Inpatient   Turning in Bed Without Bedrails 4   Lying on Back to Sitting on Edge of Flat Bed 4   Moving Bed to Chair 3   Standing Up From Chair 3   Walk in Room 3   Climb 3-5 Stairs 3   Basic Mobility Inpatient Raw Score 20   Basic Mobility Standardized Score 43 99   Highest Level Of Mobility   JH-HLM Goal 6: Walk 10 steps or more   JH-HLM Achieved 7: Walk 25 feet or more   Modified David Scale   Modified [place] Scale 3   End of Consult   Patient Position at End of Consult Supine; All needs within reach   End of Consult Comments RN made aware of pt unsteady gait and that he should not be ambulating alone in the room             Tadeo Magaña , SPT

## 2022-09-16 DIAGNOSIS — N40.1 BENIGN NODULAR PROSTATIC HYPERPLASIA WITH LOWER URINARY TRACT SYMPTOMS: ICD-10-CM

## 2022-09-16 PROBLEM — A41.9 SEPSIS (HCC): Status: ACTIVE | Noted: 2022-09-16

## 2022-09-16 LAB
BACTERIA UR QL AUTO: ABNORMAL /HPF
BILIRUB UR QL STRIP: NEGATIVE
CLARITY UR: CLEAR
COLOR UR: YELLOW
DME PARACHUTE DELIVERY DATE REQUESTED: NORMAL
DME PARACHUTE ITEM DESCRIPTION: NORMAL
DME PARACHUTE ORDER STATUS: NORMAL
DME PARACHUTE SUPPLIER NAME: NORMAL
DME PARACHUTE SUPPLIER PHONE: NORMAL
GLUCOSE UR STRIP-MCNC: NEGATIVE MG/DL
HGB UR QL STRIP.AUTO: ABNORMAL
KETONES UR STRIP-MCNC: NEGATIVE MG/DL
LACTATE SERPL-SCNC: 1.9 MMOL/L (ref 0.5–2)
LEUKOCYTE ESTERASE UR QL STRIP: ABNORMAL
MUCOUS THREADS UR QL AUTO: ABNORMAL
NITRITE UR QL STRIP: NEGATIVE
NON-SQ EPI CELLS URNS QL MICRO: ABNORMAL /HPF
PH UR STRIP.AUTO: 6 [PH]
PROCALCITONIN SERPL-MCNC: 0.57 NG/ML
PROT UR STRIP-MCNC: ABNORMAL MG/DL
RBC #/AREA URNS AUTO: ABNORMAL /HPF
SP GR UR STRIP.AUTO: 1.04 (ref 1–1.03)
UROBILINOGEN UR STRIP-ACNC: <2 MG/DL
WBC #/AREA URNS AUTO: ABNORMAL /HPF

## 2022-09-16 PROCEDURE — 81001 URINALYSIS AUTO W/SCOPE: CPT | Performed by: INTERNAL MEDICINE

## 2022-09-16 PROCEDURE — 87077 CULTURE AEROBIC IDENTIFY: CPT | Performed by: INTERNAL MEDICINE

## 2022-09-16 PROCEDURE — 83605 ASSAY OF LACTIC ACID: CPT | Performed by: INTERNAL MEDICINE

## 2022-09-16 PROCEDURE — 87154 CUL TYP ID BLD PTHGN 6+ TRGT: CPT | Performed by: INTERNAL MEDICINE

## 2022-09-16 PROCEDURE — 84145 PROCALCITONIN (PCT): CPT | Performed by: INTERNAL MEDICINE

## 2022-09-16 PROCEDURE — 99233 SBSQ HOSP IP/OBS HIGH 50: CPT | Performed by: INTERNAL MEDICINE

## 2022-09-16 PROCEDURE — 87040 BLOOD CULTURE FOR BACTERIA: CPT | Performed by: INTERNAL MEDICINE

## 2022-09-16 PROCEDURE — 87186 SC STD MICRODIL/AGAR DIL: CPT | Performed by: INTERNAL MEDICINE

## 2022-09-16 PROCEDURE — 87086 URINE CULTURE/COLONY COUNT: CPT | Performed by: INTERNAL MEDICINE

## 2022-09-16 RX ORDER — AMLODIPINE BESYLATE 5 MG/1
10 TABLET ORAL DAILY
Status: DISCONTINUED | OUTPATIENT
Start: 2022-09-17 | End: 2022-09-20 | Stop reason: HOSPADM

## 2022-09-16 RX ORDER — CIPROFLOXACIN 2 MG/ML
400 INJECTION, SOLUTION INTRAVENOUS EVERY 12 HOURS
Status: DISCONTINUED | OUTPATIENT
Start: 2022-09-16 | End: 2022-09-17

## 2022-09-16 RX ADMIN — OXYCODONE HYDROCHLORIDE 5 MG: 5 TABLET ORAL at 17:57

## 2022-09-16 RX ADMIN — TAMSULOSIN HYDROCHLORIDE 0.4 MG: 0.4 CAPSULE ORAL at 17:57

## 2022-09-16 RX ADMIN — AMLODIPINE BESYLATE 2.5 MG: 2.5 TABLET ORAL at 09:29

## 2022-09-16 RX ADMIN — ACETAMINOPHEN 975 MG: 325 TABLET, FILM COATED ORAL at 21:13

## 2022-09-16 RX ADMIN — ACETAMINOPHEN 975 MG: 325 TABLET, FILM COATED ORAL at 06:46

## 2022-09-16 RX ADMIN — CIPROFLOXACIN 400 MG: 2 INJECTION, SOLUTION INTRAVENOUS at 17:47

## 2022-09-16 RX ADMIN — LIDOCAINE 5% 1 PATCH: 700 PATCH TOPICAL at 09:29

## 2022-09-16 RX ADMIN — ACETAMINOPHEN 975 MG: 325 TABLET, FILM COATED ORAL at 15:51

## 2022-09-16 NOTE — ASSESSMENT & PLAN NOTE
Likely secondary to urinary tract infection secondary to urologic procedure on 9/12  Patient stated he was prescribed prophylactic antibiotics postprocedure but did not take them  Noted fevers greater than 102 today  Blood cultures ordered, will initiate IV ciprofloxacin rather than broad-spectrum given likely source  Patient is hypertensive with normal renal function therefore will not initiate sepsis IV fluid bolus  UA with culture repeated, 9/14 culture without growth  Tylenol for fevers

## 2022-09-16 NOTE — ASSESSMENT & PLAN NOTE
- presents with 5 day history of worsening low right back pain with radiation down the posterior lateral lower right extremity  - denies any saddle anesthesia or bladder/bowel dysfunction suggestive of cauda equina  - CT scan in the ER showing postsurgical changes and L4-L5 and L5-S1 bilateral foraminal narrowing    - patient with history of 3 lumbar back surgeries in the past including most recently a lumbar decompression in 2019    - multimodal pain control with IV Decadron, Tylenol ATC, Toradol p r n , Flexeril p r n , Norco p r n   - MRA without significant new findings continue conservative management including pain control, physical therapy  - continue serial neurologic exams  - seen by Neurosurgery, appreciated

## 2022-09-16 NOTE — PROGRESS NOTES
[facility]  Progress Note - Sidney Caraballo 1960, 60 y o  male MRN: [id number]  Unit/Bed#: [facility] [address] Encounter: [id number]  Primary Care Provider: ABNER Bass   Date and time admitted to hospital: 9/14/2022  9:01 AM    Sepsis [facility])  Assessment & Plan  Likely secondary to urinary tract infection secondary to urologic procedure on 9/12  Patient stated he was prescribed prophylactic antibiotics postprocedure but did not take them  Noted fevers greater than 102 today  Blood cultures ordered, will initiate IV ciprofloxacin rather than broad-spectrum given likely source  Patient is hypertensive with normal renal function therefore will not initiate sepsis IV fluid bolus  UA with culture  Tylenol for fevers    BPH  Assessment & Plan  - continue home Flomax    Essential hypertension  Assessment & Plan  Elevated suspect secondary to the pain component  Continue home amlodipine, dose increased    * Acute right-sided low back pain  Assessment & Plan  - presents with 5 day history of worsening low right back pain with radiation down the posterior lateral lower right extremity  - denies any saddle anesthesia or bladder/bowel dysfunction suggestive of cauda equina  - CT scan in the ER showing postsurgical changes and L4-L5 and L5-S1 bilateral foraminal narrowing    - patient with history of 3 lumbar back surgeries in the past including most recently a lumbar decompression in 2019    - multimodal pain control with IV Decadron, Tylenol ATC, Toradol p r n , Flexeril p r n , Norco p r n   - MRA without significant new findings continue conservative management including pain control, physical therapy  - continue serial neurologic exams  - seen by Neurosurgery, appreciated          VTE Pharmacologic Prophylaxis: VTE Score: 4 Moderate Risk (Score 3-4) - Pharmacological DVT Prophylaxis Ordered: heparin      Patient Centered Rounds: I performed bedside rounds with nursing staff today   Discussions with Specialists or Other Care Team Provider: CM    Education and Discussions with Family / Patient: Patient declined call to   Time Spent for Care: 45 minutes  More than 50% of total time spent on counseling and coordination of care as described above  Current Length of Stay: 2 day(s)  Current Patient Status: Inpatient   Certification Statement: The patient will continue to require additional inpatient hospital stay due to New sepsis diagnosis, physical therapy, pain control  Discharge Plan: Anticipate discharge in 48-72 hrs to home  Code Status: Level 1 - Full Code    Subjective:   Patient seen and examined at bedside  No acute events overnight  Patient reports improvement in back pain and improvement in ability to ambulate but still with significant pain on movement  Later in afternoon patient disclosed a nurse that he had urologic procedure this past Monday and was prescribed prophylactic ciprofloxacin but for various reasons did not take this medication  He was found have a fever greater than 102  Objective:     Vitals:   Temp (24hrs), Av 1 °F (37 8 °C), Min:98 1 °F (36 7 °C), Max:102 1 °F (38 9 °C)    Temp:  [98 1 °F (36 7 °C)-102 1 °F (38 9 °C)] 101 8 °F (38 8 °C)  HR:  [65-92] 92  Resp:  [15-20] 20  BP: (148-184)/(75-87) 184/87  SpO2:  [95 %-98 %] 98 %  There is no height or weight on file to calculate BMI  Input and Output Summary (last 24 hours):   No intake or output data in the 24 hours ending 22 1813    Physical Exam:   Physical Exam  Constitutional:       General: He is not in acute distress  Appearance: Normal appearance  He is not ill-appearing  Cardiovascular:      Rate and Rhythm: Normal rate and regular rhythm  Pulmonary:      Effort: Pulmonary effort is normal  No respiratory distress  Abdominal:      General: Abdomen is flat  Palpations: Abdomen is soft     Musculoskeletal:      Comments: Gait not tested secondary to pain   Neurological:      General: No focal deficit present  Mental Status: He is alert and oriented to person, place, and time  Sensory: No sensory deficit  Motor: No weakness  Additional Data:     Labs:  Results from last 7 days   Lab Units 09/15/22  0524 09/14/22  1151   WBC Thousand/uL 25 76* 22 58*   HEMOGLOBIN g/dL 13 9 14 3   HEMATOCRIT % 42 9 42 3   PLATELETS Thousands/uL 207 213   NEUTROS PCT %  --  87*   LYMPHS PCT %  --  6*   MONOS PCT %  --  6   EOS PCT %  --  0     Results from last 7 days   Lab Units 09/15/22  0524 09/14/22  1151   SODIUM mmol/L 136 137   POTASSIUM mmol/L 4 4 3 9   CHLORIDE mmol/L 104 105   CO2 mmol/L 26 28   BUN mg/dL 32* 29*   CREATININE mg/dL 1 00 1 24   ANION GAP mmol/L 6 4   CALCIUM mg/dL 8 7 8 6   ALBUMIN g/dL  --  3 7   TOTAL BILIRUBIN mg/dL  --  1 55*   ALK PHOS U/L  --  54   ALT U/L  --  26   AST U/L  --  11   GLUCOSE RANDOM mg/dL 125 130     Results from last 7 days   Lab Units 09/15/22  0524   INR  1 08                   Lines/Drains:  Invasive Devices  Report    Peripheral Intravenous Line  Duration           Peripheral IV 09/14/22 Left;Proximal;Ventral (anterior) Forearm 2 days                      Imaging: No pertinent imaging reviewed      Recent Cultures (last 7 days):   Results from last 7 days   Lab Units 09/14/22  1058 09/12/22  1421   URINE CULTURE  No Growth <1000 cfu/mL No Growth <1000 cfu/mL       Last 24 Hours Medication List:   Current Facility-Administered Medications   Medication Dose Route Frequency Provider Last Rate    acetaminophen  975 mg Oral [facility] Delvin Morales DO      [START ON 9/17/2022] amLODIPine  10 mg Oral Daily Starla Freeman MD      ciprofloxacin  400 mg Intravenous Q12H Starla Freeman  mg (09/16/22 1479)    heparin (porcine)  5,000 Units Subcutaneous [facility] Delvin Morales DO      lidocaine  1 patch Topical Daily Delvin Morales DO      methocarbamol  500 mg Oral Q6H PRN Nicholette Inga Britt Owens,       ondansetron  4 mg Intravenous Q6H PRN Brissa Morales, DO      oxyCODONE  5 mg Oral Q4H PRN Brissa Morales, DO      tamsulosin  0 4 mg Oral Daily Nelsy Grimm, DO          Today, Patient Was Seen By: Amol Bradshaw MD    **Please Note: This note may have been constructed using a voice recognition system  **

## 2022-09-16 NOTE — CASE MANAGEMENT
Case Management Discharge Planning Note    Patient name Satnam Neri  Location [facility] [address] [address]37 MRN [id number]  : 1960 Date 2022       Current Admission Date: 2022  Current Admission Diagnosis:Acute right-sided low back pain   Patient Active Problem List    Diagnosis Date Noted    Acute right-sided low back pain 2022    Acute right-sided low back pain without sciatica 2022    Skin lesion 2022    Arthritis of carpometacarpal [place]ose[place]) joint of both thumbs 2022    Chronic prostatitis 2020    Hyperlipidemia 2020    Erectile dysfunction 2020    Allergic rhinitis 2020    Eustachian tube dysfunction 2020    Lumbar radiculopathy 10/28/2019    Anxiety 2019    Primary insomnia 2019    Left foot drop 2019    Right foot drop 2018    [place] syndrome 2017    Essential hypertension 2017    History of bladder cancer 2016    BPH 10/05/2015    Right footdrop due to L3-L4 disc protrusion  2015      LOS (days): 2  Geometric Mean LOS (GMLOS) (days): 2 90  Days to GMLOS:0 8     OBJECTIVE:  Risk of Unplanned Readmission Score: 7 46         Current admission status: Inpatient   Preferred Pharmacy:   [place] [address]  Phone: [phone] Fax: [phone]    Primary Care Provider: ABNER Thompson    Primary Insurance: BLUE CROSS  Secondary Insurance:     DISCHARGE DETAILS:     Additional Comments: Delivered rolled walker to patient room  Patient did not have any questions or concerns

## 2022-09-16 NOTE — CASE MANAGEMENT
Case Management Assessment & Discharge Planning Note    Patient name Leela Stafford  [facility] MRN [id number]  : 1960 Date 2022       Current Admission Date: 2022  Current Admission Diagnosis:Acute right-sided low back pain   Patient Active Problem List    Diagnosis Date Noted    Acute right-sided low back pain 2022    Acute right-sided low back pain without sciatica 2022    Skin lesion 2022    Arthritis of carpometacarpal [place]) joint of both thumbs 2022    Chronic prostatitis 2020    Hyperlipidemia 2020    Erectile dysfunction 2020    Allergic rhinitis 2020    Eustachian tube dysfunction 2020    Lumbar radiculopathy 10/28/2019    Anxiety 2019    Primary insomnia 2019    Left foot drop 2019    Right foot drop 2018    [place] syndrome 2017    Essential hypertension 2017    History of bladder cancer 2016    BPH 10/05/2015    Right footdrop due to L3-L4 disc protrusion  2015      LOS (days): 2  Geometric Mean LOS (GMLOS) (days): 2 90  Days to GMLOS:0 9     OBJECTIVE:    Risk of Unplanned Readmission Score: 7 46         Current admission status: Inpatient       Preferred Pharmacy:   [place] [address]  Phone: [phone] Fax: [phone]    Primary Care Provider: ABNER Adame    Primary Insurance: BLUE CROSS  Secondary Insurance:     ASSESSMENT:  [address] Proxies    There are no active Health Care Proxies on file                   Readmission Root Cause  30 Day Readmission: No    Patient Information  Admitted from[de-identified] Home  Mental Status: Alert  During Assessment patient was accompanied by: Not accompanied during assessment  Assessment information provided by[de-identified] Patient  Primary Caregiver: Self  Support Systems: Spouse/significant other (girlfriend)  [place] of Residence: [place]  What city do you live in?: [facility] entry access options   Select all that apply : Stairs  Number of steps to enter home : 8  Type of Current Residence: 2 story home  Upon entering residence, is there a bedroom on the main floor (no further steps)?: No  A bedroom is located on the following floor levels of residence (select all that apply):: 2nd Floor  Upon entering residence, is there a bathroom on the main floor (no further steps)?: No  Indicate which floors of current residence have a bathroom (select all the apply):: 2nd Floor  Number of steps to 2nd floor from main floor: One Flight  In the last 12 months, was there a time when you were not able to pay the mortgage or rent on time?: No  In the last 12 months, was there a time when you did not have a steady place to sleep or slept in a shelter (including now)?: No  Homeless/housing insecurity resource given?: N/A  Living Arrangements: Lives w/ Spouse/significant other  Is patient a ?: No    Activities of Daily Living Prior to Admission  Functional Status: Independent  Completes ADLs independently?: Yes  Ambulates independently?: Yes  Does patient use assisted devices?: No  Does patient currently own DME?: No  Does patient have a history of Outpatient Therapy (PT/OT)?: No  Does the patient have a history of Short-Term Rehab?: No  Does patient have a history of HHC?: No  Does patient currently have [address]?: No         Patient Information Continued  Income Source: Self-employed  Within the past 12 months, you worried that your food would run out before you got the money to buy more : Never true  Within the past 12 months, the food you bought just didn't last and you didn't have money to get more : Never true  Food insecurity resource given?: N/A  Does patient receive dialysis treatments?: No  Does patient have a history of substance abuse?: No  Does patient have a history of Mental Health Diagnosis?: No         Means of Transportation  Means of Transport to Appts[de-identified] Drives Self  In the past 12 months, has lack of transportation kept you from medical appointments or from getting medications?: No  In the past 12 months, has lack of transportation kept you from meetings, work, or from getting things needed for daily living?: No  Was application for public transport provided?: N/A        DISCHARGE DETAILS:    Discharge planning discussed with[de-identified] patient  Freedom of Choice: Yes     CM contacted family/caregiver?: No- see comments (patient alert & oriented)  Were Treatment Team discharge recommendations reviewed with patient/caregiver?: Yes  Did patient/caregiver verbalize understanding of patient care needs?: Yes  Were patient/caregiver advised of the risks associated with not following Treatment Team discharge recommendations?: Yes    Contacts  Patient Contacts: Hilario Arango, brother  Relationship to Patient[de-identified] Family  Contact Method: Phone  Phone Number: [phone]  Reason/Outcome: Emergency [address]         Is the patient interested in [facility] at discharge?: No    DME Referral Provided  Referral made for DME?: Yes  DME referral completed for the following items[de-identified] Lianna Camilo  DME Supplier Name[de-identified] [employer]    Other Referral/Resources/Interventions Provided:  Interventions: Outpatient PT         Treatment Team Recommendation: Home  Discharge Destination Plan[de-identified] Home  Transport at Discharge : Family             Patient/caregiver received discharge checklist   Content reviewed  Patient/caregiver encouraged to participate in discharge plan of care prior to discharge home  CM reviewed d/c planning process including the following: identifying help at home, patient preference for d/c planning needs, Discharge Lounge, [facility] Meds to Bed program, availability of treatment team to discuss questions or concerns patient and/or family may have regarding understanding medications and recognizing signs and symptoms once discharged    CM also encouraged patient to follow up with all recommended appointments after discharge  Patient advised of importance for patient and family to participate in managing patients medical well being  Additional Comments: Patient independent at baseline, self-employed, drives  Provided patient with list of outpatient PT providers, ordered walker from [email or web address]  Patient's girlfriend or family will be able to provide d/c transportation  CM to follow

## 2022-09-17 PROBLEM — R78.81 BACTEREMIA DUE TO GRAM-NEGATIVE BACTERIA: Status: ACTIVE | Noted: 2022-09-17

## 2022-09-17 LAB
ANION GAP SERPL CALCULATED.3IONS-SCNC: 1 MMOL/L (ref 4–13)
BASOPHILS # BLD AUTO: 0.03 THOUSANDS/ΜL (ref 0–0.1)
BASOPHILS NFR BLD AUTO: 0 % (ref 0–1)
BUN SERPL-MCNC: 24 MG/DL (ref 5–25)
CALCIUM SERPL-MCNC: 8.3 MG/DL (ref 8.3–10.1)
CHLORIDE SERPL-SCNC: 105 MMOL/L (ref 96–108)
CO2 SERPL-SCNC: 29 MMOL/L (ref 21–32)
CREAT SERPL-MCNC: 1.05 MG/DL (ref 0.6–1.3)
EOSINOPHIL # BLD AUTO: 0.04 THOUSAND/ΜL (ref 0–0.61)
EOSINOPHIL NFR BLD AUTO: 1 % (ref 0–6)
ERYTHROCYTE [DISTWIDTH] IN BLOOD BY AUTOMATED COUNT: 14.1 % (ref 11.6–15.1)
GFR SERPL CREATININE-BSD FRML MDRD: 75 ML/MIN/1.73SQ M
GLUCOSE SERPL-MCNC: 90 MG/DL (ref 65–140)
GRAM STN SPEC: ABNORMAL
HCT VFR BLD AUTO: 42.4 % (ref 36.5–49.3)
HGB BLD-MCNC: 13.9 G/DL (ref 12–17)
IMM GRANULOCYTES # BLD AUTO: 0.05 THOUSAND/UL (ref 0–0.2)
IMM GRANULOCYTES NFR BLD AUTO: 1 % (ref 0–2)
LYMPHOCYTES # BLD AUTO: 0.85 THOUSANDS/ΜL (ref 0.6–4.47)
LYMPHOCYTES NFR BLD AUTO: 12 % (ref 14–44)
MCH RBC QN AUTO: 29.8 PG (ref 26.8–34.3)
MCHC RBC AUTO-ENTMCNC: 32.8 G/DL (ref 31.4–37.4)
MCV RBC AUTO: 91 FL (ref 82–98)
MONOCYTES # BLD AUTO: 0.46 THOUSAND/ΜL (ref 0.17–1.22)
MONOCYTES NFR BLD AUTO: 6 % (ref 4–12)
NEUTROPHILS # BLD AUTO: 5.79 THOUSANDS/ΜL (ref 1.85–7.62)
NEUTS SEG NFR BLD AUTO: 80 % (ref 43–75)
NRBC BLD AUTO-RTO: 0 /100 WBCS
P AERUGINOSA DNA BLD POS NAA+NON-PROBE: DETECTED
PLATELET # BLD AUTO: 191 THOUSANDS/UL (ref 149–390)
PMV BLD AUTO: 9.7 FL (ref 8.9–12.7)
POTASSIUM SERPL-SCNC: 3.9 MMOL/L (ref 3.5–5.3)
PROCALCITONIN SERPL-MCNC: 1.73 NG/ML
RBC # BLD AUTO: 4.66 MILLION/UL (ref 3.88–5.62)
SODIUM SERPL-SCNC: 135 MMOL/L (ref 135–147)
WBC # BLD AUTO: 7.22 THOUSAND/UL (ref 4.31–10.16)

## 2022-09-17 PROCEDURE — 80048 BASIC METABOLIC PNL TOTAL CA: CPT | Performed by: INTERNAL MEDICINE

## 2022-09-17 PROCEDURE — 99233 SBSQ HOSP IP/OBS HIGH 50: CPT | Performed by: INTERNAL MEDICINE

## 2022-09-17 PROCEDURE — 85025 COMPLETE CBC W/AUTO DIFF WBC: CPT | Performed by: INTERNAL MEDICINE

## 2022-09-17 PROCEDURE — 84145 PROCALCITONIN (PCT): CPT | Performed by: INTERNAL MEDICINE

## 2022-09-17 RX ADMIN — CIPROFLOXACIN 400 MG: 2 INJECTION, SOLUTION INTRAVENOUS at 04:54

## 2022-09-17 RX ADMIN — TAMSULOSIN HYDROCHLORIDE 0.4 MG: 0.4 CAPSULE ORAL at 18:13

## 2022-09-17 RX ADMIN — OXYCODONE HYDROCHLORIDE 5 MG: 5 TABLET ORAL at 20:29

## 2022-09-17 RX ADMIN — ACETAMINOPHEN 975 MG: 325 TABLET, FILM COATED ORAL at 21:29

## 2022-09-17 RX ADMIN — CEFEPIME 2000 MG: 2 INJECTION, POWDER, FOR SOLUTION INTRAVENOUS at 18:13

## 2022-09-17 RX ADMIN — ACETAMINOPHEN 975 MG: 325 TABLET, FILM COATED ORAL at 05:45

## 2022-09-17 RX ADMIN — AMLODIPINE BESYLATE 10 MG: 5 TABLET ORAL at 11:00

## 2022-09-17 RX ADMIN — LIDOCAINE 5% 1 PATCH: 700 PATCH TOPICAL at 11:00

## 2022-09-17 RX ADMIN — OXYCODONE HYDROCHLORIDE 5 MG: 5 TABLET ORAL at 11:00

## 2022-09-17 RX ADMIN — OXYCODONE HYDROCHLORIDE 5 MG: 5 TABLET ORAL at 15:06

## 2022-09-17 RX ADMIN — ACETAMINOPHEN 975 MG: 325 TABLET, FILM COATED ORAL at 13:48

## 2022-09-17 RX ADMIN — METHOCARBAMOL 500 MG: 500 TABLET, FILM COATED ORAL at 11:00

## 2022-09-17 RX ADMIN — METHOCARBAMOL 500 MG: 500 TABLET, FILM COATED ORAL at 18:13

## 2022-09-17 NOTE — ASSESSMENT & PLAN NOTE
Likely secondary to urinary tract infection secondary to urologic procedure on 9/12  Patient stated he was prescribed prophylactic antibiotics postprocedure but did not take them  Noted fevers >102 the significant leukocytosis 22,000 on 9/16  Blood found to grow Gram-negative rods will transition to cefepime, fluoroquinolone effective given improvement in leukocytosis and resolution of fevers while on ciprofloxacin but will broaden pending bacterial identification given recent urologic procedure  Tylenol for fevers

## 2022-09-17 NOTE — ASSESSMENT & PLAN NOTE
Secondary to UTI to recent urologic procedure  Blood cultures drawn yesterday found to grow Gram-negative rods  Transition to cefepime from ciprofloxacin pending bacterial identification and sensitivity

## 2022-09-17 NOTE — ASSESSMENT & PLAN NOTE
Elevated suspect secondary to the pain component  Continue home amlodipine, dose increased, now well controlled

## 2022-09-17 NOTE — PROGRESS NOTES
[address] [facility]  Progress Note - Jesus Olivares 1960, 60 y o  male MRN: [id number]  Unit/Bed#: [address] [place] [address] [address]01 Encounter: [id number]  Primary Care Provider: ABNER Heard   Date and time admitted to hospital: 9/14/2022  9:01 AM    Bacteremia due to Gram-negative bacteria  Assessment & Plan  Secondary to UTI to recent urologic procedure  Blood cultures drawn yesterday found to grow Gram-negative rods  Transition to cefepime from ciprofloxacin pending bacterial identification and sensitivity    Sepsis ([facility] [address] )  Assessment & Plan  Likely secondary to urinary tract infection secondary to urologic procedure on 9/12  Patient stated he was prescribed prophylactic antibiotics postprocedure but did not take them  Noted fevers >102 the significant leukocytosis 22,000 on 9/16  Blood found to grow Gram-negative rods will transition to cefepime, fluoroquinolone effective given improvement in leukocytosis and resolution of fevers while on ciprofloxacin but will broaden pending bacterial identification given recent urologic procedure  Tylenol for fevers    BPH  Assessment & Plan  - continue home Flomax    Essential hypertension  Assessment & Plan  Elevated suspect secondary to the pain component  Continue home amlodipine, dose increased, now well controlled    * Acute right-sided low back pain  Assessment & Plan  - presents with 5 day history of worsening low right back pain with radiation down the posterior lateral lower right extremity  - denies any saddle anesthesia or bladder/bowel dysfunction suggestive of cauda equina  - CT scan in the ER showing postsurgical changes and L4-L5 and L5-S1 bilateral foraminal narrowing    - patient with history of 3 lumbar back surgeries in the past including most recently a lumbar decompression in 2019    - multimodal pain control with IV Decadron, Tylenol ATC, Toradol p r n , Flexeril p r n , Norco p r n   - MRA without significant new findings continue conservative management including pain control, physical therapy  - continue serial neurologic exams  - seen by Neurosurgery, appreciated          VTE Pharmacologic Prophylaxis: VTE Score: 4 Moderate Risk (Score 3-4) - Pharmacological DVT Prophylaxis Ordered: enoxaparin (Lovenox)  Patient Centered Rounds: I performed bedside rounds with nursing staff today  Discussions with Specialists or Other Care Team Provider: brenna    Education and Discussions with Family / Patient: Patient declined call to   Time Spent for Care: 45 minutes  More than 50% of total time spent on counseling and coordination of care as described above  Current Length of Stay: 3 day(s)  Current Patient Status: Inpatient   Certification Statement: The patient will continue to require additional inpatient hospital stay due to Newly diagnosed Gram-negative soto bacteremia  Discharge Plan: Anticipate discharge in 48-72 hrs to discharge location to be determined pending rehab evaluations  Code Status: Level 1 - Full Code    Subjective:   Patient seen and examined at bedside  No acute events overnight  Patient reported fevers with drenching sweats yesterday evening but that these broke overnight and today he feels well  Continued back pain worse with movement  Objective:     Vitals:   Temp (24hrs), Av 7 °F (37 6 °C), Min:98 2 °F (36 8 °C), Max:101 8 °F (38 8 °C)    Temp:  [98 2 °F (36 8 °C)-101 8 °F (38 8 °C)] 100 4 °F (38 °C)  HR:  [63-81] 81  Resp:  [16-20] 16  BP: (119-150)/(64-84) 149/80  SpO2:  [98 %] 98 %  There is no height or weight on file to calculate BMI  Input and Output Summary (last 24 hours): Intake/Output Summary (Last 24 hours) at 2022 1644  Last data filed at 2022 1350  Gross per 24 hour   Intake 480 ml   Output --   Net 480 ml       Physical Exam:   Physical Exam  Constitutional:       General: He is not in acute distress  Appearance: Normal appearance  He is ill-appearing   He is not diaphoretic  Cardiovascular:      Rate and Rhythm: Normal rate and regular rhythm  Pulmonary:      Effort: Pulmonary effort is normal  No respiratory distress  Abdominal:      General: Abdomen is flat  Palpations: Abdomen is soft  Musculoskeletal:      Comments: Posterior right hip tenderness Gait not tested secondary to pain   Neurological:      General: No focal deficit present  Mental Status: He is alert and oriented to person, place, and time  Sensory: No sensory deficit  Motor: No weakness  Additional Data:     Labs:  Results from last 7 days   Lab Units 09/17/22  0614   WBC Thousand/uL 7 22   HEMOGLOBIN g/dL 13 9   HEMATOCRIT % 42 4   PLATELETS Thousands/uL 191   NEUTROS PCT % 80*   LYMPHS PCT % 12*   MONOS PCT % 6   EOS PCT % 1     Results from last 7 days   Lab Units 09/17/22  0614 09/15/22  0524 09/14/22  1151   SODIUM mmol/L 135   < > 137   POTASSIUM mmol/L 3 9   < > 3 9   CHLORIDE mmol/L 105   < > 105   CO2 mmol/L 29   < > 28   BUN mg/dL 24   < > 29*   CREATININE mg/dL 1 05   < > 1 24   ANION GAP mmol/L 1*   < > 4   CALCIUM mg/dL 8 3   < > 8 6   ALBUMIN g/dL  --   --  3 7   TOTAL BILIRUBIN mg/dL  --   --  1 55*   ALK PHOS U/L  --   --  54   ALT U/L  --   --  26   AST U/L  --   --  11   GLUCOSE RANDOM mg/dL 90   < > 130    < > = values in this interval not displayed  Results from last 7 days   Lab Units 09/15/22  0524   INR  1 08             Results from last 7 days   Lab Units 09/17/22  0614 09/16/22  1746   LACTIC ACID mmol/L  --  1 9   PROCALCITONIN ng/ml 1 73* 0 57*       Lines/Drains:  Invasive Devices  Report    Peripheral Intravenous Line  Duration           Peripheral IV 09/17/22 Dorsal (posterior); Right Forearm <1 day                      Imaging: Reviewed radiology reports from this admission including: MRI spine    Recent Cultures (last 7 days):   Results from last 7 days   Lab Units 09/16/22  1747 09/14/22  1058 09/12/22  1421   BLOOD CULTURE Received in Microbiology Lab  Culture in Progress  --   --    GRAM STAIN RESULT  Gram negative rods*  --   --    URINE CULTURE   --  No Growth <1000 cfu/mL No Growth <1000 cfu/mL       Last 24 Hours Medication List:   Current Facility-Administered Medications   Medication Dose Route Frequency Provider Last Rate    acetaminophen  975 mg Oral [facility] Tyler Morales, DO      amLODIPine  10 mg Oral Daily Pj Fields MD      cefepime  2,000 mg Intravenous Q12H Pj Fields MD      heparin (porcine)  5,000 Units Subcutaneous [facility] [address] Wayne Morales, DO      lidocaine  1 patch Topical Daily Tyler Morales, DO      methocarbamol  500 mg Oral Q6H PRN Tyler Morales, DO      ondansetron  4 mg Intravenous Q6H PRN Tyler Morales, DO      oxyCODONE  5 mg Oral Q4H PRN Tyler Morales, DO      tamsulosin  0 4 mg Oral Daily Tyler Morales, DO          Today, Patient Was Seen By: Pj Fields MD    **Please Note: This note may have been constructed using a voice recognition system  **

## 2022-09-18 PROBLEM — B96.5 BACTEREMIA DUE TO PSEUDOMONAS: Status: ACTIVE | Noted: 2022-09-17

## 2022-09-18 LAB
ANION GAP SERPL CALCULATED.3IONS-SCNC: 3 MMOL/L (ref 4–13)
BACTERIA UR CULT: NORMAL
BUN SERPL-MCNC: 16 MG/DL (ref 5–25)
CALCIUM SERPL-MCNC: 8.4 MG/DL (ref 8.3–10.1)
CHLORIDE SERPL-SCNC: 103 MMOL/L (ref 96–108)
CO2 SERPL-SCNC: 31 MMOL/L (ref 21–32)
CREAT SERPL-MCNC: 0.98 MG/DL (ref 0.6–1.3)
GFR SERPL CREATININE-BSD FRML MDRD: 81 ML/MIN/1.73SQ M
GLUCOSE SERPL-MCNC: 100 MG/DL (ref 65–140)
POTASSIUM SERPL-SCNC: 3.9 MMOL/L (ref 3.5–5.3)
SODIUM SERPL-SCNC: 137 MMOL/L (ref 135–147)

## 2022-09-18 PROCEDURE — 99232 SBSQ HOSP IP/OBS MODERATE 35: CPT | Performed by: INTERNAL MEDICINE

## 2022-09-18 PROCEDURE — 80048 BASIC METABOLIC PNL TOTAL CA: CPT | Performed by: INTERNAL MEDICINE

## 2022-09-18 RX ADMIN — ACETAMINOPHEN 975 MG: 325 TABLET, FILM COATED ORAL at 09:59

## 2022-09-18 RX ADMIN — ACETAMINOPHEN 975 MG: 325 TABLET, FILM COATED ORAL at 14:13

## 2022-09-18 RX ADMIN — OXYCODONE HYDROCHLORIDE 5 MG: 5 TABLET ORAL at 09:58

## 2022-09-18 RX ADMIN — METHOCARBAMOL 500 MG: 500 TABLET, FILM COATED ORAL at 09:58

## 2022-09-18 RX ADMIN — METHOCARBAMOL 500 MG: 500 TABLET, FILM COATED ORAL at 18:21

## 2022-09-18 RX ADMIN — OXYCODONE HYDROCHLORIDE 5 MG: 5 TABLET ORAL at 14:15

## 2022-09-18 RX ADMIN — CEFEPIME 2000 MG: 2 INJECTION, POWDER, FOR SOLUTION INTRAVENOUS at 18:17

## 2022-09-18 RX ADMIN — AMLODIPINE BESYLATE 10 MG: 5 TABLET ORAL at 09:58

## 2022-09-18 RX ADMIN — CEFEPIME 2000 MG: 2 INJECTION, POWDER, FOR SOLUTION INTRAVENOUS at 06:24

## 2022-09-18 RX ADMIN — ACETAMINOPHEN 975 MG: 325 TABLET, FILM COATED ORAL at 22:04

## 2022-09-18 RX ADMIN — OXYCODONE HYDROCHLORIDE 5 MG: 5 TABLET ORAL at 18:17

## 2022-09-18 RX ADMIN — LIDOCAINE 5% 1 PATCH: 700 PATCH TOPICAL at 09:59

## 2022-09-18 RX ADMIN — OXYCODONE HYDROCHLORIDE 5 MG: 5 TABLET ORAL at 22:05

## 2022-09-18 RX ADMIN — TAMSULOSIN HYDROCHLORIDE 0.4 MG: 0.4 CAPSULE ORAL at 22:04

## 2022-09-18 NOTE — PROGRESS NOTES
[address] [facility]  Progress Note - Daly Castro 1960, 60 y o  male MRN: [id number]  Unit/Bed#: [facility] 322-01 Encounter: [id number]  Primary Care Provider: ABNER Graf   Date and time admitted to hospital: 9/14/2022  9:01 AM    Bacteremia due to Pseudomonas  Assessment & Plan  Secondary to UTI to recent urologic procedure  Await sensitivities  Continue cefepime  ID consult when sensitivities available    Sepsis [facility])  Assessment & Plan  Secondary to Pseudomonas bacteremia secondary to urologic procedure on 9/12  Patient stated he was prescribed prophylactic antibiotics postprocedure but did not take them  Noted fevers >102 the significant leukocytosis 22,000 on 9/16, no further fevers, leukocytosis resolved  Transition cefepime last night will continue  Tylenol for fevers    BPH  Assessment & Plan  - continue home Flomax    Essential hypertension  Assessment & Plan  Elevated suspect secondary to the pain component  Continue home amlodipine, dose increased, now well controlled    * Acute right-sided low back pain  Assessment & Plan  - presents with 5 day history of worsening low right back pain with radiation down the posterior lateral lower right extremity  - denies any saddle anesthesia or bladder/bowel dysfunction suggestive of cauda equina  - CT scan in the ER showing postsurgical changes and L4-L5 and L5-S1 bilateral foraminal narrowing    - patient with history of 3 lumbar back surgeries in the past including most recently a lumbar decompression in 2019    - multimodal pain control with IV Decadron, Tylenol ATC, Toradol p r n , Flexeril p r n , Norco p r n   - MRA without significant new findings continue conservative management including pain control, physical therapy  - continue serial neurologic exams  - seen by Neurosurgery, appreciated          VTE Pharmacologic Prophylaxis: VTE Score: 4 Moderate Risk (Score 3-4) - Pharmacological DVT Prophylaxis Ordered: heparin  Patient Centered Rounds: I performed bedside rounds with nursing staff today  Discussions with Specialists or Other Care Team Provider: brenna    Education and Discussions with Family / Patient: Patient declined call to   Time Spent for Care: 30 minutes  More than 50% of total time spent on counseling and coordination of care as described above  Current Length of Stay: 4 day(s)  Current Patient Status: Inpatient   Certification Statement: The patient will continue to require additional inpatient hospital stay due to Await Pseudomonas sensitivities  Discharge Plan: Anticipate discharge in 48-72 hrs to home  Code Status: Level 1 - Full Code    Subjective:   Patient seen and examined at bedside  No acute events overnight  Observed ambulating with walker in hallway without significant pain  The patient states he is feeling generally well and improved from 2 days ago when he experience fevers and significant night sweats  Objective:     Vitals:   Temp (24hrs), Av °F (37 2 °C), Min:98 2 °F (36 8 °C), Max:100 4 °F (38 °C)    Temp:  [98 2 °F (36 8 °C)-100 4 °F (38 °C)] 98 8 °F (37 1 °C)  HR:  [67-81] 67  Resp:  [16-18] 16  BP: (149-172)/(80-94) 172/94  SpO2:  [98 %] 98 %  There is no height or weight on file to calculate BMI  Input and Output Summary (last 24 hours):   No intake or output data in the 24 hours ending 22 0208    Physical Exam:   Physical Exam  Constitutional:       General: He is not in acute distress  Appearance: Normal appearance  He is ill-appearing  He is not diaphoretic  Cardiovascular:      Rate and Rhythm: Normal rate and regular rhythm  Pulmonary:      Effort: Pulmonary effort is normal  No respiratory distress  Abdominal:      General: Abdomen is flat  Palpations: Abdomen is soft     Musculoskeletal:      Comments: No significant lateral right hip tenderness , ambulation observed without significant pain   Neurological:      General: No focal deficit present  Mental Status: He is alert and oriented to person, place, and time  Sensory: No sensory deficit  Motor: No weakness  Additional Data:     Labs:  Results from last 7 days   Lab Units 09/17/22  0614   WBC Thousand/uL 7 22   HEMOGLOBIN g/dL 13 9   HEMATOCRIT % 42 4   PLATELETS Thousands/uL 191   NEUTROS PCT % 80*   LYMPHS PCT % 12*   MONOS PCT % 6   EOS PCT % 1     Results from last 7 days   Lab Units 09/18/22  0510 09/15/22  0524 09/14/22  1151   SODIUM mmol/L 137   < > 137   POTASSIUM mmol/L 3 9   < > 3 9   CHLORIDE mmol/L 103   < > 105   CO2 mmol/L 31   < > 28   BUN mg/dL 16   < > 29*   CREATININE mg/dL 0 98   < > 1 24   ANION GAP mmol/L 3*   < > 4   CALCIUM mg/dL 8 4   < > 8 6   ALBUMIN g/dL  --   --  3 7   TOTAL BILIRUBIN mg/dL  --   --  1 55*   ALK PHOS U/L  --   --  54   ALT U/L  --   --  26   AST U/L  --   --  11   GLUCOSE RANDOM mg/dL 100   < > 130    < > = values in this interval not displayed  Results from last 7 days   Lab Units 09/15/22  0524   INR  1 08             Results from last 7 days   Lab Units 09/17/22  0614 09/16/22  1746   LACTIC ACID mmol/L  --  1 9   PROCALCITONIN ng/ml 1 73* 0 57*       Lines/Drains:  Invasive Devices  Report    Peripheral Intravenous Line  Duration           Peripheral IV 09/17/22 Dorsal (posterior); Right Forearm 1 day                      Imaging: No pertinent imaging reviewed      Recent Cultures (last 7 days):   Results from last 7 days   Lab Units 09/16/22  1747 09/16/22  1641 09/14/22  1058 09/12/22  1421   BLOOD CULTURE  Pseudomonas aeruginosa*  --   --   --    GRAM STAIN RESULT  Gram negative rods*  Gram negative rods*  --   --   --    URINE CULTURE   --  No Growth <1000 cfu/mL No Growth <1000 cfu/mL No Growth <1000 cfu/mL       Last 24 Hours Medication List:   Current Facility-Administered Medications   Medication Dose Route Frequency Provider Last Rate    acetaminophen  975 mg Oral [facility] Wagner Rowe Andrew,       amLODIPine  10 mg Oral Daily Kulwant Moreno MD      cefepime  2,000 mg Intravenous Q12H Kulwant Moreno MD 2,000 mg (09/18/22 0624)    heparin (porcine)  5,000 Units Subcutaneous [facility] Augustin Lyons Memory, DO      lidocaine  1 patch Topical Daily Augustin Morales, DO      methocarbamol  500 mg Oral Q6H PRN Augustin Morales, DO      ondansetron  4 mg Intravenous Q6H PRN Augustin Morales, DO      oxyCODONE  5 mg Oral Q4H PRN Augustin Morales, DO      tamsulosin  0 4 mg Oral Daily Augustin Morales, DO          Today, Patient Was Seen By: Kulwant Moreno MD    **Please Note: This note may have been constructed using a voice recognition system  **

## 2022-09-18 NOTE — PLAN OF CARE
Problem: PAIN - ADULT  Goal: Verbalizes/displays adequate comfort level or baseline comfort level  Description: Interventions:  - Encourage patient to monitor pain and request assistance  - Assess pain using appropriate pain scale  - Administer analgesics based on type and severity of pain and evaluate response  - Implement non-pharmacological measures as appropriate and evaluate response  - Consider cultural and social influences on pain and pain management  - Notify physician/advanced practitioner if interventions unsuccessful or patient reports new pain  Outcome: Progressing     Problem: SAFETY ADULT  Goal: Patient will remain free of falls  Description: INTERVENTIONS:  - Educate patient/family on patient safety including physical limitations  - Instruct patient to call for assistance with activity   - Consult OT/PT to assist with strengthening/mobility   - Keep Call bell within reach  - Keep bed low and locked with side rails adjusted as appropriate  - Keep care items and personal belongings within reach  - Initiate and maintain comfort rounds  - Make Fall Risk Sign visible to staff  - Offer Toileting every  Hours, in advance of need  - Initiate/Maintain alarm  - Obtain necessary fall risk management equipment:   - Apply yellow socks and bracelet for high fall risk patients  - Consider moving patient to room near nurses station  Outcome: Progressing  Goal: Maintain or return to baseline ADL function  Description: INTERVENTIONS:  -  Assess patient's ability to carry out ADLs; assess patient's baseline for ADL function and identify physical deficits which impact ability to perform ADLs (bathing, care of mouth/teeth, toileting, grooming, dressing, etc )  - Assess/evaluate cause of self-care deficits   - Assess range of motion  - Assess patient's mobility; develop plan if impaired  - Assess patient's need for assistive devices and provide as appropriate  - Encourage maximum independence but intervene and supervise when necessary  - Involve family in performance of ADLs  - Assess for home care needs following discharge   - Consider OT consult to assist with ADL evaluation and planning for discharge  - Provide patient education as appropriate  Outcome: Progressing     Problem: MOBILITY - ADULT  Goal: Maintain or return to baseline ADL function  Description: INTERVENTIONS:  -  Assess patient's ability to carry out ADLs; assess patient's baseline for ADL function and identify physical deficits which impact ability to perform ADLs (bathing, care of mouth/teeth, toileting, grooming, dressing, etc )  - Assess/evaluate cause of self-care deficits   - Assess range of motion  - Assess patient's mobility; develop plan if impaired  - Assess patient's need for assistive devices and provide as appropriate  - Encourage maximum independence but intervene and supervise when necessary  - Involve family in performance of ADLs  - Assess for home care needs following discharge   - Consider OT consult to assist with ADL evaluation and planning for discharge  - Provide patient education as appropriate  Outcome: Progressing     Problem: Potential for Falls  Goal: Patient will remain free of falls  Description: INTERVENTIONS:  - Educate patient/family on patient safety including physical limitations  - Instruct patient to call for assistance with activity   - Consult OT/PT to assist with strengthening/mobility   - Keep Call bell within reach  - Keep bed low and locked with side rails adjusted as appropriate  - Keep care items and personal belongings within reach  - Initiate and maintain comfort rounds  - Make Fall Risk Sign visible to staff  - Offer Toileting every  Hours, in advance of need  - Initiate/Maintain alarm  - Obtain necessary fall risk management equipment:   - Apply yellow socks and bracelet for high fall risk patients  - Consider moving patient to room near nurses station  Outcome: Progressing

## 2022-09-18 NOTE — ASSESSMENT & PLAN NOTE
Secondary to UTI to recent urologic procedure  Await sensitivities  Continue cefepime  ID consult when sensitivities available

## 2022-09-18 NOTE — ASSESSMENT & PLAN NOTE
Secondary to Pseudomonas bacteremia secondary to urologic procedure on 9/12  Patient stated he was prescribed prophylactic antibiotics postprocedure but did not take them  Noted fevers >102 the significant leukocytosis 22,000 on 9/16, no further fevers, leukocytosis resolved  Transition cefepime last night will continue  Tylenol for fevers

## 2022-09-19 ENCOUNTER — APPOINTMENT (INPATIENT)
Dept: RADIOLOGY | Facility: HOSPITAL | Age: 62
DRG: 862 | End: 2022-09-19
Payer: COMMERCIAL

## 2022-09-19 LAB
ANION GAP SERPL CALCULATED.3IONS-SCNC: 1 MMOL/L (ref 4–13)
BACTERIA BLD CULT: ABNORMAL
BACTERIA BLD CULT: ABNORMAL
BASOPHILS # BLD AUTO: 0.02 THOUSANDS/ΜL (ref 0–0.1)
BASOPHILS NFR BLD AUTO: 0 % (ref 0–1)
BUN SERPL-MCNC: 18 MG/DL (ref 5–25)
CALCIUM SERPL-MCNC: 8.6 MG/DL (ref 8.3–10.1)
CHLORIDE SERPL-SCNC: 101 MMOL/L (ref 96–108)
CO2 SERPL-SCNC: 33 MMOL/L (ref 21–32)
CREAT SERPL-MCNC: 1.05 MG/DL (ref 0.6–1.3)
EOSINOPHIL # BLD AUTO: 0.36 THOUSAND/ΜL (ref 0–0.61)
EOSINOPHIL NFR BLD AUTO: 8 % (ref 0–6)
ERYTHROCYTE [DISTWIDTH] IN BLOOD BY AUTOMATED COUNT: 13.5 % (ref 11.6–15.1)
GFR SERPL CREATININE-BSD FRML MDRD: 75 ML/MIN/1.73SQ M
GLUCOSE SERPL-MCNC: 109 MG/DL (ref 65–140)
GRAM STN SPEC: ABNORMAL
GRAM STN SPEC: ABNORMAL
HCT VFR BLD AUTO: 35.7 % (ref 36.5–49.3)
HGB BLD-MCNC: 11.6 G/DL (ref 12–17)
IMM GRANULOCYTES # BLD AUTO: 0.04 THOUSAND/UL (ref 0–0.2)
IMM GRANULOCYTES NFR BLD AUTO: 1 % (ref 0–2)
LYMPHOCYTES # BLD AUTO: 1.18 THOUSANDS/ΜL (ref 0.6–4.47)
LYMPHOCYTES NFR BLD AUTO: 25 % (ref 14–44)
MCH RBC QN AUTO: 29.7 PG (ref 26.8–34.3)
MCHC RBC AUTO-ENTMCNC: 32.5 G/DL (ref 31.4–37.4)
MCV RBC AUTO: 92 FL (ref 82–98)
MONOCYTES # BLD AUTO: 0.68 THOUSAND/ΜL (ref 0.17–1.22)
MONOCYTES NFR BLD AUTO: 14 % (ref 4–12)
NEUTROPHILS # BLD AUTO: 2.46 THOUSANDS/ΜL (ref 1.85–7.62)
NEUTS SEG NFR BLD AUTO: 52 % (ref 43–75)
NRBC BLD AUTO-RTO: 0 /100 WBCS
P AERUGINOSA DNA BLD POS NAA+NON-PROBE: DETECTED
PLATELET # BLD AUTO: 161 THOUSANDS/UL (ref 149–390)
PMV BLD AUTO: 9 FL (ref 8.9–12.7)
POTASSIUM SERPL-SCNC: 4.2 MMOL/L (ref 3.5–5.3)
RBC # BLD AUTO: 3.9 MILLION/UL (ref 3.88–5.62)
SODIUM SERPL-SCNC: 135 MMOL/L (ref 135–147)
WBC # BLD AUTO: 4.74 THOUSAND/UL (ref 4.31–10.16)

## 2022-09-19 PROCEDURE — 97164 PT RE-EVAL EST PLAN CARE: CPT

## 2022-09-19 PROCEDURE — 97168 OT RE-EVAL EST PLAN CARE: CPT

## 2022-09-19 PROCEDURE — 99255 IP/OBS CONSLTJ NEW/EST HI 80: CPT | Performed by: INTERNAL MEDICINE

## 2022-09-19 PROCEDURE — 80048 BASIC METABOLIC PNL TOTAL CA: CPT | Performed by: INTERNAL MEDICINE

## 2022-09-19 PROCEDURE — 85025 COMPLETE CBC W/AUTO DIFF WBC: CPT | Performed by: INTERNAL MEDICINE

## 2022-09-19 PROCEDURE — 99232 SBSQ HOSP IP/OBS MODERATE 35: CPT | Performed by: FAMILY MEDICINE

## 2022-09-19 RX ORDER — ENOXAPARIN SODIUM 100 MG/ML
40 INJECTION SUBCUTANEOUS
Status: DISCONTINUED | OUTPATIENT
Start: 2022-09-19 | End: 2022-09-20 | Stop reason: HOSPADM

## 2022-09-19 RX ORDER — TAMSULOSIN HYDROCHLORIDE 0.4 MG/1
CAPSULE ORAL
Qty: 90 CAPSULE | Refills: 1 | Status: SHIPPED | OUTPATIENT
Start: 2022-09-19 | End: 2022-09-20

## 2022-09-19 RX ORDER — DIAPER,BRIEF,INFANT-TODD,DISP
EACH MISCELLANEOUS 4 TIMES DAILY PRN
Status: DISCONTINUED | OUTPATIENT
Start: 2022-09-19 | End: 2022-09-20 | Stop reason: HOSPADM

## 2022-09-19 RX ADMIN — CEFEPIME 2000 MG: 2 INJECTION, POWDER, FOR SOLUTION INTRAVENOUS at 17:48

## 2022-09-19 RX ADMIN — TAMSULOSIN HYDROCHLORIDE 0.4 MG: 0.4 CAPSULE ORAL at 21:06

## 2022-09-19 RX ADMIN — ENOXAPARIN SODIUM 40 MG: 40 INJECTION SUBCUTANEOUS at 09:23

## 2022-09-19 RX ADMIN — METHOCARBAMOL 500 MG: 500 TABLET, FILM COATED ORAL at 09:37

## 2022-09-19 RX ADMIN — CEFEPIME 2000 MG: 2 INJECTION, POWDER, FOR SOLUTION INTRAVENOUS at 05:42

## 2022-09-19 RX ADMIN — METHOCARBAMOL 500 MG: 500 TABLET, FILM COATED ORAL at 17:47

## 2022-09-19 RX ADMIN — ACETAMINOPHEN 975 MG: 325 TABLET, FILM COATED ORAL at 21:06

## 2022-09-19 RX ADMIN — IOHEXOL 50 ML: 240 INJECTION, SOLUTION INTRATHECAL; INTRAVASCULAR; INTRAVENOUS; ORAL at 22:30

## 2022-09-19 RX ADMIN — ACETAMINOPHEN 975 MG: 325 TABLET, FILM COATED ORAL at 13:30

## 2022-09-19 RX ADMIN — OXYCODONE HYDROCHLORIDE 5 MG: 5 TABLET ORAL at 22:14

## 2022-09-19 RX ADMIN — AMLODIPINE BESYLATE 10 MG: 5 TABLET ORAL at 09:23

## 2022-09-19 RX ADMIN — HYDROCORTISONE 1 APPLICATION: 1 OINTMENT TOPICAL at 17:48

## 2022-09-19 RX ADMIN — ACETAMINOPHEN 975 MG: 325 TABLET, FILM COATED ORAL at 05:42

## 2022-09-19 RX ADMIN — OXYCODONE HYDROCHLORIDE 5 MG: 5 TABLET ORAL at 17:47

## 2022-09-19 RX ADMIN — OXYCODONE HYDROCHLORIDE 5 MG: 5 TABLET ORAL at 10:10

## 2022-09-19 RX ADMIN — OXYCODONE HYDROCHLORIDE 5 MG: 5 TABLET ORAL at 05:49

## 2022-09-19 RX ADMIN — LIDOCAINE 5% 1 PATCH: 700 PATCH TOPICAL at 09:24

## 2022-09-19 NOTE — PHYSICAL THERAPY NOTE
Physical Therapy Re-Evaluation     Patient's Name: Enriqueta Noland    Admitting Diagnosis  Back pain [M54 9]  Lower back pain [M54 50]    Problem List  Patient Active Problem List   Diagnosis    Essential hypertension    BPH    History of bladder cancer    Right footdrop due to L3-L4 disc protrusion     [place] syndrome    Right foot drop    Anxiety    Primary insomnia    Left foot drop    Lumbar radiculopathy    Allergic rhinitis    Eustachian tube dysfunction    Chronic prostatitis    Hyperlipidemia    Erectile dysfunction    Arthritis of carpometacarpal (CMC) joint of both thumbs    Acute right-sided low back pain without sciatica    Skin lesion    Acute right-sided low back pain    Sepsis ([facility] )    Bacteremia due to Pseudomonas       Past Medical History  Past Medical History:   Diagnosis Date    Cancer ([facility] )     Cancer ([facility] ) 11/04/2019    Bladder 3-4 yrs ago    Chronic pain disorder     Dry skin dermatitis     Dyspepsia     last assessed 09/27/17    Hypertension        Past Surgical History  Past Surgical History:   Procedure Laterality Date    BACK SURGERY Left     left with foot drop after surgery    BLADDER SURGERY      COLONOSCOPY  2012    [facility] results not known    COLONOSCOPY  05/2020    1 polyp hyperplastic/internal hemorrhoids    EGD  10/15/2015    Nonbleeding erosive gastropathy-biopsy normal   Antral erosions biopsy negative for H  pylori    EGD  09/08/2020    Moderately severe esophagitis otherwise normal   Biopsy esophagus showed acute Candida esophagitis negative for dysplasia or carcinoma negative for herpes simplex virus is negative for cytomegaly a virus  Biopsy stomach negative for H  pylori    EGD  05/2020    One millimeter gastric ulcer-biopsy negative for H  pylori    Erosive gastritis    EGD  09/08/2020    Gastric ulcer resolve the entire stomach normal   Moderate to severe esophagitis-biopsy positive for Candida    POSTERIOR LAMINECTOMY THORACIC AND LUMBAR SPINE Right 12/17/2018 Procedure: LAMINECTOMY LUMBAR LAMINECTOMY, DISCECTOMY, FORAMINOTOMY, L3-4, RIGHT;  Surgeon: Callie Mcdaniel MD;  [facility];  Service: Neurosurgery    MN LAMNOTMY INCL W/DCMPRSN NRV ROOT 1 INTRSPC LUMBR Right 11/12/2019    Procedure: LUMBAR LAMINOTOMY/FORAMINOTOMY/FACETECTOMY/DISCECTOMY L2-3, RIGHT;  Surgeon: Callie Mcdaniel MD;  Location: [facility];  Service: Neurosurgery        09/19/22 1205   PT Last Visit   PT Visit Date 09/19/22   Note Type   Note type Re-Evaluation   Additional Comments New PT consult recieved  PT spoke to pt and pt requesting to be seen again  Pain Assessment   Pain Assessment Tool 0-10   Pain Score 4   Pain Location/Orientation Location: Back   Hospital Pain Intervention(s) Ambulation/increased activity   Restrictions/Precautions   Weight Bearing Precautions Per Order No   Other Precautions Pain; Fall Risk;Multiple lines; Impulsive   Home Living   Type of [address] Two level;Bed/bath upstairs   Bathroom Shower/Tub Tub/shower unit   Bathroom Toilet Standard   Bathroom Accessibility Accessible   Prior Function   Level of [place] Independent with ADLs and functional mobility   Lives With Spouse   Receives Help From Family   ADL Assistance Independent   IADLs Independent   Falls in the last 6 months 0   Vocational Full time employment   General   Family/Caregiver Present No   Cognition   Orientation Level Oriented X4   RLE Assessment   RLE Assessment X  (Chronic grossly decreased strength in LE with B/L foot drop)   LLE Assessment   LLE Assessment X  (Chronic grossly decreased strength in LE with B/L foot drop)   Coordination   Movements are Fluid and Coordinated 0   Coordination and Movement Description ataxia, foot drop, increased hip flexion   Light Touch   RLE Light Touch Impaired   Bed Mobility   Supine to Sit 6  Modified independent   Sit to Supine   (pt left resting in chair as requested)   Transfers   Sit to Stand 5  Supervision   Stand to Sit 5  Supervision Stand pivot 5  Supervision   Ambulation/Elevation   Gait pattern Excessively slow; Ataxia; Steppage;Decreased foot clearance; Improper Weight shift   Gait Assistance 5  Supervision   Additional items Verbal cues; Tactile cues   Assistive Device Rolling walker  (short distance ambulation in room without AD)   Distance 130x3   Balance   Static Sitting Good   Dynamic Sitting Fair +   Static Standing Fair +   Dynamic Standing Fair   Ambulatory Fair   Endurance Deficit   Endurance Deficit Yes   Endurance Deficit Description limited by pain, weakness, fatigue   Activity Tolerance   Activity Tolerance Patient limited by fatigue;Patient limited by pain   Nurse Made Aware yes, nsg gave clearance to work with pt   Assessment   Prognosis Good   Problem List Pain;Decreased safety awareness;Decreased mobility; Impaired balance   Assessment Pt is 60 y o  male seen for PT re-evaluation s/p admit to [facility] on 9/14/2022 w/ Acute right-sided low back pain  PT spoke to pt 2* to recent PT evaluation and pt requesting additional assessment  PT consulted to assess pt's functional mobility and d/c needs  Order placed for PT eval and tx, w/ up w/ A order  Comorbidities affecting pt's physical performance at time of assessment include:  has a past medical history of Cancer ([facility] [facility] ), Cancer ([facility] ), Chronic pain disorder, Dry skin dermatitis, Dyspepsia, and Hypertension  PTA, pt was ambulates community distances and elevations, lives in multi-level home and pt reports at baseline using B/L AFO which he does not have with him  Personal factors affecting pt at time of IE include: ambulating w/ assistive device, stairs to enter home, impulsivity, unable to perform physical activity, inability to perform IADLs and inability to perform ADLs  Pt ambulated with slow although overall steady gait  Noted single episode of absent toe clearance with foot drop  Noted compensation with increased hip flexion for chronic LE weakness   Pt noted mild progression in pain post ambulation  Discussed pacing to improve pain tolerance during mobility  Please find objective findings from PT assessment regarding body systems outlined above with impairments and limitations including weakness, impaired balance, gait deviations and pain  The following objective measures performed on IE also reveal limitations: AM-PAC 6-Clicks: 48/37  Pt's clinical presentation is currently unstable/unpredictable seen in pt's presentation of pain  Pt to benefit from continued ambulation with staff level of functional independent mobility and consistency  From PT/mobility standpoint, recommendation at time of d/c would be home with outpatient rehabilitation pending progress in order to facilitate return to PLOF  Barriers to Discharge None   Goals   Patient Goals To decrease pain   Plan   PT Frequency   (D/C PT)   Recommendation   PT Discharge Recommendation Home with outpatient rehabilitation   Equipment Recommended [facility] Recommended Wheeled walker   Change/add to [employer]?  No   AM-PAC Basic Mobility Inpatient   Turning in Bed Without Bedrails 4   Lying on Back to Sitting on Edge of Flat Bed 4   Moving Bed to Chair 3   Standing Up From Chair 3   Walk in Room 3   Climb 3-5 Stairs 3   Basic Mobility Inpatient Raw Score 20   Basic Mobility Standardized Score 43 99   Highest Level Of Mobility   JH-HLM Goal 6: Walk 10 steps or more   JH-HLM Achieved 7: Walk 25 feet or more           Amita Ibanez, PT

## 2022-09-19 NOTE — ASSESSMENT & PLAN NOTE
Secondary to Pseudomonas bacteremia secondary to urologic procedure on 9/12  Patient stated he was prescribed prophylactic antibiotics postprocedure but did not take them  Noted fevers >102 the significant leukocytosis 22,000 on 9/16, no further fevers, leukocytosis resolved  Secondary to bacteremia

## 2022-09-19 NOTE — OCCUPATIONAL THERAPY NOTE
Occupational Therapy Re-Evaluation     Patient Name: Timothy Simon  [id number] Date: 9/19/2022  Problem List  Principal Problem:    Acute right-sided low back pain  Active Problems:    Essential hypertension    BPH    Sepsis ([facility] )    Bacteremia due to Pseudomonas    Past Medical History  Past Medical History:   Diagnosis Date    Cancer ([facility] )     Cancer ([facility] ) 11/04/2019    Bladder 3-4 yrs ago    Chronic pain disorder     Dry skin dermatitis     Dyspepsia     last assessed 09/27/17    Hypertension      Past Surgical History  Past Surgical History:   Procedure Laterality Date    BACK SURGERY Left     left with foot drop after surgery    BLADDER SURGERY      COLONOSCOPY  2012    [facility] results not known    COLONOSCOPY  05/2020    1 polyp hyperplastic/internal hemorrhoids    EGD  10/15/2015    Nonbleeding erosive gastropathy-biopsy normal   Antral erosions biopsy negative for H  pylori    EGD  09/08/2020    Moderately severe esophagitis otherwise normal   Biopsy esophagus showed acute Candida esophagitis negative for dysplasia or carcinoma negative for herpes simplex virus is negative for cytomegaly a virus  Biopsy stomach negative for H  pylori    EGD  05/2020    One millimeter gastric ulcer-biopsy negative for H  pylori    Erosive gastritis    EGD  09/08/2020    Gastric ulcer resolve the entire stomach normal   Moderate to severe esophagitis-biopsy positive for Candida    POSTERIOR LAMINECTOMY THORACIC AND LUMBAR SPINE Right 12/17/2018    Procedure: LAMINECTOMY LUMBAR LAMINECTOMY, DISCECTOMY, FORAMINOTOMY, L3-4, RIGHT;  Surgeon: Tamela Pal MD;  Location:  MAIN OR;  Service: Neurosurgery    NH LAMNOTMY INCL W/DCMPRSN NRV ROOT 1 INTRSPC LUMBR Right 11/12/2019    Procedure: LUMBAR LAMINOTOMY/FORAMINOTOMY/FACETECTOMY/DISCECTOMY L2-3, RIGHT;  Surgeon: Tamela Pal MD;  Location:  MAIN OR;  Service: Neurosurgery         09/19/22 1200   OT Last Visit   OT Visit Date 09/19/22   Note Type   Note type Re-Evaluation Restrictions/Precautions   Weight Bearing Precautions Per Order No   Pain Assessment   Pain Assessment Tool 0-10   Pain Score 4   Pain Location/Orientation Location: Back   Home Living   Type of [address] Two level;Bed/bath upstairs; Laundry in basement  ([address])   Bathroom Shower/Tub Tub/shower unit   Bathroom Toilet Standard   Prior Function   Level of [place] Independent with ADLs and functional mobility   Lives With [place] Help From Family   ADL Assistance Independent   IADLs Independent   Falls in the last 6 months 0   Vocational Full time employment   Lifestyle   Autonomy I adls and mobility - i adls - shares homemaking with spouse   Reciprocal Relationships supportive spouse who is able to assist - dtr lives in [facility] to 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   [address] enjoys golfing and horses   Subjective   Subjective "I'm doing terrible"   ADL   Eating Assistance 7  Independent   Grooming Assistance 5  Supervision/Setup   [address] 5  Supervision/Setup   [address] 5  Supervision/Setup   UB Dressing Assistance 5  Supervision/Setup   LB Dressing Assistance 5  [address]  5  Supervision/Setup   Bed Mobility   Supine to Sit 6  Modified independent   Transfers   Sit to Stand 5  Supervision   Stand to Sit 5  Supervision   Stand pivot 5  Supervision   Functional Mobility   Functional Mobility 5  Supervision   Additional items Rolling walker   Balance   Static Sitting Good   Dynamic Sitting Fair +   [place]   Activity Tolerance   Activity Tolerance Patient limited by fatigue;Patient limited by pain   RUE Assessment   RUE Assessment WFL   LUE Assessment   LUE Assessment WFL   Cognition   Overall Cognitive Status WFL   Comments noted to move quickly and impusively at times with limited safety awareness - tends to dismiss therapists suggestions for improved safety   Assessment   Limitation Decreased endurance;Decreased high-level ADLs   Prognosis Good   Assessment Pt seen for OT re-eval as orders by MD - continues to function at S level with all adls and mobility - limited by pain and chronic B foot drop - has supportive family who are able to provide assist prn -anticipate d/c home when medically cleared- no acute OT needs indicated - d/c from caseload   Goals   Patient Goals have less pain and go home   Plan   OT Frequency Eval only   Recommendation   OT Discharge Recommendation No rehabilitation needs   AM-PAC Daily Activity Inpatient   Lower Body Dressing 4   Bathing 4   Toileting 4   Upper Body Dressing 4   Grooming 4   Eating 4   Daily Activity Raw Score 24   Daily Activity Standardized Score (Calc for Raw Score >=11) 57 54   AM-PAC Applied Cognition Inpatient   Following a Speech/Presentation 4   Understanding Ordinary Conversation 4   Taking Medications 4   Remembering Where Things Are Placed or Put Away 4   Remembering List of 4-5 Errands 4   Taking Care of Complicated Tasks 4   Applied Cognition Raw Score 24   Applied Cognition Standardized Score 62 21     The patient's raw score on the AM-PAC Daily Activity inpatient short form is 24, standardized score is 57 54, greater than 39 4  Patients at this level are likely to benefit from discharge to home  Please refer to the recommendation of the Occupational Therapist for safe discharge planning      Maureen Amaya

## 2022-09-19 NOTE — PLAN OF CARE
Problem: PAIN - ADULT  Goal: Verbalizes/displays adequate comfort level or baseline comfort level  Description: Interventions:  - Encourage patient to monitor pain and request assistance  - Assess pain using appropriate pain scale  - Administer analgesics based on type and severity of pain and evaluate response  - Implement non-pharmacological measures as appropriate and evaluate response  - Consider cultural and social influences on pain and pain management  - Notify physician/advanced practitioner if interventions unsuccessful or patient reports new pain  Outcome: Not Progressing     Problem: INFECTION - ADULT  Goal: Absence or prevention of progression during hospitalization  Description: INTERVENTIONS:  - Assess and monitor for signs and symptoms of infection  - Monitor lab/diagnostic results  - Monitor all insertion sites, i e  indwelling lines, tubes, and drains  - Monitor endotracheal if appropriate and nasal secretions for changes in amount and color  - [place] appropriate cooling/warming therapies per order  - Administer medications as ordered  - Instruct and encourage patient and family to use good hand hygiene technique  - Identify and instruct in appropriate isolation precautions for identified infection/condition  Outcome: Not Progressing  Goal: Absence of fever/infection during neutropenic period  Description: INTERVENTIONS:  - Monitor WBC    Outcome: Not Progressing     Problem: SAFETY ADULT  Goal: Patient will remain free of falls  Description: INTERVENTIONS:  - Educate patient/family on patient safety including physical limitations  - Instruct patient to call for assistance with activity   - Consult OT/PT to assist with strengthening/mobility   - Keep Call bell within reach  - Keep bed low and locked with side rails adjusted as appropriate  - Keep care items and personal belongings within reach  - Initiate and maintain comfort rounds  - Make Fall Risk Sign visible to staff  - Offer Toileting every  Hours, in advance of need  - Initiate/Maintain alarm  - Obtain necessary fall risk management equipment:   - Apply yellow socks and bracelet for high fall risk patients  - Consider moving patient to room near nurses station  Outcome: Not Progressing  Goal: Maintain or return to baseline ADL function  Description: INTERVENTIONS:  -  Assess patient's ability to carry out ADLs; assess patient's baseline for ADL function and identify physical deficits which impact ability to perform ADLs (bathing, care of mouth/teeth, toileting, grooming, dressing, etc )  - Assess/evaluate cause of self-care deficits   - Assess range of motion  - Assess patient's mobility; develop plan if impaired  - Assess patient's need for assistive devices and provide as appropriate  - Encourage maximum independence but intervene and supervise when necessary  - Involve family in performance of ADLs  - Assess for home care needs following discharge   - Consider OT consult to assist with ADL evaluation and planning for discharge  - Provide patient education as appropriate  Outcome: Not Progressing  Goal: Maintains/Returns to pre admission functional level  Description: INTERVENTIONS:  - Perform BMAT or MOVE assessment daily    - Set and communicate daily mobility goal to care team and patient/family/caregiver  - Collaborate with rehabilitation services on mobility goals if consulted  - Perform Range of Motion  times a day  - Reposition patient every  hours    - Dangle patient  times a day  - Stand patient  times a day  - Ambulate patient  times a day  - Out of bed to chair  times a day   - Out of bed for meals  times a day  - Out of bed for toileting  - Record patient progress and toleration of activity level   Outcome: Not Progressing     Problem: DISCHARGE PLANNING  Goal: Discharge to home or other facility with appropriate resources  Description: INTERVENTIONS:  - Identify barriers to discharge w/patient and caregiver  - Arrange for needed discharge resources and transportation as appropriate  - Identify discharge learning needs (meds, wound care, etc )  - Arrange for interpretive services to assist at discharge as needed  - Refer to Case Management Department for coordinating discharge planning if the patient needs post-hospital services based on physician/advanced practitioner order or complex needs related to functional status, cognitive ability, or social support system  Outcome: Not Progressing     Problem: Knowledge Deficit  Goal: Patient/family/caregiver demonstrates understanding of disease process, treatment plan, medications, and discharge instructions  Description: Complete learning assessment and assess knowledge base  Interventions:  - Provide teaching at level of understanding  - Provide teaching via preferred learning methods  Outcome: Not Progressing     Problem: MOBILITY - ADULT  Goal: Maintain or return to baseline ADL function  Description: INTERVENTIONS:  -  Assess patient's ability to carry out ADLs; assess patient's baseline for ADL function and identify physical deficits which impact ability to perform ADLs (bathing, care of mouth/teeth, toileting, grooming, dressing, etc )  - Assess/evaluate cause of self-care deficits   - Assess range of motion  - Assess patient's mobility; develop plan if impaired  - Assess patient's need for assistive devices and provide as appropriate  - Encourage maximum independence but intervene and supervise when necessary  - Involve family in performance of ADLs  - Assess for home care needs following discharge   - Consider OT consult to assist with ADL evaluation and planning for discharge  - Provide patient education as appropriate  Outcome: Not Progressing  Goal: Maintains/Returns to pre admission functional level  Description: INTERVENTIONS:  - Perform BMAT or MOVE assessment daily    - Set and communicate daily mobility goal to care team and patient/family/caregiver     - Collaborate with rehabilitation services on mobility goals if consulted  - Perform Range of Motion  times a day  - Reposition patient every  hours    - Dangle patient  times a day  - Stand patient  times a day  - Ambulate patient  times a day  - Out of bed to chair  times a day   - Out of bed for meals  times a day  - Out of bed for toileting  - Record patient progress and toleration of activity level   Outcome: Not Progressing     Problem: Potential for Falls  Goal: Patient will remain free of falls  Description: INTERVENTIONS:  - Educate patient/family on patient safety including physical limitations  - Instruct patient to call for assistance with activity   - Consult OT/PT to assist with strengthening/mobility   - Keep Call bell within reach  - Keep bed low and locked with side rails adjusted as appropriate  - Keep care items and personal belongings within reach  - Initiate and maintain comfort rounds  - Make Fall Risk Sign visible to staff  - Offer Toileting every  Hours, in advance of need  - Initiate/Maintain alarm  - Obtain necessary fall risk management equipment  - Apply yellow socks and bracelet for high fall risk patients  - Consider moving patient to room near nurses station  Outcome: Not Progressing

## 2022-09-19 NOTE — CONSULTS
Consultation - Infectious Disease   Beatrice Gagnon 60 y o  male MRN: [id number]  Unit/Bed#: [facility] 322-01 Encounter: [id number]      IMPRESSION & RECOMMENDATIONS:   Impression/Recommendations:  1  Sepsis  Evolving 9/16: Fever, tachycardia  Due to # 2  No other appreciable source  Initially presented with right-sided sciatica although CT/MRI of spine negative for acute infectious process  Clinically stable and nontoxic  Improving  Rec:  · Continue antibiotics as below  · Follow temperatures and WBC closely  · Supportive care as per the primary service    2  Pseudomonas bacteremia  Consider  source given recent cystoscopy, pyuria seen on UA although urine culture collected prior antibiotics negative  No other appreciable source  Abdominal exam benign  LFTs normal   Rec:  · Continue high-dose cefepime  · Check CT A/P     3  Acute on chronic right sciatica  Consider exacerbation by recent golf activity  Unclear relation to above  CT/MRI L-spine negative  Rec:  · Analgesia per primary service  · Close neurosurgery follow-up ongoing    4  Bladder cancer  Undergoes routine surveillance cystoscopy  Possible risk factor for infection as above  The above impression and plan was discussed in detail with the patient  Antibiotics:  Cefepime #3    Thank you for this consultation  We will follow along with you  HISTORY OF PRESENT ILLNESS:  Reason for Consult: Pseudomonal bacteremia    HPI: Beatrice Gagnon is a 60 y o  male with chronic low back history with prior lumbar surgery, sciatica, bilateral foot drop  He reports that approximately 10 days ago after playing a round of golf he started to develop right-sided buttock pain radiating to his right calf  He was evaluated by his PCP and placed on a Medrol Dosepak    Approximately 3 days later he underwent routine cystoscopy for bladder cancer surveillance at an outside hospital   He states he is normally prescribed Cipro amanda procedure although he was unable to get this from the pharmacy due to his pain  As the week progressed he developed worsening pain as well as some pain with urination  He ultimately presented to the emergency department on 09/14 for his pain  Upon presentation he was noted to have leukocytosis  He was evaluated by Neurosurgery and underwent both CT and MRI of the spine which showed no concern for acute mass or infection  On HD#3 he developed fever and was started on Cipro  Blood cultures have come back growing Pseudomonas in his antibiotics were changed to cefepime  We are asked to comment on further evaluation and management  In performing this consult, I have reviewed prior admission and outpatient visit records in detail  REVIEW OF SYSTEMS:  Denies abdominal pain, nausea, vomiting, diarrhea  A complete system-based review of systems is otherwise negative  PAST MEDICAL HISTORY:  Past Medical History:   Diagnosis Date    [facility] )     [facility] 11/04/2019    Bladder 3-4 yrs ago    Chronic pain disorder     Dry skin dermatitis     Dyspepsia     last assessed 09/27/17    Hypertension      Past Surgical History:   Procedure Laterality Date    BACK SURGERY Left     left with foot drop after surgery    BLADDER SURGERY      COLONOSCOPY  2012    [place] results not known    COLONOSCOPY  05/2020    1 polyp hyperplastic/internal hemorrhoids    EGD  10/15/2015    Nonbleeding erosive gastropathy-biopsy normal   Antral erosions biopsy negative for H  pylori    EGD  09/08/2020    Moderately severe esophagitis otherwise normal   Biopsy esophagus showed acute Candida esophagitis negative for dysplasia or carcinoma negative for herpes simplex virus is negative for cytomegaly a virus  Biopsy stomach negative for H  pylori    EGD  05/2020    One millimeter gastric ulcer-biopsy negative for H  pylori    Erosive gastritis    EGD  09/08/2020    Gastric ulcer resolve the entire stomach normal   Moderate to severe esophagitis-biopsy positive for Candida    POSTERIOR LAMINECTOMY THORACIC AND LUMBAR SPINE Right 2018    Procedure: LAMINECTOMY LUMBAR LAMINECTOMY, DISCECTOMY, FORAMINOTOMY, L3-4, RIGHT;  Surgeon: Deana Joshi MD;  [facility]: [facility];  Service: Neurosurgery    MO LAMNOTMY INCL W/DCMPRSN NRV ROOT 1 INTRSPC LUMBR Right 2019    Procedure: LUMBAR LAMINOTOMY/FORAMINOTOMY/FACETECTOMY/DISCECTOMY L2-3, RIGHT;  Surgeon: Deana Joshi MD;  Location: [facility];  Service: Neurosurgery       FAMILY HISTORY:  Non-contributory    SOCIAL HISTORY:  Social History     Substance and Sexual Activity   Alcohol Use Yes     Social History     Substance and Sexual Activity   Drug Use Yes    Types: Marijuana    Comment: rare     Social History     Tobacco Use   Smoking Status Never Smoker   Smokeless Tobacco Never Used       ALLERGIES:  Allergies   Allergen Reactions    Viagra [Sildenafil] Headache       MEDICATIONS:  All current active medications have been reviewed  PHYSICAL EXAM:  Vitals:  Temp:  [97 5 °F (36 4 °C)-98 2 °F (36 8 °C)] 98 2 °F (36 8 °C)  HR:  [63-70] 63  Resp:  [16-18] 18  BP: (126-155)/(81-87) 142/81  SpO2:  [95 %] 95 %  Temp (24hrs), Av 9 °F (36 6 °C), Min:97 5 °F (36 4 °C), Max:98 2 °F (36 8 °C)  Current: Temperature: 98 2 °F (36 8 °C)     Physical Exam:  General:  Well-nourished, well-developed, in no acute distress  Eyes:  Conjunctive clear with no hemorrhages or effusions  Oropharynx:  No ulcers, no lesions  Neck:  Supple, no lymphadenopathy  Lungs:  Normal respiratory excursion, no accessory muscle use  Cardiac:  Regular rate and rhythm, extremities well perfused  Abdomen:  Soft, non-tender, non-distended  Extremities:  No peripheral cyanosis, clubbing, or edema  Skin:  No rashes, no ulcers  Neurological:  Moves all four extremities spontaneously, sensation grossly intact    LABS, IMAGING, & OTHER STUDIES:  Lab Results:  I have personally reviewed pertinent labs    Results from last 7 days Lab Units 09/19/22  0911 09/18/22  0510 09/17/22  0614 09/15/22  0524 09/14/22  1151   POTASSIUM mmol/L 4 2 3 9 3 9   < > 3 9   CHLORIDE mmol/L 101 103 105   < > 105   CO2 mmol/L 33* 31 29   < > 28   BUN mg/dL 18 16 24   < > 29*   CREATININE mg/dL 1 05 0 98 1 05   < > 1 24   EGFR ml/min/1 73sq m 75 81 75   < > 61   CALCIUM mg/dL 8 6 8 4 8 3   < > 8 6   AST U/L  --   --   --   --  11   ALT U/L  --   --   --   --  26   ALK PHOS U/L  --   --   --   --  54    < > = values in this interval not displayed  Results from last 7 days   Lab Units 09/19/22  0603 09/17/22  0614 09/15/22  0524   WBC Thousand/uL 4 74 7 22 25 76*   HEMOGLOBIN g/dL 11 6* 13 9 13 9   PLATELETS Thousands/uL 161 191 207     Results from last 7 days   Lab Units 09/16/22  1747 09/16/22  1641 09/14/22  1058   BLOOD CULTURE  Pseudomonas aeruginosa*  Pseudomonas aeruginosa*  --   --    GRAM STAIN RESULT  Gram negative rods*  Gram negative rods*  --   --    URINE CULTURE   --  No Growth <1000 cfu/mL No Growth <1000 cfu/mL       Imaging Studies:   I have personally reviewed pertinent imaging study reports and images in PACS  MRI L-spine reviewed personally no enhancing collection    EKG, Pathology, and Other Studies:   I have personally reviewed pertinent reports

## 2022-09-19 NOTE — ASSESSMENT & PLAN NOTE
Secondary to UTI to recent urologic procedure  Await sensitivities  Continue cefepime  Consult ID-CT abdomen and pelvis pending

## 2022-09-19 NOTE — ASSESSMENT & PLAN NOTE
- presents with 5 day history of worsening low right back pain with radiation down the posterior lateral lower right extremity  - denies any saddle anesthesia or bladder/bowel dysfunction suggestive of cauda equina  - CT scan in the ER showing postsurgical changes and L4-L5 and L5-S1 bilateral foraminal narrowing    - patient with history of 3 lumbar back surgeries in the past including most recently a lumbar decompression in 2019    - multimodal pain control with IV Decadron, Tylenol ATC, Toradol p r n , Flexeril p r n , Norco p r n   - MRI lumbar spine without significant new findings continue conservative management including pain control, physical therapy  - continue serial neurologic exams  - seen by Neurosurgery,   Patient complaining of pain in the right hip and going down to the thigh-scheduled for a CT abdomen and pelvis  -follow-up for abnormality for the hip

## 2022-09-19 NOTE — PROGRESS NOTES
[address] [facility]  Progress Note - Jasmin Collins 1960, 60 y o  male MRN: [id number]  [facility] Encounter: [id number]  Primary Care Provider: ABNER Brown   Date and time admitted to hospital: 9/14/2022  9:01 AM    * Acute right-sided low back pain  Assessment & Plan  - presents with 5 day history of worsening low right back pain with radiation down the posterior lateral lower right extremity  - denies any saddle anesthesia or bladder/bowel dysfunction suggestive of cauda equina  - CT scan in the ER showing postsurgical changes and L4-L5 and L5-S1 bilateral foraminal narrowing    - patient with history of 3 lumbar back surgeries in the past including most recently a lumbar decompression in 2019    - multimodal pain control with IV Decadron, Tylenol ATC, Toradol p r n , Flexeril p r n , Norco p r n   - MRI lumbar spine without significant new findings continue conservative management including pain control, physical therapy  - continue serial neurologic exams  - seen by Neurosurgery,   Patient complaining of pain in the right hip and going down to the thigh-scheduled for a CT abdomen and pelvis  -follow-up for abnormality for the hip    Bacteremia due to Pseudomonas  Assessment & Plan  Secondary to UTI to recent urologic procedure  Await sensitivities  Continue cefepime  Consult ID-CT abdomen and pelvis pending    Sepsis ([facility] )  Assessment & Plan  Secondary to Pseudomonas bacteremia secondary to urologic procedure on 9/12  Patient stated he was prescribed prophylactic antibiotics postprocedure but did not take them  Noted fevers >102 the significant leukocytosis 22,000 on 9/16, no further fevers, leukocytosis resolved  Secondary to bacteremia    BPH  Assessment & Plan  - continue home Flomax    Essential hypertension  Assessment & Plan  Elevated suspect secondary to the pain component  Continue home amlodipine, dose increased, now well controlled      VTE Pharmacologic Prophylaxis: VTE Score: 4 Moderate Risk (Score 3-4) - Pharmacological DVT Prophylaxis Ordered: enoxaparin (Lovenox)  Patient Centered Rounds: I performed bedside rounds with nursing staff today  Discussions with Specialists or Other Care Team Provider:     Education and Discussions with Family / Patient: Updated patient  Time Spent for Care: 30 minutes  More than 50% of total time spent on counseling and coordination of care as described above  Current Length of Stay: 5 day(s)  Current Patient Status: Inpatient   Certification Statement: The patient will continue to require additional inpatient hospital stay due to IV antibiotics  Discharge Plan: Anticipate discharge in 48-72 hrs to home with home services  Code Status: Level 1 - Full Code    Subjective:   Patient seen and examined  Currently on IV antibiotics  Patient still complaining of pain reported that the pain is in the right back/hip area going down to the thigh  Yesterday patient received 4 doses of oxycodone  Reported that the pain is improving wants to know what is the reason for his pain    Objective:     Vitals:   Temp (24hrs), Av 9 °F (36 6 °C), Min:97 5 °F (36 4 °C), Max:98 2 °F (36 8 °C)    Temp:  [97 5 °F (36 4 °C)-98 2 °F (36 8 °C)] 98 2 °F (36 8 °C)  HR:  [63-70] 63  Resp:  [16-18] 18  BP: (126-155)/(81-87) 142/81  SpO2:  [95 %] 95 %  There is no height or weight on file to calculate BMI  Input and Output Summary (last 24 hours): Intake/Output Summary (Last 24 hours) at 2022 1718  Last data filed at 2022 0542  Gross per 24 hour   Intake 1250 ml   Output 1 ml   Net 1249 ml       Physical Exam:   Physical Exam  Constitutional:       General: He is not in acute distress  HENT:      Head: Normocephalic  Nose: Nose normal    Eyes:      General: No scleral icterus  Cardiovascular:      Rate and Rhythm: Normal rate and regular rhythm  Heart sounds: Normal heart sounds     Pulmonary: Comments: Decreased breath sounds bilateral  Abdominal:      General: Abdomen is flat  Musculoskeletal:         General: No tenderness  Neurological:      Mental Status: He is alert  Mental status is at baseline  Additional Data:     Labs:  Results from last 7 days   Lab Units 09/19/22  0603   WBC Thousand/uL 4 74   HEMOGLOBIN g/dL 11 6*   HEMATOCRIT % 35 7*   PLATELETS Thousands/uL 161   NEUTROS PCT % 52   LYMPHS PCT % 25   MONOS PCT % 14*   EOS PCT % 8*     Results from last 7 days   Lab Units 09/19/22  0911 09/15/22  0524 09/14/22  1151   SODIUM mmol/L 135   < > 137   POTASSIUM mmol/L 4 2   < > 3 9   CHLORIDE mmol/L 101   < > 105   CO2 mmol/L 33*   < > 28   BUN mg/dL 18   < > 29*   CREATININE mg/dL 1 05   < > 1 24   ANION GAP mmol/L 1*   < > 4   CALCIUM mg/dL 8 6   < > 8 6   ALBUMIN g/dL  --   --  3 7   TOTAL BILIRUBIN mg/dL  --   --  1 55*   ALK PHOS U/L  --   --  54   ALT U/L  --   --  26   AST U/L  --   --  11   GLUCOSE RANDOM mg/dL 109   < > 130    < > = values in this interval not displayed  Results from last 7 days   Lab Units 09/15/22  0524   INR  1 08             Results from last 7 days   Lab Units 09/17/22  0614 09/16/22  1746   LACTIC ACID mmol/L  --  1 9   PROCALCITONIN ng/ml 1 73* 0 57*       Lines/Drains:  Invasive Devices  Report    Peripheral Intravenous Line  Duration           Peripheral IV 09/17/22 Dorsal (posterior); Right Forearm 2 days                      Imaging: Personally reviewed the following imaging: MRI spine    Recent Cultures (last 7 days):   Results from last 7 days   Lab Units 09/16/22  1747 09/16/22  1641 09/14/22  1058   BLOOD CULTURE  Pseudomonas aeruginosa*  Pseudomonas aeruginosa*  --   --    GRAM STAIN RESULT  Gram negative rods*  Gram negative rods*  --   --    URINE CULTURE   --  No Growth <1000 cfu/mL No Growth <1000 cfu/mL       Last 24 Hours Medication List:   Current Facility-Administered Medications   Medication Dose Route Frequency Provider Last Rate    acetaminophen  975 mg Oral [facility] Starla Blew Starsinic, DO      amLODIPine  10 mg Oral Daily Estefani Baptiste MD      cefepime  2,000 mg Intravenous Q8H Nicole Nichols MD      enoxaparin  40 mg Subcutaneous Q24H [facility] [place], [place]      hydrocortisone   Topical 4x Daily PRN Delfina Solano MD      lidocaine  1 patch Topical Daily Starla Blew Starsinic, DO      methocarbamol  500 mg Oral Q6H PRN Starla Blew Starsinic, DO      ondansetron  4 mg Intravenous Q6H PRN Starla Blew Starsinic, DO      oxyCODONE  5 mg Oral Q4H PRN Starla Blew Starsinic, DO      tamsulosin  0 4 mg Oral Daily Starla Blew Starsinic, DO          Today, Patient Was Seen By: Delfina Solano MD    **Please Note: This note may have been constructed using a voice recognition system  **

## 2022-09-20 ENCOUNTER — APPOINTMENT (INPATIENT)
Dept: RADIOLOGY | Facility: HOSPITAL | Age: 62
DRG: 862 | End: 2022-09-20
Payer: COMMERCIAL

## 2022-09-20 VITALS
TEMPERATURE: 98.7 F | HEART RATE: 65 BPM | RESPIRATION RATE: 17 BRPM | SYSTOLIC BLOOD PRESSURE: 142 MMHG | OXYGEN SATURATION: 96 % | DIASTOLIC BLOOD PRESSURE: 81 MMHG

## 2022-09-20 LAB
ANION GAP SERPL CALCULATED.3IONS-SCNC: 5 MMOL/L (ref 4–13)
BUN SERPL-MCNC: 13 MG/DL (ref 5–25)
CALCIUM SERPL-MCNC: 8.8 MG/DL (ref 8.3–10.1)
CHLORIDE SERPL-SCNC: 110 MMOL/L (ref 96–108)
CO2 SERPL-SCNC: 24 MMOL/L (ref 21–32)
CREAT SERPL-MCNC: 0.78 MG/DL (ref 0.6–1.3)
DME PARACHUTE DELIVERY DATE ACTUAL: NORMAL
DME PARACHUTE DELIVERY DATE REQUESTED: NORMAL
DME PARACHUTE ITEM DESCRIPTION: NORMAL
DME PARACHUTE ORDER STATUS: NORMAL
DME PARACHUTE SUPPLIER NAME: NORMAL
DME PARACHUTE SUPPLIER PHONE: NORMAL
GFR SERPL CREATININE-BSD FRML MDRD: 96 ML/MIN/1.73SQ M
GLUCOSE SERPL-MCNC: 102 MG/DL (ref 65–140)
POTASSIUM SERPL-SCNC: 3.7 MMOL/L (ref 3.5–5.3)
SODIUM SERPL-SCNC: 139 MMOL/L (ref 135–147)

## 2022-09-20 PROCEDURE — 80048 BASIC METABOLIC PNL TOTAL CA: CPT | Performed by: INTERNAL MEDICINE

## 2022-09-20 PROCEDURE — 99232 SBSQ HOSP IP/OBS MODERATE 35: CPT | Performed by: INTERNAL MEDICINE

## 2022-09-20 PROCEDURE — 99239 HOSP IP/OBS DSCHRG MGMT >30: CPT | Performed by: INTERNAL MEDICINE

## 2022-09-20 PROCEDURE — G1004 CDSM NDSC: HCPCS

## 2022-09-20 PROCEDURE — 74177 CT ABD & PELVIS W/CONTRAST: CPT

## 2022-09-20 RX ORDER — TAMSULOSIN HYDROCHLORIDE 0.4 MG/1
0.4 CAPSULE ORAL DAILY
Qty: 90 CAPSULE | Refills: 0 | Status: SHIPPED | OUTPATIENT
Start: 2022-09-20

## 2022-09-20 RX ORDER — CIPROFLOXACIN 750 MG/1
750 TABLET, FILM COATED ORAL 2 TIMES DAILY
Qty: 12 TABLET | Refills: 0 | Status: SHIPPED | OUTPATIENT
Start: 2022-09-20 | End: 2022-09-26

## 2022-09-20 RX ORDER — CIPROFLOXACIN 750 MG/1
750 TABLET, FILM COATED ORAL 2 TIMES DAILY
Status: DISCONTINUED | OUTPATIENT
Start: 2022-09-20 | End: 2022-09-20 | Stop reason: HOSPADM

## 2022-09-20 RX ORDER — LIDOCAINE 50 MG/G
1 PATCH TOPICAL DAILY
Qty: 10 PATCH | Refills: 0 | Status: SHIPPED | OUTPATIENT
Start: 2022-09-21 | End: 2022-10-01

## 2022-09-20 RX ORDER — OXYCODONE HYDROCHLORIDE 5 MG/1
5 TABLET ORAL EVERY 4 HOURS PRN
Qty: 15 TABLET | Refills: 0 | Status: SHIPPED | OUTPATIENT
Start: 2022-09-20 | End: 2022-09-30

## 2022-09-20 RX ORDER — METHOCARBAMOL 500 MG/1
500 TABLET, FILM COATED ORAL EVERY 6 HOURS PRN
Qty: 20 TABLET | Refills: 0 | Status: SHIPPED | OUTPATIENT
Start: 2022-09-20 | End: 2022-09-30 | Stop reason: SDUPTHER

## 2022-09-20 RX ORDER — AMLODIPINE BESYLATE 10 MG/1
10 TABLET ORAL DAILY
Qty: 30 TABLET | Refills: 0 | Status: SHIPPED | OUTPATIENT
Start: 2022-09-21

## 2022-09-20 RX ADMIN — OXYCODONE HYDROCHLORIDE 5 MG: 5 TABLET ORAL at 02:16

## 2022-09-20 RX ADMIN — CIPROFLOXACIN 750 MG: 750 TABLET, FILM COATED ORAL at 17:23

## 2022-09-20 RX ADMIN — ACETAMINOPHEN 975 MG: 325 TABLET, FILM COATED ORAL at 05:25

## 2022-09-20 RX ADMIN — LIDOCAINE 5% 1 PATCH: 700 PATCH TOPICAL at 10:51

## 2022-09-20 RX ADMIN — CEFEPIME 2000 MG: 2 INJECTION, POWDER, FOR SOLUTION INTRAVENOUS at 09:41

## 2022-09-20 RX ADMIN — CEFEPIME 2000 MG: 2 INJECTION, POWDER, FOR SOLUTION INTRAVENOUS at 00:41

## 2022-09-20 RX ADMIN — ENOXAPARIN SODIUM 40 MG: 40 INJECTION SUBCUTANEOUS at 10:51

## 2022-09-20 RX ADMIN — IOHEXOL 100 ML: 350 INJECTION, SOLUTION INTRAVENOUS at 03:10

## 2022-09-20 RX ADMIN — OXYCODONE HYDROCHLORIDE 5 MG: 5 TABLET ORAL at 13:57

## 2022-09-20 RX ADMIN — ONDANSETRON 4 MG: 2 INJECTION INTRAMUSCULAR; INTRAVENOUS at 18:14

## 2022-09-20 RX ADMIN — METHOCARBAMOL 500 MG: 500 TABLET, FILM COATED ORAL at 10:52

## 2022-09-20 RX ADMIN — METHOCARBAMOL 500 MG: 500 TABLET, FILM COATED ORAL at 03:28

## 2022-09-20 RX ADMIN — OXYCODONE HYDROCHLORIDE 5 MG: 5 TABLET ORAL at 09:17

## 2022-09-20 RX ADMIN — OXYCODONE HYDROCHLORIDE 5 MG: 5 TABLET ORAL at 18:21

## 2022-09-20 RX ADMIN — HYDROCORTISONE: 1 OINTMENT TOPICAL at 13:59

## 2022-09-20 RX ADMIN — AMLODIPINE BESYLATE 10 MG: 5 TABLET ORAL at 10:51

## 2022-09-20 RX ADMIN — ACETAMINOPHEN 975 MG: 325 TABLET, FILM COATED ORAL at 13:57

## 2022-09-20 NOTE — ASSESSMENT & PLAN NOTE
- presented with 5 day history of worsening low right back pain with radiation down the posterior lateral lower right extremity  - denies any saddle anesthesia or bladder/bowel dysfunction suggestive of cauda equina  - CT scan in the ER showing postsurgical changes and L4-L5 and L5-S1 bilateral foraminal narrowing    - patient with history of 3 lumbar back surgeries in the past including most recently a lumbar decompression in 2019    - multimodal pain control with IV Decadron, Tylenol ATC, Toradol p r n , Flexeril p r n , Norco p r n   - MRI lumbar spine without significant new findings , seen by NeuroSx who knows him from out pt- recommeded to continue conservative management including pain control, physical therapy and follow up in office  Patient complaining of pain in the right hip and going down to the thigh-scheduled for a CT abdomen and pelvis  -follow-up for abnormality for the hip- results unremarkable  -PT/OT eval done- out pt rehab    Patient states he will go home with outpt PT but not to in pt rehab

## 2022-09-20 NOTE — PROGRESS NOTES
Progress Note - Infectious Disease   Anita Nuñez 60 y o  male MRN: [id number]  Unit/Bed#: [facility] 322-01 Encounter: [id number]      Impression/Recommendations:  1  Sepsis  Evolving : Fever, tachycardia  Due to # 2  No other appreciable source  Initially presented with right-sided sciatica although CT/MRI of spine negative for acute infectious process  Clinically stable and nontoxic  Improved  Rec:  · Continue antibiotics as below  · Follow temperatures closely  · Supportive care as per the primary service     2  Pseudomonas bacteremia  Consider  source given recent cystoscopy, pyuria seen on UA although urine culture collected prior antibiotics negative  No other appreciable source  Abdominal exam benign  LFTs, CT A/P normal    Rec:  · Change antibiotics to Cipro to continue through  to complete 10 days total antibiotics     3  Acute on chronic right sciatica  Consider exacerbation by recent golf activity  Unclear relation to above  CT/MRI L-spine negative  Rec:  · Analgesia per primary service  · Close neurosurgery follow-up ongoing     4  Bladder cancer  Undergoes routine surveillance cystoscopy  Possible risk factor for infection as above      The above impression and plan was discussed in detail with the patient and Dr Jeff Moreno  The patient is stable from an ID standpoint for D/C home  We will sign off for now  Please call with new questions      Antibiotics:  Cefepime #4    Subjective:  Patient seen on AM rounds  Denies fevers, chills, sweats, nausea, vomiting, or diarrhea  Will has right hip and leg pain, controlled with oxycodome and Robaxin  Minor urinary hesitancy but no dysuria  24 Hour Events:  No documented fevers, chills, sweats, nausea, vomiting, or diarrhea      Objective:  Vitals:  Temp:  [98 1 °F (36 7 °C)-98 4 °F (36 9 °C)] 98 4 °F (36 9 °C)  HR:  [60-71] 71  Resp:  [16-18] 18  BP: (141-159)/(86-90) 141/86  SpO2:  [97 %-98 %] 97 %  Temp (24hrs), Av 3 °F (36 8 °C), Min:98 1 °F (36 7 °C), Max:98 4 °F (36 9 °C)  Current: Temperature: 98 4 °F (36 9 °C)    Physical Exam:   General:  No acute distress  Psychiatric:  Awake and alert  Pulmonary:  Normal respiratory excursion without accessory muscle use  Abdomen:  Soft, nontender  Extremities:  No edema  Skin:  No rashes    Lab Results:  I have personally reviewed pertinent labs  Results from last 7 days   Lab Units 09/20/22  0325 09/19/22  0911 09/18/22  0510 09/15/22  0524 09/14/22  1151   POTASSIUM mmol/L 3 7 4 2 3 9   < > 3 9   CHLORIDE mmol/L 110* 101 103   < > 105   CO2 mmol/L 24 33* 31   < > 28   BUN mg/dL 13 18 16   < > 29*   CREATININE mg/dL 0 78 1 05 0 98   < > 1 24   EGFR ml/min/1 73sq m 96 75 81   < > 61   CALCIUM mg/dL 8 8 8 6 8 4   < > 8 6   AST U/L  --   --   --   --  11   ALT U/L  --   --   --   --  26   ALK PHOS U/L  --   --   --   --  54    < > = values in this interval not displayed  Results from last 7 days   Lab Units 09/19/22  0603 09/17/22  0614 09/15/22  0524   WBC Thousand/uL 4 74 7 22 25 76*   HEMOGLOBIN g/dL 11 6* 13 9 13 9   PLATELETS Thousands/uL 161 191 207     Results from last 7 days   Lab Units 09/16/22  1747 09/16/22  1641 09/14/22  1058   BLOOD CULTURE  Pseudomonas aeruginosa*  Pseudomonas aeruginosa*  --   --    GRAM STAIN RESULT  Gram negative rods*  Gram negative rods*  --   --    URINE CULTURE   --  No Growth <1000 cfu/mL No Growth <1000 cfu/mL       Imaging Studies:   I have personally reviewed pertinent imaging study reports and images in PACS  EKG, Pathology, and Other Studies:   I have personally reviewed pertinent reports

## 2022-09-20 NOTE — ASSESSMENT & PLAN NOTE
Secondary to UTI to recent urologic procedure  Continued cefepime D4- ID followed up- changed to po Cipro till 9/26 to complete 10days   CT A/P- unremarkable

## 2022-09-20 NOTE — ASSESSMENT & PLAN NOTE
Secondary to Pseudomonas bacteremia secondary to urologic procedure on 9/12  Patient stated he was prescribed prophylactic antibiotics postprocedure but did not take them  Noted fevers >102 the significant leukocytosis 22,000 on 9/16, no further fevers, leukocytosis resolved  Secondary to bacteremia  S/P cefepime D4- ID apprecaited- changed to po cipro till 9/26

## 2022-09-20 NOTE — PLAN OF CARE
Problem: PAIN - ADULT  Goal: Verbalizes/displays adequate comfort level or baseline comfort level  Description: Interventions:  - Encourage patient to monitor pain and request assistance  - Assess pain using appropriate pain scale  - Administer analgesics based on type and severity of pain and evaluate response  - Implement non-pharmacological measures as appropriate and evaluate response  - Consider cultural and social influences on pain and pain management  - Notify physician/advanced practitioner if interventions unsuccessful or patient reports new pain  Outcome: Progressing     Problem: INFECTION - ADULT  Goal: Absence or prevention of progression during hospitalization  Description: INTERVENTIONS:  - Assess and monitor for signs and symptoms of infection  - Monitor lab/diagnostic results  - Monitor all insertion sites, i e  indwelling lines, tubes, and drains  - Monitor endotracheal if appropriate and nasal secretions for changes in amount and color  - [place] appropriate cooling/warming therapies per order  - Administer medications as ordered  - Instruct and encourage patient and family to use good hand hygiene technique  - Identify and instruct in appropriate isolation precautions for identified infection/condition  Outcome: Progressing  Goal: Absence of fever/infection during neutropenic period  Description: INTERVENTIONS:  - Monitor WBC    Outcome: Progressing     Problem: SAFETY ADULT  Goal: Patient will remain free of falls  Description: INTERVENTIONS:  - Educate patient/family on patient safety including physical limitations  - Instruct patient to call for assistance with activity   - Consult OT/PT to assist with strengthening/mobility   - Keep Call bell within reach  - Keep bed low and locked with side rails adjusted as appropriate  - Keep care items and personal belongings within reach  - Initiate and maintain comfort rounds  - Make Fall Risk Sign visible to staff  - Offer Toileting everyHours, in advance of need  - Initiate/Maintainlarm  - Obtain necessary fall risk management equipment:  - Apply yellow socks and bracelet for high fall risk patients  - Consider moving patient to room near nurses station  Outcome: Progressing  Goal: Maintain or return to baseline ADL function  Description: INTERVENTIONS:  -  Assess patient's ability to carry out ADLs; assess patient's baseline for ADL function and identify physical deficits which impact ability to perform ADLs (bathing, care of mouth/teeth, toileting, grooming, dressing, etc )  - Assess/evaluate cause of self-care deficits   - Assess range of motion  - Assess patient's mobility; develop plan if impaired  - Assess patient's need for assistive devices and provide as appropriate  - Encourage maximum independence but intervene and supervise when necessary  - Involve family in performance of ADLs  - Assess for home care needs following discharge   - Consider OT consult to assist with ADL evaluation and planning for discharge  - Provide patient education as appropriate  Outcome: Progressing  Goal: Maintains/Returns to pre admission functional level  Description: INTERVENTIONS:  - Perform BMAT or MOVE assessment daily    - Set and communicate daily mobility goal to care team and patient/family/caregiver     - Collaborate with rehabilitation services on mobility goals if consulted  - Perform Range of Mot  - Out of bed for toileting  - Record patient progress and toleration of activity level   Outcome: Progressing     Problem: DISCHARGE PLANNING  Goal: Discharge to home or other facility with appropriate resources  Description: INTERVENTIONS:  - Identify barriers to discharge w/patient and caregiver  - Arrange for needed discharge resources and transportation as appropriate  - Identify discharge learning needs (meds, wound care, etc )  - Arrange for interpretive services to assist at discharge as needed  - Refer to Case Management Department for coordinating discharge planning if the patient needs post-hospital services based on physician/advanced practitioner order or complex needs related to functional status, cognitive ability, or social support system  Outcome: Progressing     Problem: Knowledge Deficit  Goal: Patient/family/caregiver demonstrates understanding of disease process, treatment plan, medications, and discharge instructions  Description: Complete learning assessment and assess knowledge base  Interventions:  - Provide teaching at level of understanding  - Provide teaching via preferred learning methods  Outcome: Progressing     Problem: MOBILITY - ADULT  Goal: Maintain or return to baseline ADL function  Description: INTERVENTIONS:  -  Assess patient's ability to carry out ADLs; assess patient's baseline for ADL function and identify physical deficits which impact ability to perform ADLs (bathing, care of mouth/teeth, toileting, grooming, dressing, etc )  - Assess/evaluate cause of self-care deficits   - Assess range of motion  - Assess patient's mobility; develop plan if impaired  - Assess patient's need for assistive devices and provide as appropriate  - Encourage maximum independence but intervene and supervise when necessary  - Involve family in performance of ADLs  - Assess for home care needs following discharge   - Consider OT consult to assist with ADL evaluation and planning for discharge  - Provide patient education as appropriate  Outcome: Progressing  Goal: Maintains/Returns to pre admission functional level  Description: INTERVENTIONS:  - Perform BMAT or MOVE assessment daily    - Set and communicate daily mobility goal to care team and patient/family/caregiver     - Collaborate with rehabilitation services on mobility goals if consulted  - Perform Ran  - Out of bed for toileting  - Record patient progress and toleration of activity level   Outcome: Progressing     Problem: Potential for Falls  Goal: Patient will remain free of falls  Description: INTERVENTIONS:  - Educate patient/family on patient safety including physical limitations  - Instruct patient to call for assistance with activity   - Consult OT/PT to assist with strengthening/mobility   - Keep Call bell within reach  - Keep bed low and locked with side rails adjusted as appropriate  - Keep care items and personal belongings within reach  - Initiate and maintain comfort rounds  - Make Fall Risk Sign visible to staff  - Offer Toileting everyHours, in advance of need  - Initiate/Maintainalarm  - Obtain necessary fall risk management equipment:  - Apply yellow socks and bracelet for high fall risk patients  - Consider moving patient to room near nurses station  Outcome: Progressing

## 2022-09-20 NOTE — PLAN OF CARE
Problem: PAIN - ADULT  Goal: Verbalizes/displays adequate comfort level or baseline comfort level  Description: Interventions:  - Encourage patient to monitor pain and request assistance  - Assess pain using appropriate pain scale  - Administer analgesics based on type and severity of pain and evaluate response  - Implement non-pharmacological measures as appropriate and evaluate response  - Consider cultural and social influences on pain and pain management  - Notify physician/advanced practitioner if interventions unsuccessful or patient reports new pain  Outcome: Progressing     Problem: INFECTION - ADULT  Goal: Absence or prevention of progression during hospitalization  Description: INTERVENTIONS:  - Assess and monitor for signs and symptoms of infection  - Monitor lab/diagnostic results  - Monitor all insertion sites, i e  indwelling lines, tubes, and drains  - Monitor endotracheal if appropriate and nasal secretions for changes in amount and color  - [place] appropriate cooling/warming therapies per order  - Administer medications as ordered  - Instruct and encourage patient and family to use good hand hygiene technique  - Identify and instruct in appropriate isolation precautions for identified infection/condition  Outcome: Progressing  Goal: Absence of fever/infection during neutropenic period  Description: INTERVENTIONS:  - Monitor WBC    Outcome: Progressing     Problem: SAFETY ADULT  Goal: Patient will remain free of falls  Description: INTERVENTIONS:  - Educate patient/family on patient safety including physical limitations  - Instruct patient to call for assistance with activity   - Consult OT/PT to assist with strengthening/mobility   - Keep Call bell within reach  - Keep bed low and locked with side rails adjusted as appropriate  - Keep care items and personal belongings within reach  - Initiate and maintain comfort rounds  - Make Fall Risk Sign visible to staff  - Offer Toileting every  Hours, in advance of need  - Initiate/Maintain     alarm  - Obtain necessary fall risk management equipment:     - Apply yellow socks and bracelet for high fall risk patients  - Consider moving patient to room near nurses station  Outcome: Progressing  Goal: Maintain or return to baseline ADL function  Description: INTERVENTIONS:  -  Assess patient's ability to carry out ADLs; assess patient's baseline for ADL function and identify physical deficits which impact ability to perform ADLs (bathing, care of mouth/teeth, toileting, grooming, dressing, etc )  - Assess/evaluate cause of self-care deficits   - Assess range of motion  - Assess patient's mobility; develop plan if impaired  - Assess patient's need for assistive devices and provide as appropriate  - Encourage maximum independence but intervene and supervise when necessary  - Involve family in performance of ADLs  - Assess for home care needs following discharge   - Consider OT consult to assist with ADL evaluation and planning for discharge  - Provide patient education as appropriate  Outcome: Progressing  Goal: Maintains/Returns to pre admission functional level  Description: INTERVENTIONS:  - Perform BMAT or MOVE assessment daily    - Set and communicate daily mobility goal to care team and patient/family/caregiver  - Collaborate with rehabilitation services on mobility goals if consulted  - Perform Range of Motion    times a day  - Reposition patient every    hours    - Dangle patient      times a day  - Stand patient      times a day  - Ambulate patient      times a day  - Out of bed to chair    times a day   - Out of bed for meals      times a day  - Out of bed for toileting  - Record patient progress and toleration of activity level   Outcome: Progressing     Problem: DISCHARGE PLANNING  Goal: Discharge to home or other facility with appropriate resources  Description: INTERVENTIONS:  - Identify barriers to discharge w/patient and caregiver  - Arrange for needed discharge resources and transportation as appropriate  - Identify discharge learning needs (meds, wound care, etc )  - Arrange for interpretive services to assist at discharge as needed  - Refer to Case Management Department for coordinating discharge planning if the patient needs post-hospital services based on physician/advanced practitioner order or complex needs related to functional status, cognitive ability, or social support system  Outcome: Progressing     Problem: Knowledge Deficit  Goal: Patient/family/caregiver demonstrates understanding of disease process, treatment plan, medications, and discharge instructions  Description: Complete learning assessment and assess knowledge base  Interventions:  - Provide teaching at level of understanding  - Provide teaching via preferred learning methods  Outcome: Progressing     Problem: MOBILITY - ADULT  Goal: Maintain or return to baseline ADL function  Description: INTERVENTIONS:  -  Assess patient's ability to carry out ADLs; assess patient's baseline for ADL function and identify physical deficits which impact ability to perform ADLs (bathing, care of mouth/teeth, toileting, grooming, dressing, etc )  - Assess/evaluate cause of self-care deficits   - Assess range of motion  - Assess patient's mobility; develop plan if impaired  - Assess patient's need for assistive devices and provide as appropriate  - Encourage maximum independence but intervene and supervise when necessary  - Involve family in performance of ADLs  - Assess for home care needs following discharge   - Consider OT consult to assist with ADL evaluation and planning for discharge  - Provide patient education as appropriate  Outcome: Progressing  Goal: Maintains/Returns to pre admission functional level  Description: INTERVENTIONS:  - Perform BMAT or MOVE assessment daily    - Set and communicate daily mobility goal to care team and patient/family/caregiver     - Collaborate with rehabilitation services on mobility goals if consulted  - Perform Range of Motion    times a day  - Reposition patient every    hours    - Dangle patient      times a day  - Stand patient    times a day  - Ambulate patient    times a day  - Out of bed to chair    times a day   - Out of bed for meals    times a day  - Out of bed for toileting  - Record patient progress and toleration of activity level   Outcome: Progressing     Problem: Potential for Falls  Goal: Patient will remain free of falls  Description: INTERVENTIONS:  - Educate patient/family on patient safety including physical limitations  - Instruct patient to call for assistance with activity   - Consult OT/PT to assist with strengthening/mobility   - Keep Call bell within reach  - Keep bed low and locked with side rails adjusted as appropriate  - Keep care items and personal belongings within reach  - Initiate and maintain comfort rounds  - Make Fall Risk Sign visible to staff  - Offer Toileting every      Hours, in advance of need  - Initiate/Maintain     alarm  - Obtain necessary fall risk management equipment:     - Apply yellow socks and bracelet for high fall risk patients  - Consider moving patient to room near nurses station  Outcome: Progressing

## 2022-09-20 NOTE — DISCHARGE SUMMARY
[facility]  Discharge- Malcom Krabbe 1960, 60 y o  male MRN: [id number]  Unit/Bed#: [address] 322-01 Encounter: [id number]  Primary Care Provider: ABNER Emery Res   Date and time admitted to hospital: 9/14/2022  9:01 AM    * Acute right-sided low back pain  Assessment & Plan  - presented with 5 day history of worsening low right back pain with radiation down the posterior lateral lower right extremity  - denies any saddle anesthesia or bladder/bowel dysfunction suggestive of cauda equina  - CT scan in the ER showing postsurgical changes and L4-L5 and L5-S1 bilateral foraminal narrowing    - patient with history of 3 lumbar back surgeries in the past including most recently a lumbar decompression in 2019    - multimodal pain control with IV Decadron, Tylenol ATC, Toradol p r n , Flexeril p r n , Norco p r n   - MRI lumbar spine without significant new findings , seen by NeuroSx who knows him from out pt- recommeded to continue conservative management including pain control, physical therapy and follow up in office  Patient complaining of pain in the right hip and going down to the thigh-scheduled for a CT abdomen and pelvis  -follow-up for abnormality for the hip- results unremarkable  -PT/OT eval done- out pt rehab  Patient states he will go home with outpt PT but not to in pt rehab    Bacteremia due to Pseudomonas  Assessment & Plan  Secondary to UTI to recent urologic procedure  Continued cefepime D4- ID followed up- changed to po Cipro till 9/26 to complete 10days   CT A/P- unremarkable      Sepsis [facility])  Assessment & Plan  Secondary to Pseudomonas bacteremia secondary to urologic procedure on 9/12  Patient stated he was prescribed prophylactic antibiotics postprocedure but did not take them  Noted fevers >102 the significant leukocytosis 22,000 on 9/16, no further fevers, leukocytosis resolved  Secondary to bacteremia  S/P cefepime D4- ID apprecaited- changed to po cipro till 9/26    BPH  Assessment & Plan  - continue home Flomax    Essential hypertension  Assessment & Plan  Elevated suspect secondary to the pain component  Continue home amlodipine, dose increased, now well controlled              Medical Problems             Resolved Problems  Date Reviewed: 9/20/2022   None                 Admission Date:   Admission Orders (From admission, onward)     Ordered        09/14/22 1153  Inpatient Admission  Once                        Admitting Diagnosis: Back pain [M54 9]  Lower back pain [M54 50]    HPI: Tom Tubbs is a 60 y o  male with a PMH of lumbar disc disease status post lumbar surgery x3, hypertension, bladder cancer, BPH, who presents to the [facility] emergency department with acute low back pain  Patient had been doing well following his previous surgery in 2019 until late last week  He was out golfing and subsequently developed right-sided low back pain  He does not remember any traumatic injury or motion that triggered the symptoms  He saw his PCP on Monday and had no relief with prescribed Flexeril and Medrol Dosepak  He initially had plans to see his neurosurgeon in the office, however his pain continued prompting the ER visit  He rates the pain 9/10 with no remitting factors  Seems to be worse with ambulation, though he states that it is pretty constant all the time  In the emergency room initial workup was unrevealing  CT scan does show some L4-L5 and L5-S1 disease but no emergent findings  Case was discussed with Neurosurgery who recommended MRI for further evaluation  Given his ambulatory dysfunction, ongoing pain issues, and need for further imaging he will be admitted to the hospitalist service for further evaluation and management  Procedures Performed: No orders of the defined types were placed in this encounter        Summary of Hospital Course: see above    Significant Findings, Care, Treatment and Services Provided: neurosx, ID    Complications: none    Condition at Discharge: stable         Discharge instructions/Information to patient and family:   See after visit summary for information provided to patient and family  Provisions for Follow-Up Care:  See after visit summary for information related to follow-up care and any pertinent home health orders  PCP: ABNER Salgado    Disposition: See After Visit Summary for discharge disposition information  Planned Readmission: No    Discharge Statement   I spent 35 minutes discharging the patient  This time was spent on the day of discharge  I had direct contact with the patient on the day of discharge  Additional documentation is required if more than 30 minutes were spent on discharge  Discharge Medications:  See after visit summary for reconciled discharge medications provided to patient and family

## 2022-09-20 NOTE — TELEPHONE ENCOUNTER
9/22/22- PT DISCHARGED HOME  CONFIRMED 12/23/22 F/U APT   [facility] STAY/NEXT STEPS AND SOME DISCOMFORT   TRANSFERRED HIM TO NURSE GITA    9/20/22- [facility]

## 2022-09-21 ENCOUNTER — TRANSITIONAL CARE MANAGEMENT (OUTPATIENT)
Dept: FAMILY MEDICINE CLINIC | Facility: CLINIC | Age: 62
End: 2022-09-21

## 2022-09-21 NOTE — UTILIZATION REVIEW
Notification of Discharge   This is a Notification of Discharge from our facility [address]  Please be advised that this patient has been discharge from our facility  Below you will find the admission and discharge date and time including the patients disposition  UTILIZATION REVIEW CONTACT:  Aure Christopher  Utilization   Network Utilization Review Department  Phone: [phone] x carefully listen to the prompts  All voicemails are confidential   Email: [email or web address]     PHYSICIAN ADVISORY SERVICES:  FOR [facility] REVIEW - MEDICAL NECESSITY DENIAL  Phone: [phone]  Fax: [phone]  Email: [email or web address]     PRESENTATION DATE: 9/14/2022  9:01 AM  OBERVATION ADMISSION DATE:   INPATIENT ADMISSION DATE: 9/14/22 11:53 AM   DISCHARGE DATE: 9/20/2022  7:49 PM  DISPOSITION: Home/Self Care Home/Self Care      IMPORTANT INFORMATION:  Send all requests for admission clinical reviews, approved or denied determinations and any other requests to dedicated fax number below belonging to the campus where the patient is receiving treatment   List of dedicated fax numbers:  [address] DENIALS (Administrative/Medical Necessity) [phone]   1000  [facility] (Maternity/NICU/Pediatrics) [phone]   [facility] [phone]   130 [facility] [phone]   [address] [place] [address] [phone]   2000 [facility] [address] [place] [address],[address] [place] [address] [phone]   [facility]  [phone]   2205 [facility] [address] [facility]  2401 [facility] 1000 [address] [facility] [phone]

## 2022-09-23 ENCOUNTER — TELEPHONE (OUTPATIENT)
Dept: NEUROSURGERY | Facility: CLINIC | Age: 62
End: 2022-09-23

## 2022-09-23 DIAGNOSIS — M21.372 LEFT FOOT DROP: ICD-10-CM

## 2022-09-23 DIAGNOSIS — M54.16 LUMBAR RADICULOPATHY: Primary | ICD-10-CM

## 2022-09-23 DIAGNOSIS — M54.50 ACUTE RIGHT-SIDED LOW BACK PAIN: ICD-10-CM

## 2022-09-23 NOTE — TELEPHONE ENCOUNTER
Received a call from patient requesting a call back regarding his plan of care  Returned call to patient and advised per the hospital note from Dr La Cagle patient should schedule with both PT and PM for conservative measures at this time and then f/u in the outpatient setting in 3 months  Advised patient this RN will place a PM referral since there is not one in the system  Patient stated an understanding  Patent did report that he has an appt with PT next week  Patient stated he is currently taking his medications however will be running low on his oxycodone soon  Informed him he should contact his PCP or PM as they will be the ones to manage his medications  Patient was appreciative of the call back

## 2022-09-26 ENCOUNTER — TELEPHONE (OUTPATIENT)
Dept: FAMILY MEDICINE CLINIC | Facility: CLINIC | Age: 62
End: 2022-09-26

## 2022-09-26 NOTE — TELEPHONE ENCOUNTER
Received a call from Lance Moore indicating that he contacted PM and they discussed with him possible treatment options including KYLE  He was not sure how this would be helpful and that he only has 2 oxycodone left and is not sure what to do after he runs out  Explained that conservative treatment is needed prior to any surgical intervention being offered  Suggested he follow through with PM and undergo KYLE  He was agreeable   Replaced referral

## 2022-09-26 NOTE — TELEPHONE ENCOUNTER
Pt was given Methocarbamol and Oxycodone  You would want him to make a Office visit if he is requesting refills correct?

## 2022-09-26 NOTE — TELEPHONE ENCOUNTER
[facility] 75  coding opportunities          Chart reviewed, no opportunity found: CHART REVIEWED, NO OPPORTUNITY FOUND              Patients insurance company: [employer] (Medicare Advantage and Commercial) Patient called stated he is running low on medication that was prescribed from hospital he went in for back pain, I did offer patient an apt to come in and f/ up with provider but he just wanted a message sent back for provider to fill medications that was prescribed

## 2022-09-27 NOTE — TELEPHONE ENCOUNTER
Patient is scheduled with pain management in 2 days  The best plan of care would be through them to do the [facility] as discussed with neurosurgery  Continuation of opioid therapy is not indicated at this time   I would be glad to refill the methocarbamol however

## 2022-09-28 NOTE — PROGRESS NOTES
Portions of the record may have been created with voice recognition software  Occasional wrong word or "sound a like" substitutions may have occurred due to the inherent limitations of voice recognition software  Read the chart carefully and recognize, using context, where substitutions have occurred  Assessment  1  Chronic right-sided low back pain with right-sided sciatica    2  Lumbar radiculopathy    3  Acute right-sided low back pain    4  Post laminectomy syndrome        Plan  No orders of the defined types were placed in this encounter  New Medications Ordered This Visit   Medications    gabapentin (NEURONTIN) 300 mg capsule     Sig: Take 1 capsule (300 mg total) by mouth daily at bedtime     Dispense:  30 capsule     Refill:  1     This is a 69-year-old male with past surgical history of laminectomy at L2-L3, L3-L4 and L4-L5 who presents with worsening right-sided low back and right leg pain  The pain starts the right lower back and radiates to the anterior right lower leg  On physical exam, motor exam intact with chronic bilateral footdrop and decreased ankle dorsiflexion  Negative straight leg test bilaterally  Lumbar MRI imaging results noted below the showing mild-to-moderate neural foraminal narrowing throughout the lumbar spine with no severe canal stenosis noted  The etiology patient's pain presentation is likely multifactorial in the setting of post laminectomy syndrome with elements of neural foraminal narrowing but also likely contributory of myofascial pain and chronic pain syndrome     - dependent 300 mg nightly to help with the neuropathic component of the pain      - will plan for L5-S1 epidural steroid injection is setting of prior laminectomies at the higher lumbar region     - I had a detailed discussion with the patient regarding management of chronic low back pain and the importance to continue physical therapy and conservative management with adjuvant agents as tolerated  - patient noted taking oxycodone which provides relief  I explained to him that opioids are not indicated for this pain presentation and have serious adverse affects that limits the use longer-term  My impressions and treatment recommendations were discussed in detail with the patient who verbalized understanding and had no further questions  Discharge instructions were provided  I personally saw and examined the patient and I agree with the above discussed plan of care  History of Present Illness    Niko Vilchis is a 60 y o  male with past medical history of possible multilevel laminectomy diskectomy and foraminotomy at L2-L3, L3-L4 and L4-L5 as per recent lumbar imaging, bladder cancer, hypertension  Pt is referred to [facility] by Hasmukh Kumar PA-C  for further management of his chronic low back pain and pain syndrome  Today patient reports that over the past month he has had worsening severe 10/10 constant right-sided low back pain that radiates to the right lower extremity from the posterior lateral right thigh to anterior lower leg terminating at the ankle  He describes the pain as throbbing and states he has subjective weakness in the lower extremity  Patient has bilateral footdrop at baseline from prior surgeries  The pain is increased with standing  Patient reports no new onset numbness or motor weakness  In terms of management, patient is currently taking oxycodone, Celebrex, ibuprofen which is providing relief  He denies recent physical therapy secondary to pain  In terms of imaging, patient underwent a CT lumbar spine on 09/14/2022 and a lumbar MRI on 09/15/2022  Results showing mild to moderate foraminal narrowing throughout the lumbar spine but no significant canal stenosis    Prior right laminectomy at L2-L3 and L3-L4 noted prior laminectomy at left L4-L5 noted degenerative changes throughout the lumbar spine    Patient denies any bowel or bladder incontinence or saddle anesthesia at this time  No recent fevers chills or weight changes  Lab Results   Component Value Date    CREATININE 0 78 09/20/2022     Lab Results   Component Value Date    ALT 26 09/14/2022    ALT 33 08/26/2015         Imaging:  MRI LUMBAR SPINE WITH AND WITHOUT CONTRAST   9-     INDICATION: Intractable low back pain, history of previous laminectomies and lumbar decompression      COMPARISON:  10/9/2019     TECHNIQUE:  Sagittal T1, sagittal T2, sagittal inversion recovery, axial T1 and axial T2, coronal T2  Sagittal and axial T1 postcontrast      IV Contrast:  7 mL of Gadobutrol injection (SINGLE-DOSE)      IMAGE QUALITY:  Diagnostic     FINDINGS:     VERTEBRAL BODIES:  There are 5 lumbar type vertebral bodies  Slight dextroscoliosis of the lumbar spine  There is grade 1 anterior spondylolisthesis of L5 in relation to L4 and S1  Chronic bilateral L5 spondylolysis  Type II fatty endplate marrow   degenerative change is seen within the L3 superior endplate, as well as the L4 and L5 endplates adjacent to the L4-5 disc space      SACRUM:  Normal signal within the sacrum  No evidence of insufficiency or stress fracture      DISTAL CORD AND CONUS:  Normal size and signal within the distal cord and conus      PARASPINAL SOFT TISSUES:  Paraspinal soft tissues are unremarkable      LOWER THORACIC DISC SPACES:  Normal disc height and signal   No disc herniation, canal stenosis or foraminal narrowing      LUMBAR DISC SPACES:     L1-L2:  Normal ----     L2-L3:  Mild diffuse annular bulging small left foraminal disc protrusion  Prior right laminectomy with removal of the previously seen inferiorly extruded right-sided disc herniation  Mild facet arthropathy  Improved canal stenosis, only mild at this   time and mild foraminal narrowing      L3-L4:  Slight loss of disc height with mild diffuse annular bulging    There is a lobulated bilateral paramedian disc herniation  To the right of midline this is inferiorly extruded behind the superior endplate of L4  The patient has undergone a   previous right laminectomy  Mild residual canal stenosis with slight distortion of the anterolateral aspect of the thecal sac  Mild right greater than left foraminal narrowing      L4-L5:  Disc desiccation and loss of disc height  Mild diffuse annular bulging  Mild endplate hypertrophic change within the neural foramen  The patient has undergone a previous left laminectomy  No canal stenosis  Mild bilateral foraminal   narrowing, unchanged      L5-S1:  Grade 1 anterior spondylolisthesis with chronic L5 spondylolysis  Annular bulging with uncovering of the cephalad disc margin and mild facet degenerative change  There is no canal stenosis  Mild foraminal narrowing  No change      POSTCONTRAST IMAGING:  No abnormal enhancement      IMPRESSION:     Postoperative change at L2-3 and L3-4 with prior laminectomies, new since prior examination  Improvement in the previously seen right-sided disc extrusion at L2-3  At L3-4 there is slight distortion of the right anterolateral aspect of the thecal sac   due to a new small right paramedian disc extrusion      Stable postoperative change at L4-5 without canal stenosis  Stable mild foraminal narrowing      Stable chronic L5 spondylolysis and spondylolisthesis with mild bilateral foraminal narrowing  CT LUMBAR SPINE   9-     INDICATION:   Low back pain, cancer suspected  Low back pain, increased fracture risk  Low back pain, prior surgery, new symptoms  Right lower back pain, history of bladder history      COMPARISON: None      TECHNIQUE:  Contiguous axial images through the lumbar spine were obtained  Sagittal and coronal reconstructions were performed        Radiation dose length product (DLP) for this visit:  489 59 mGy-cm     This examination, like all CT scans performed in the [facility], was performed utilizing techniques to minimize radiation dose exposure, including the use of iterative   reconstruction and automated exposure control        IMAGE QUALITY:  Diagnostic      FINDINGS:     ALIGNMENT:  There are 5 lumbar type vertebral bodies  Grade 1 anterior spondylolisthesis of L5 upon S1 with chronic bilateral spondylolysis  No scoliosis      VERTEBRAL BODIES:  Mild osteopenia  No fracture  No lytic or destructive lesion  Mild anterior lateral endplate osteophyte formation within the visualized vertebral bodies  Posterior osteophytes noted at L3-4, L4-5      DEGENERATIVE CHANGES:     Lower Thoracic spine:  Minor facet degenerative change at T11-12  No disc herniation, canal stenosis or foraminal narrowing at the T11-12 or T12-L1 levels      L1-2:  Mild annular bulging  No disc herniation, canal stenosis or foraminal narrowing      L2-3:  Moderate annular bulging diffusely  Patient has undergone a previous right laminectomy with partial resection of the superior articular facet of L3  Mild to moderate canal stenosis with slight distortion of the thecal sac  Mild foraminal   narrowing      L3-4:  Slight loss of disc height  Annular bulging  As described above there is a small central and left paramedian endplate osteophyte arising from the L3 inferior endplate  The patient has undergone previous right laminectomy  Only mild canal stenosis   is present with mild foraminal narrowing      L4-5:  Loss of disc height  Annular bulging with circumferential endplate hypertrophic osteophyte formation extending into the neural foramen bilaterally  Prior left laminectomy  There is no significant central canal stenosis  There is moderate foraminal   narrowing as a result of the endplate hypertrophic change      L5-S1:  Chronic L5 spondylolysis with grade 1 anterior spondylolisthesis  Mild diffuse annular bulging with mild foraminal endplate hypertrophic change and mild facet degenerative change   No significant central canal stenosis  Moderate bilateral   foraminal narrowing      PARASPINAL SOFT TISSUES:  Mild scarring within the right lung base posteriorly      IMPRESSION:     Prior right laminectomy at the L2-3 and L3-4 levels and left laminectomy at the L4-5 level  Persistent annular bulging and degenerative change as described above with mild to moderate canal stenosis and mild foraminal narrowing at these levels      At the L4-5 and L5-S1 levels there is annular bulging with endplate and facet changes resulting in moderate bilateral foraminal narrowing      Chronic L5 spondylolysis with grade 1 anterior spondylolisthesis    No MRI cervical spine results found in the past 2 years   MRI lumbar spine w wo contrast    Result Date: 9/15/2022  Impression     Postoperative change at L2-3 and L3-4 with prior laminectomies, new since prior examination  Improvement in the previously seen right-sided disc extrusion at L2-3  At L3-4 there is slight distortion of the right anterolateral aspect of the thecal sac due to a new small right paramedian disc extrusion  Stable postoperative change at L4-5 without canal stenosis  Stable mild foraminal narrowing  Stable chronic L5 spondylolysis and spondylolisthesis with mild bilateral foraminal narrowing  Workstation performed: [id number]          No MRI thoracic spine results found in the past 2 years   No X-ray cervical spine results found in the past 2 years   No X-ray lumbar spine results found in the past 2 years   No X-ray hip/pelvis results found in the past 2 years   No X-ray knee results found in the past 2 years         I have personally reviewed and/or updated the patient's past medical history, past surgical history, family history, social history, current medications, allergies, and vital signs today  Review of Systems   Musculoskeletal: Positive for back pain, gait problem and joint swelling          Buttocks       Patient Active Problem List   Diagnosis    Essential hypertension    BPH    History of bladder cancer    Right footdrop due to L3-L4 disc protrusion     Mahwah syndrome    Right foot drop    Anxiety    Primary insomnia    Left foot drop    Lumbar radiculopathy    Allergic rhinitis    Eustachian tube dysfunction    Chronic prostatitis    Hyperlipidemia    Erectile dysfunction    Arthritis of carpometacarpal (CMC) joint of both thumbs    Acute right-sided low back pain without sciatica    Skin lesion    Acute right-sided low back pain    Sepsis ([facility] )    Bacteremia due to Pseudomonas       Past Medical History:   Diagnosis Date    [facility] ([facility] )     [facility] 11/04/2019    Bladder 3-4 yrs ago    Chronic pain disorder     Dry skin dermatitis     Dyspepsia     last assessed 09/27/17    Hypertension        Past Surgical History:   Procedure Laterality Date    BACK SURGERY Left     left with foot drop after surgery    BLADDER SURGERY      COLONOSCOPY  2012    Wallace Jama results not known    COLONOSCOPY  05/2020    1 polyp hyperplastic/internal hemorrhoids    EGD  10/15/2015    Nonbleeding erosive gastropathy-biopsy normal   Antral erosions biopsy negative for H  pylori    EGD  09/08/2020    Moderately severe esophagitis otherwise normal   Biopsy esophagus showed acute Candida esophagitis negative for dysplasia or carcinoma negative for herpes simplex virus is negative for cytomegaly a virus  Biopsy stomach negative for H  pylori    EGD  05/2020    One millimeter gastric ulcer-biopsy negative for H  pylori    Erosive gastritis    EGD  09/08/2020    Gastric ulcer resolve the entire stomach normal   Moderate to severe esophagitis-biopsy positive for Candida    POSTERIOR LAMINECTOMY THORACIC AND LUMBAR SPINE Right 12/17/2018    Procedure: LAMINECTOMY LUMBAR LAMINECTOMY, DISCECTOMY, FORAMINOTOMY, L3-4, RIGHT;  Surgeon: Latrelle Mcardle, MD;  Location: [facility];  Service: Neurosurgery    NC LAMNOTMY INCL W/DCMPRSN NRV ROOT 1 INTRSPC LUMBR Right 11/12/2019    Procedure: LUMBAR LAMINOTOMY/FORAMINOTOMY/FACETECTOMY/DISCECTOMY L2-3, RIGHT;  Surgeon: Sergey Quiroga MD;  Location: [facility];  Service: Neurosurgery       Family History   Problem Relation Age of Onset    Stomach cancer Mother     Raynaud syndrome Mother     Heart attack Father     Heart attack Brother        Social History     Occupational History    Occupation: Full Time Employment   Tobacco Use    Smoking status: Never Smoker    Smokeless tobacco: Never Used   Substance and Sexual Activity    Alcohol use: Yes    Drug use: Yes     Types: Marijuana     Comment: rare    Sexual activity: Not Currently       Current Outpatient Medications on File Prior to Visit   Medication Sig    acetaminophen (TYLENOL) 650 mg CR tablet Take 650 mg by mouth every 6 (six) hours as needed for mild pain    amLODIPine (NORVASC) 10 mg tablet Take 1 tablet (10 mg total) by mouth daily    celecoxib (CeleBREX) 100 mg capsule Take 1 capsule (100 mg total) by mouth 2 (two) times a day    methocarbamol (ROBAXIN) 500 mg tablet Take 1 tablet (500 mg total) by mouth every 6 (six) hours as needed for muscle spasms for up to 10 days    oxyCODONE (ROXICODONE) 5 immediate release tablet Take 1 tablet (5 mg total) by mouth every 4 (four) hours as needed for severe pain for up to 10 days Max Daily Amount: 30 mg    tamsulosin (FLOMAX) 0 4 mg Take 1 capsule (0 4 mg total) by mouth daily    lidocaine (LIDODERM) 5 % Apply 1 patch topically daily for 10 days Remove & Discard patch within 12 hours or as directed by MD (Patient not taking: Reported on 9/29/2022)    [DISCONTINUED] magnesium oxide (MAG-OX) 400 mg Take 1 tablet (400 mg total) by mouth 2 (two) times a day (Patient not taking: No sig reported)     No current facility-administered medications on file prior to visit         Allergies   Allergen Reactions    Viagra [Sildenafil] Headache       Physical Exam    /88   Pulse 76   Ht 6' 3" (1 905 m) Comment: verbal  Wt 78 5 kg (173 lb)   BMI 21 62 kg/m²     Palpation:    tenderness over the left paravertebral musculature  tenderness over the right paravertebral musculature    No tenderness with palpation of the left SI joint  tenderness with palpation of the right SI joint    No tenderness with palpation of the left greater trochanter  No tenderness with palpation of the right greater trochanter    Sensory:    Intact to light touch grossly in the left lower extremity  Intact to light touch grossly in the right lower extremity  Patient has chronic  numbness in the anterior right lower leg status post prior surgeries  Muscle Strength      Hip flexion Knee flexion Knee extension Foot plantarflexion Foot   dorsiflexion EHL   L 5/5 5/5 5/5 5/5 0/5 5/5   R 5/5 5/5 5/5 5/5 0/5 5/5   Patient has bilateral footdrop from prior surgery at baseline  DTRs: 2+ patellar, 2+ Achilles and symmetric       Special Tests:  Negative straight leg test bilateral, negative facet loading bilaterally, negative Sidney's test bilateral

## 2022-09-28 NOTE — H&P (VIEW-ONLY)
Portions of the record may have been created with voice recognition software  Occasional wrong word or "sound a like" substitutions may have occurred due to the inherent limitations of voice recognition software  Read the chart carefully and recognize, using context, where substitutions have occurred  Assessment  1  Chronic right-sided low back pain with right-sided sciatica    2  Lumbar radiculopathy    3  Acute right-sided low back pain    4  Post laminectomy syndrome        Plan  No orders of the defined types were placed in this encounter  New Medications Ordered This Visit   Medications   • gabapentin (NEURONTIN) 300 mg capsule     Sig: Take 1 capsule (300 mg total) by mouth daily at bedtime     Dispense:  30 capsule     Refill:  1     This is a 60-year-old male with past surgical history of laminectomy at L2-L3, L3-L4 and L4-L5 who presents with worsening right-sided low back and right leg pain  The pain starts the right lower back and radiates to the anterior right lower leg  On physical exam, motor exam intact with chronic bilateral footdrop and decreased ankle dorsiflexion  Negative straight leg test bilaterally  Lumbar MRI imaging results noted below the showing mild-to-moderate neural foraminal narrowing throughout the lumbar spine with no severe canal stenosis noted  The etiology patient's pain presentation is likely multifactorial in the setting of post laminectomy syndrome with elements of neural foraminal narrowing but also likely contributory of myofascial pain and chronic pain syndrome     - dependent 300 mg nightly to help with the neuropathic component of the pain      - will plan for L5-S1 epidural steroid injection is setting of prior laminectomies at the higher lumbar region     - I had a detailed discussion with the patient regarding management of chronic low back pain and the importance to continue physical therapy and conservative management with adjuvant agents as tolerated  - patient noted taking oxycodone which provides relief  I explained to him that opioids are not indicated for this pain presentation and have serious adverse affects that limits the use longer-term  My impressions and treatment recommendations were discussed in detail with the patient who verbalized understanding and had no further questions  Discharge instructions were provided  I personally saw and examined the patient and I agree with the above discussed plan of care  History of Present Illness    Niko Vilchis is a 60 y o  male with past medical history of possible multilevel laminectomy diskectomy and foraminotomy at L2-L3, L3-L4 and L4-L5 as per recent lumbar imaging, bladder cancer, hypertension  Pt is referred to [facility] by Hasmukh Kumar PA-C  for further management of his chronic low back pain and pain syndrome  Today patient reports that over the past month he has had worsening severe 10/10 constant right-sided low back pain that radiates to the right lower extremity from the posterior lateral right thigh to anterior lower leg terminating at the ankle  He describes the pain as throbbing and states he has subjective weakness in the lower extremity  Patient has bilateral footdrop at baseline from prior surgeries  The pain is increased with standing  Patient reports no new onset numbness or motor weakness  In terms of management, patient is currently taking oxycodone, Celebrex, ibuprofen which is providing relief  He denies recent physical therapy secondary to pain  In terms of imaging, patient underwent a CT lumbar spine on 09/14/2022 and a lumbar MRI on 09/15/2022  Results showing mild to moderate foraminal narrowing throughout the lumbar spine but no significant canal stenosis    Prior right laminectomy at L2-L3 and L3-L4 noted prior laminectomy at left L4-L5 noted degenerative changes throughout the lumbar spine    Patient denies any bowel or bladder incontinence or saddle anesthesia at this time  No recent fevers chills or weight changes  Lab Results   Component Value Date    CREATININE 0 78 09/20/2022     Lab Results   Component Value Date    ALT 26 09/14/2022    ALT 33 08/26/2015         Imaging:  MRI LUMBAR SPINE WITH AND WITHOUT CONTRAST   9-     INDICATION: Intractable low back pain, history of previous laminectomies and lumbar decompression      COMPARISON:  10/9/2019     TECHNIQUE:  Sagittal T1, sagittal T2, sagittal inversion recovery, axial T1 and axial T2, coronal T2  Sagittal and axial T1 postcontrast      IV Contrast:  7 mL of Gadobutrol injection (SINGLE-DOSE)      IMAGE QUALITY:  Diagnostic     FINDINGS:     VERTEBRAL BODIES:  There are 5 lumbar type vertebral bodies  Slight dextroscoliosis of the lumbar spine  There is grade 1 anterior spondylolisthesis of L5 in relation to L4 and S1  Chronic bilateral L5 spondylolysis  Type II fatty endplate marrow   degenerative change is seen within the L3 superior endplate, as well as the L4 and L5 endplates adjacent to the L4-5 disc space      SACRUM:  Normal signal within the sacrum  No evidence of insufficiency or stress fracture      DISTAL CORD AND CONUS:  Normal size and signal within the distal cord and conus      PARASPINAL SOFT TISSUES:  Paraspinal soft tissues are unremarkable      LOWER THORACIC DISC SPACES:  Normal disc height and signal   No disc herniation, canal stenosis or foraminal narrowing      LUMBAR DISC SPACES:     L1-L2:  Normal ----     L2-L3:  Mild diffuse annular bulging small left foraminal disc protrusion  Prior right laminectomy with removal of the previously seen inferiorly extruded right-sided disc herniation  Mild facet arthropathy  Improved canal stenosis, only mild at this   time and mild foraminal narrowing      L3-L4:  Slight loss of disc height with mild diffuse annular bulging    There is a lobulated bilateral paramedian disc herniation  To the right of midline this is inferiorly extruded behind the superior endplate of L4  The patient has undergone a   previous right laminectomy  Mild residual canal stenosis with slight distortion of the anterolateral aspect of the thecal sac  Mild right greater than left foraminal narrowing      L4-L5:  Disc desiccation and loss of disc height  Mild diffuse annular bulging  Mild endplate hypertrophic change within the neural foramen  The patient has undergone a previous left laminectomy  No canal stenosis  Mild bilateral foraminal   narrowing, unchanged      L5-S1:  Grade 1 anterior spondylolisthesis with chronic L5 spondylolysis  Annular bulging with uncovering of the cephalad disc margin and mild facet degenerative change  There is no canal stenosis  Mild foraminal narrowing  No change      POSTCONTRAST IMAGING:  No abnormal enhancement      IMPRESSION:     Postoperative change at L2-3 and L3-4 with prior laminectomies, new since prior examination  Improvement in the previously seen right-sided disc extrusion at L2-3  At L3-4 there is slight distortion of the right anterolateral aspect of the thecal sac   due to a new small right paramedian disc extrusion      Stable postoperative change at L4-5 without canal stenosis  Stable mild foraminal narrowing      Stable chronic L5 spondylolysis and spondylolisthesis with mild bilateral foraminal narrowing  CT LUMBAR SPINE   9-     INDICATION:   Low back pain, cancer suspected  Low back pain, increased fracture risk  Low back pain, prior surgery, new symptoms  Right lower back pain, history of bladder history      COMPARISON: None      TECHNIQUE:  Contiguous axial images through the lumbar spine were obtained  Sagittal and coronal reconstructions were performed        Radiation dose length product (DLP) for this visit:  489 59 mGy-cm     This examination, like all CT scans performed in the [facility], was performed utilizing techniques to minimize radiation dose exposure, including the use of iterative   reconstruction and automated exposure control        IMAGE QUALITY:  Diagnostic      FINDINGS:     ALIGNMENT:  There are 5 lumbar type vertebral bodies  Grade 1 anterior spondylolisthesis of L5 upon S1 with chronic bilateral spondylolysis  No scoliosis      VERTEBRAL BODIES:  Mild osteopenia  No fracture  No lytic or destructive lesion  Mild anterior lateral endplate osteophyte formation within the visualized vertebral bodies  Posterior osteophytes noted at L3-4, L4-5      DEGENERATIVE CHANGES:     Lower Thoracic spine:  Minor facet degenerative change at T11-12  No disc herniation, canal stenosis or foraminal narrowing at the T11-12 or T12-L1 levels      L1-2:  Mild annular bulging  No disc herniation, canal stenosis or foraminal narrowing      L2-3:  Moderate annular bulging diffusely  Patient has undergone a previous right laminectomy with partial resection of the superior articular facet of L3  Mild to moderate canal stenosis with slight distortion of the thecal sac  Mild foraminal   narrowing      L3-4:  Slight loss of disc height  Annular bulging  As described above there is a small central and left paramedian endplate osteophyte arising from the L3 inferior endplate  The patient has undergone previous right laminectomy  Only mild canal stenosis   is present with mild foraminal narrowing      L4-5:  Loss of disc height  Annular bulging with circumferential endplate hypertrophic osteophyte formation extending into the neural foramen bilaterally  Prior left laminectomy  There is no significant central canal stenosis  There is moderate foraminal   narrowing as a result of the endplate hypertrophic change      L5-S1:  Chronic L5 spondylolysis with grade 1 anterior spondylolisthesis  Mild diffuse annular bulging with mild foraminal endplate hypertrophic change and mild facet degenerative change   No significant central canal stenosis  Moderate bilateral   foraminal narrowing      PARASPINAL SOFT TISSUES:  Mild scarring within the right lung base posteriorly      IMPRESSION:     Prior right laminectomy at the L2-3 and L3-4 levels and left laminectomy at the L4-5 level  Persistent annular bulging and degenerative change as described above with mild to moderate canal stenosis and mild foraminal narrowing at these levels      At the L4-5 and L5-S1 levels there is annular bulging with endplate and facet changes resulting in moderate bilateral foraminal narrowing      Chronic L5 spondylolysis with grade 1 anterior spondylolisthesis    No MRI cervical spine results found in the past 2 years   MRI lumbar spine w wo contrast    Result Date: 9/15/2022  Impression     Postoperative change at L2-3 and L3-4 with prior laminectomies, new since prior examination  Improvement in the previously seen right-sided disc extrusion at L2-3  At L3-4 there is slight distortion of the right anterolateral aspect of the thecal sac due to a new small right paramedian disc extrusion  Stable postoperative change at L4-5 without canal stenosis  Stable mild foraminal narrowing  Stable chronic L5 spondylolysis and spondylolisthesis with mild bilateral foraminal narrowing  Workstation performed: [id number]          No MRI thoracic spine results found in the past 2 years   No X-ray cervical spine results found in the past 2 years   No X-ray lumbar spine results found in the past 2 years   No X-ray hip/pelvis results found in the past 2 years   No X-ray knee results found in the past 2 years         I have personally reviewed and/or updated the patient's past medical history, past surgical history, family history, social history, current medications, allergies, and vital signs today  Review of Systems   Musculoskeletal: Positive for back pain, gait problem and joint swelling          Buttocks       Patient Active Problem List   Diagnosis   • Essential hypertension   • BPH   • History of bladder cancer   • Right footdrop due to L3-L4 disc protrusion    • Christine syndrome   • Right foot drop   • Anxiety   • Primary insomnia   • Left foot drop   • Lumbar radiculopathy   • Allergic rhinitis   • Eustachian tube dysfunction   • Chronic prostatitis   • Hyperlipidemia   • Erectile dysfunction   • Arthritis of carpometacarpal (CMC) joint of both thumbs   • Acute right-sided low back pain without sciatica   • Skin lesion   • Acute right-sided low back pain   • Sepsis ([facility] [address] )   • Bacteremia due to Pseudomonas       Past Medical History:   Diagnosis Date   • Cancer ([facility] )    • Cancer ([facility] ) 11/04/2019    Bladder 3-4 yrs ago   • Chronic pain disorder    • Dry skin dermatitis    • Dyspepsia     last assessed 09/27/17   • Hypertension        Past Surgical History:   Procedure Laterality Date   • BACK SURGERY Left     left with foot drop after surgery   • BLADDER SURGERY     • COLONOSCOPY  2012    Margaret Manning results not known   • COLONOSCOPY  05/2020    1 polyp hyperplastic/internal hemorrhoids   • EGD  10/15/2015    Nonbleeding erosive gastropathy-biopsy normal   Antral erosions biopsy negative for H  pylori   • EGD  09/08/2020    Moderately severe esophagitis otherwise normal   Biopsy esophagus showed acute Candida esophagitis negative for dysplasia or carcinoma negative for herpes simplex virus is negative for cytomegaly a virus  Biopsy stomach negative for H  pylori   • EGD  05/2020    One millimeter gastric ulcer-biopsy negative for H  pylori    Erosive gastritis   • EGD  09/08/2020    Gastric ulcer resolve the entire stomach normal   Moderate to severe esophagitis-biopsy positive for Candida   • POSTERIOR LAMINECTOMY THORACIC AND LUMBAR SPINE Right 12/17/2018    Procedure: LAMINECTOMY LUMBAR LAMINECTOMY, DISCECTOMY, FORAMINOTOMY, L3-4, RIGHT;  Surgeon: Lauren Bess MD;  Location: [facility];  Service: Neurosurgery   • CT LAMNOTMY INCL W/DCMPRSN NRV ROOT 1 INTRSPC LUMBR Right 11/12/2019    Procedure: LUMBAR LAMINOTOMY/FORAMINOTOMY/FACETECTOMY/DISCECTOMY L2-3, RIGHT;  Surgeon: Saeid Donovan MD;  Location:  MAIN OR;  Service: Neurosurgery       Family History   Problem Relation Age of Onset   • Stomach cancer Mother    • Raynaud syndrome Mother    • Heart attack Father    • Heart attack Brother        Social History     Occupational History   • Occupation: Full Time Employment   Tobacco Use   • Smoking status: Never Smoker   • Smokeless tobacco: Never Used   Substance and Sexual Activity   • Alcohol use: Yes   • Drug use: Yes     Types: Marijuana     Comment: rare   • Sexual activity: Not Currently       Current Outpatient Medications on File Prior to Visit   Medication Sig   • acetaminophen (TYLENOL) 650 mg CR tablet Take 650 mg by mouth every 6 (six) hours as needed for mild pain   • amLODIPine (NORVASC) 10 mg tablet Take 1 tablet (10 mg total) by mouth daily   • celecoxib (CeleBREX) 100 mg capsule Take 1 capsule (100 mg total) by mouth 2 (two) times a day   • methocarbamol (ROBAXIN) 500 mg tablet Take 1 tablet (500 mg total) by mouth every 6 (six) hours as needed for muscle spasms for up to 10 days   • oxyCODONE (ROXICODONE) 5 immediate release tablet Take 1 tablet (5 mg total) by mouth every 4 (four) hours as needed for severe pain for up to 10 days Max Daily Amount: 30 mg   • tamsulosin (FLOMAX) 0 4 mg Take 1 capsule (0 4 mg total) by mouth daily   • lidocaine (LIDODERM) 5 % Apply 1 patch topically daily for 10 days Remove & Discard patch within 12 hours or as directed by MD (Patient not taking: Reported on 9/29/2022)   • [DISCONTINUED] magnesium oxide (MAG-OX) 400 mg Take 1 tablet (400 mg total) by mouth 2 (two) times a day (Patient not taking: No sig reported)     No current facility-administered medications on file prior to visit         Allergies   Allergen Reactions   • Viagra [Sildenafil] Headache       Physical Exam    /88   Pulse 76   Ht 6' 3" (1 905 m) Comment: verbal  Wt 78 5 kg (173 lb)   BMI 21 62 kg/m²     Palpation:    tenderness over the left paravertebral musculature  tenderness over the right paravertebral musculature    No tenderness with palpation of the left SI joint  tenderness with palpation of the right SI joint    No tenderness with palpation of the left greater trochanter  No tenderness with palpation of the right greater trochanter    Sensory:    Intact to light touch grossly in the left lower extremity  Intact to light touch grossly in the right lower extremity  Patient has chronic  numbness in the anterior right lower leg status post prior surgeries  Muscle Strength      Hip flexion Knee flexion Knee extension Foot plantarflexion Foot   dorsiflexion EHL   L 5/5 5/5 5/5 5/5 0/5 5/5   R 5/5 5/5 5/5 5/5 0/5 5/5   Patient has bilateral footdrop from prior surgery at baseline  DTRs: 2+ patellar, 2+ Achilles and symmetric       Special Tests:  Negative straight leg test bilateral, negative facet loading bilaterally, negative Sidney's test bilateral

## 2022-09-29 ENCOUNTER — CONSULT (OUTPATIENT)
Dept: PAIN MEDICINE | Facility: CLINIC | Age: 62
End: 2022-09-29
Payer: COMMERCIAL

## 2022-09-29 VITALS
DIASTOLIC BLOOD PRESSURE: 88 MMHG | BODY MASS INDEX: 21.51 KG/M2 | HEART RATE: 76 BPM | HEIGHT: 75 IN | WEIGHT: 173 LBS | SYSTOLIC BLOOD PRESSURE: 152 MMHG

## 2022-09-29 DIAGNOSIS — M96.1 POST LAMINECTOMY SYNDROME: ICD-10-CM

## 2022-09-29 DIAGNOSIS — M54.41 CHRONIC RIGHT-SIDED LOW BACK PAIN WITH RIGHT-SIDED SCIATICA: Primary | ICD-10-CM

## 2022-09-29 DIAGNOSIS — G89.29 CHRONIC RIGHT-SIDED LOW BACK PAIN WITH RIGHT-SIDED SCIATICA: Primary | ICD-10-CM

## 2022-09-29 DIAGNOSIS — M54.16 LUMBAR RADICULOPATHY: ICD-10-CM

## 2022-09-29 DIAGNOSIS — M54.50 ACUTE RIGHT-SIDED LOW BACK PAIN: ICD-10-CM

## 2022-09-29 PROCEDURE — 99244 OFF/OP CNSLTJ NEW/EST MOD 40: CPT | Performed by: STUDENT IN AN ORGANIZED HEALTH CARE EDUCATION/TRAINING PROGRAM

## 2022-09-29 RX ORDER — GABAPENTIN 300 MG/1
300 CAPSULE ORAL
Qty: 30 CAPSULE | Refills: 1 | Status: SHIPPED | OUTPATIENT
Start: 2022-09-29

## 2022-09-30 ENCOUNTER — TELEPHONE (OUTPATIENT)
Dept: OBGYN CLINIC | Facility: MEDICAL CENTER | Age: 62
End: 2022-09-30

## 2022-09-30 DIAGNOSIS — M54.50 ACUTE RIGHT-SIDED LOW BACK PAIN, UNSPECIFIED WHETHER SCIATICA PRESENT: ICD-10-CM

## 2022-09-30 NOTE — TELEPHONE ENCOUNTER
Pt aware of procedure date and location  Pt was given procedure instructions and was notified that someone will call him the night before with procedure time  Understanding verbalized

## 2022-09-30 NOTE — TELEPHONE ENCOUNTER
Patient called request a refill on his methocarbamol to be sent to [address] [place] [address]      Requested Prescriptions     Pending Prescriptions Disp Refills    methocarbamol (ROBAXIN) 500 mg tablet 20 tablet 0     Sig: Take 1 tablet (500 mg total) by mouth every 6 (six) hours as needed for muscle spasms for up to 10 days     LOV 9/12/22, F/U 10/12/22, Labs completed

## 2022-10-01 RX ORDER — METHOCARBAMOL 500 MG/1
500 TABLET, FILM COATED ORAL EVERY 6 HOURS PRN
Qty: 20 TABLET | Refills: 0 | Status: SHIPPED | OUTPATIENT
Start: 2022-10-01 | End: 2022-10-10 | Stop reason: SDUPTHER

## 2022-10-10 DIAGNOSIS — M54.50 ACUTE RIGHT-SIDED LOW BACK PAIN, UNSPECIFIED WHETHER SCIATICA PRESENT: ICD-10-CM

## 2022-10-11 ENCOUNTER — HOSPITAL ENCOUNTER (OUTPATIENT)
Facility: HOSPITAL | Age: 62
Setting detail: OUTPATIENT SURGERY
Discharge: HOME | End: 2022-10-11
Attending: STUDENT IN AN ORGANIZED HEALTH CARE EDUCATION/TRAINING PROGRAM | Admitting: STUDENT IN AN ORGANIZED HEALTH CARE EDUCATION/TRAINING PROGRAM
Payer: COMMERCIAL

## 2022-10-11 VITALS
SYSTOLIC BLOOD PRESSURE: 179 MMHG | HEART RATE: 66 BPM | TEMPERATURE: 99.1 F | RESPIRATION RATE: 18 BRPM | OXYGEN SATURATION: 98 % | DIASTOLIC BLOOD PRESSURE: 90 MMHG

## 2022-10-11 PROCEDURE — 62323 NJX INTERLAMINAR LMBR/SAC: CPT | Performed by: STUDENT IN AN ORGANIZED HEALTH CARE EDUCATION/TRAINING PROGRAM

## 2022-10-11 RX ORDER — SODIUM CHLORIDE 9 MG/ML
INJECTION INTRAVENOUS AS NEEDED
Status: DISCONTINUED | OUTPATIENT
Start: 2022-10-11 | End: 2022-10-11 | Stop reason: HOSPADM

## 2022-10-11 RX ORDER — LIDOCAINE HYDROCHLORIDE 10 MG/ML
INJECTION, SOLUTION EPIDURAL; INFILTRATION; INTRACAUDAL; PERINEURAL AS NEEDED
Status: DISCONTINUED | OUTPATIENT
Start: 2022-10-11 | End: 2022-10-11 | Stop reason: HOSPADM

## 2022-10-11 RX ORDER — METHOCARBAMOL 500 MG/1
500 TABLET, FILM COATED ORAL EVERY 6 HOURS PRN
Qty: 60 TABLET | Refills: 0 | Status: SHIPPED | OUTPATIENT
Start: 2022-10-11

## 2022-10-11 RX ORDER — METHYLPREDNISOLONE ACETATE 80 MG/ML
INJECTION, SUSPENSION INTRA-ARTICULAR; INTRALESIONAL; INTRAMUSCULAR; SOFT TISSUE AS NEEDED
Status: DISCONTINUED | OUTPATIENT
Start: 2022-10-11 | End: 2022-10-11 | Stop reason: HOSPADM

## 2022-10-11 NOTE — OP NOTE
OPERATIVE REPORT  PATIENT NAME: Niko Vilchis    :  1960  MRN: [id number]  Pt Location: [facility] OR ROOM 01    SURGERY DATE: 10/11/2022    Surgeon(s) and Role:     * Jesse Yarbrough DO - Primary    Preop Diagnosis:  Spondylolisthesis at L5-S1 level [M43 17]    Post-Op Diagnosis Codes: * Spondylolisthesis at L5-S1 level [M43 17]    Procedure(s) (LRB):  LUMBAR EPIDURAL STEROID INJECTION L5-S1 (N/A)    Specimen(s):  * No specimens in log *    Estimated Blood Loss:   Minimal    Drains:  * No LDAs found *    Anesthesia Type:   Local    Operative Indications:  Spondylolisthesis at L5-S1 level [M43 17]      Operative Findings:      Complications:   None    Procedure and Technique:    DATE: 2022  PROCEDURE: Lumbar Epidural Steroid Injection under Fluoroscopy at L5-S1  PHYSICIAN: Juanita Freeman DO  PRE-OPERATIVE DIAGNOSIS: Lumbar radiculopathy, radiculitis, post laminectomy syndrome  POST-OPERATIVE DIAGNOSIS: Same  ANESTHESIA: local  COMPLICATIONS: None  Indications  Niko Vilchis is a 60 y o  male with a history of low back and leg pain who has failed conservative therapy and presents today seeking a more definitive interventional treatment  Informed Consent  The risks, benefits and alternatives of the procedure were discussed with the patient  The patient was given opportunity to ask questions regarding the procedure, its indications and the associated risks  The risks of the procedure discussed include but are not limited to infection, bleeding, headache, worsened pain, allergic reactions, nerve injuries, paralysis, susceptibility to COVID from steroids, and side effects  The patient showed understanding and wished to proceed  Informed consent was signed  Preparation  After obtaining informed consent, the patient's chart was reviewed, and the patient's identification was confirmed  The patient was brought to the Operating Room and placed in the prone position on the operating room table   Routine monitors were applied  A surgical timeout was performed per hospital policy  No skin lesions or signs of cellulitis were noted  Strict sterile technique was used  Skin was prepped with chloraprep solution and draped in sterile manner  Using an entry point in the space identified via fluoroscopy, the overlying skin was anesthetized with 1% lidocaine using a 25 gauge needle  Using a midline approach, a 20  gauge touhy needle was inserted through the anesthetized skin, and was advanced under fluoroscopic guidance  After contact with lamina, the needle was redirected until the ligamentum flavum was engaged at the L5-S1 interspace and the epidural space was entered by loss of resistance to air and saline  There were no noted parasthesias, and aspiration was negative for heme and CSF  Position of the needle in the epidural space was confirmed by fluoroscopy in both the AP and Lateral Positions  Omnipaque 240 contrast dye was then injected, and an appropriate epidurogram was observed with no vascular uptake  3 cc preservative free normal saline and 80 mg depo, was injected intermittently after negative heme and csf aspirations  There was no pain on injection  A Band-Aid dressing was applied  The patient tolerated the procedure well  Vital signs remained stable throughout  The post-operative exam did not reveal any new neurological deficits  Patient was sent to the recovery room in a stable condition  The patient was informed when to follow up post procedure  The patient was instructed to call with fever, chills, increased pain, redness or swelling at the injection site, or numbness or weakness in the leg           I was present for the entire procedure    Patient Disposition:  PACU         SIGNATURE: Shayne Armenta DO  DATE: October 11, 2022  TIME: 2:51 PM

## 2022-10-11 NOTE — INTERVAL H&P NOTE
H&P reviewed  After examining the patient I find no changes in the patients condition since the H&P had been written      Vitals:    10/11/22 1349   BP: (!) 179/90   Pulse: 66   Resp: 18   Temp:    SpO2: 98%

## 2022-10-11 NOTE — DISCHARGE INSTRUCTIONS
Epidural Steroid Injection   WHAT YOU NEED TO KNOW:   An epidural steroid injection (KYLE) is a procedure to inject steroid medicine into the epidural space  The epidural space is between your spinal cord and vertebrae  Steroids reduce inflammation and fluid buildup in your spine that may be causing pain  You may be given pain medicine along with the steroids  ACTIVITY  Do not drive or operate machinery today  No strenuous activity today - bending, lifting, etc   You may resume normal activites starting tomorrow - start slowly and as tolerated  You may shower today, but no tub baths or hot tubs  You may have numbness for several hours from the local anesthetic  Please use caution and common sense, especially with weight-bearing activities  CARE OF THE INJECTION SITE  If you have soreness or pain, apply ice to the area today (20 minutes on/20 minutes off)  Starting tomorrow, you may use warm, moist heat or ice if needed  You may have an increase or change in your discomfort for 36-48 hours after your treatment  Apply ice and continue with any pain medication you have been prescribed  Notify the [facility] if you have any of the following: redness, drainage, swelling, headache, stiff neck or fever above 100°F     SPECIAL INSTRUCTIONS  Our office will contact you in approximately 7 days for a progress report  MEDICATIONS  Continue to take all routine medications  Our office may have instructed you to hold some medications  As no general anesthesia was used in today's procedure, you should not experience any side effects related to anesthesia  If you are diabetic, the steroids used in today's injection may temporarily increase your blood sugar levels after the first few days after your injection  Please keep a close eye on your sugars and alert the doctor who manages your diabetes if your sugars are significantly high from your baseline or you are symptomatic       If you have a problem specifically related to your procedure, please call our office at [phone]  Problems not related to your procedure should be directed to your primary care physician

## 2022-10-12 ENCOUNTER — OFFICE VISIT (OUTPATIENT)
Dept: FAMILY MEDICINE CLINIC | Facility: CLINIC | Age: 62
End: 2022-10-12
Payer: COMMERCIAL

## 2022-10-12 VITALS
HEART RATE: 96 BPM | BODY MASS INDEX: 21.46 KG/M2 | TEMPERATURE: 96.4 F | HEIGHT: 75 IN | DIASTOLIC BLOOD PRESSURE: 98 MMHG | WEIGHT: 172.6 LBS | RESPIRATION RATE: 16 BRPM | SYSTOLIC BLOOD PRESSURE: 156 MMHG

## 2022-10-12 DIAGNOSIS — I10 ESSENTIAL HYPERTENSION: Primary | ICD-10-CM

## 2022-10-12 PROCEDURE — 99214 OFFICE O/P EST MOD 30 MIN: CPT | Performed by: NURSE PRACTITIONER

## 2022-10-12 NOTE — ASSESSMENT & PLAN NOTE
Patient blood pressure is still very poorly controlled  He has not started taking the amlodipine 10mg is only doing 5mg  Unable to evaluate effectiveness  Patient will need to return to office again for another BP check     If still elevated will consider adding losartan 25mg

## 2022-10-12 NOTE — PROGRESS NOTES
Name: Ab Dow      : 1960      MRN: [id number]  Encounter Provider: ABNER Montemayor  Encounter Date: 10/12/2022   Encounter department: [place] [address]  Essential hypertension  Assessment & Plan:  Patient blood pressure is still very poorly controlled  He has not started taking the amlodipine 10mg is only doing 5mg  Unable to evaluate effectiveness  Patient will need to return to office again for another BP check  If still elevated will consider adding losartan 25mg              Subjective      Had ESTIM placed on his right leg to help with pain  Had epidural injection yesterday  Reports the pain in his back and his leg is slowly improving  He is taking robaxin  Didn't do well with flexeril  He is on celebrex and 3 tylenol now to control his pain  He reports he was not taking the 10mg has only been doing 5 mg of amlodipine  Review of Systems   Constitutional: Negative for chills, fatigue and fever  Respiratory: Negative for cough, chest tightness and shortness of breath  Cardiovascular: Negative for chest pain, palpitations and leg swelling  Musculoskeletal: Positive for back pain  Neurological: Negative for dizziness and headaches  All other systems reviewed and are negative        Current Outpatient Medications on File Prior to Visit   Medication Sig   • acetaminophen (TYLENOL) 650 mg CR tablet Take 650 mg by mouth every 6 (six) hours as needed for mild pain   • amLODIPine (NORVASC) 10 mg tablet Take 1 tablet (10 mg total) by mouth daily   • celecoxib (CeleBREX) 100 mg capsule Take 1 capsule (100 mg total) by mouth 2 (two) times a day   • gabapentin (NEURONTIN) 300 mg capsule Take 1 capsule (300 mg total) by mouth daily at bedtime   • methocarbamol (ROBAXIN) 500 mg tablet Take 1 tablet (500 mg total) by mouth every 6 (six) hours as needed for muscle spasms   • tamsulosin (FLOMAX) 0 4 mg Take 1 capsule (0 4 mg total) by mouth daily   • lidocaine (LIDODERM) 5 % Apply 1 patch topically daily for 10 days Remove & Discard patch within 12 hours or as directed by MD (Patient not taking: Reported on 9/29/2022)   • [DISCONTINUED] magnesium oxide (MAG-OX) 400 mg Take 1 tablet (400 mg total) by mouth 2 (two) times a day (Patient not taking: No sig reported)       Objective     /98 (BP Location: Left arm, Patient Position: Sitting, Cuff Size: Adult)   Pulse 96   Temp (!) 96 4 °F (35 8 °C) (Temporal)   Resp 16   Ht 6' 3" (1 905 m)   Wt 78 3 kg (172 lb 9 6 oz)   BMI 21 57 kg/m²     Physical Exam  Vitals and nursing note reviewed  Constitutional:       General: He is not in acute distress  Appearance: Normal appearance  He is well-developed  He is not ill-appearing, toxic-appearing or diaphoretic  HENT:      Head: Normocephalic and atraumatic  Eyes:      Extraocular Movements: Extraocular movements intact  Conjunctiva/sclera: Conjunctivae normal       Pupils: Pupils are equal, round, and reactive to light  Pupils are equal    Cardiovascular:      Rate and Rhythm: Normal rate and regular rhythm  Heart sounds: S1 normal and S2 normal  No murmur heard  Pulmonary:      Effort: Pulmonary effort is normal  No respiratory distress  Breath sounds: Normal breath sounds  Musculoskeletal:      Right lower leg: No edema  Left lower leg: No edema  Neurological:      Mental Status: He is alert and oriented to person, place, and time  Psychiatric:         Mood and Affect: Mood normal          Behavior: Behavior normal          Thought Content:  Thought content normal          Judgment: Judgment normal        ABNER Salgado

## 2022-10-18 ENCOUNTER — EVALUATION (OUTPATIENT)
Dept: PHYSICAL THERAPY | Age: 62
End: 2022-10-18
Payer: COMMERCIAL

## 2022-10-18 DIAGNOSIS — M54.50 ACUTE RIGHT-SIDED LOW BACK PAIN, UNSPECIFIED WHETHER SCIATICA PRESENT: ICD-10-CM

## 2022-10-18 DIAGNOSIS — M54.16 LUMBAR RADICULOPATHY: Primary | ICD-10-CM

## 2022-10-18 PROCEDURE — 97162 PT EVAL MOD COMPLEX 30 MIN: CPT | Performed by: PHYSICAL THERAPIST

## 2022-10-18 PROCEDURE — 97110 THERAPEUTIC EXERCISES: CPT | Performed by: PHYSICAL THERAPIST

## 2022-10-18 NOTE — PROGRESS NOTES
PT Evaluation     Today's date: 10/18/2022  Patient name: Sidney Caraballo  : 1960  MRN: [id number]  Referring provider: Simone Orta MD  Dx:   Encounter Diagnosis     ICD-10-CM    1  Lumbar radiculopathy  M54 16    2  Acute right-sided low back pain, unspecified whether sciatica present  M54 50 Ambulatory referral to Physical Therapy       Start Time: 1015  Stop Time: 1100  Total time in clinic (min): 45 minutes    Assessment  Assessment details: Pt is a 61 y/o male who presents with chronic low back pain  No further referral is necessary at this time  Pt has a movement impairment diagnosis of stabilization category  representing a pathoantomical diagnosis of lumbar radiculopathy  Pt is experiencing pain, decreased strength, and decreased ROM  Pt has a positive prognosis  Pt would benefit from PT to address these impairments leading to increased functional capacity and improved quality of life  Pt elects for HEP and will be discharged  Impairments: abnormal or restricted ROM, impaired physical strength, lacks appropriate home exercise program, pain with function, poor posture  and poor body mechanics  Understanding of Dx/Px/POC: good   Prognosis: good    Goals  Pt elects for HEP    Plan  Patient would benefit from: skilled physical therapy  Planned therapy interventions: home exercise program  Frequency: D/C to HEP    Treatment plan discussed with: patient        Subjective Evaluation    History of Present Illness  Mechanism of injury: Pt has chronic LBP but has been improving, would like a snigle visit to establish HEP  Quality of life: good    Pain  Current pain ratin  At best pain ratin  At worst pain ratin  Quality: sharp, radiating and dull ache  Aggravating factors: stair climbing, standing, walking, lifting and running    Hand dominance: right    Patient Goals  Patient goals for therapy: decreased pain, independence with ADLs/IADLs, return to sport/leisure activities, increased strength and increased motion          Objective     Concurrent Complaints  Negative for night pain, disturbed sleep, bladder dysfunction, bowel dysfunction, saddle (S4) numbness, cardiac problem, kidney problem, gallbladder problem, stomach problem, ulcer, appendix problem, spleen problem, pancreas problem, history of cancer, history of trauma and infection    Neurological Testing     Sensation     Lumbar   Left   Intact: light touch    Right   Intact: light touch    Reflexes   Left   Patellar (L4): absent (0)  Clonus sign: negative    Right   Patellar (L4): absent (0)  Clonus sign: negative    Active Range of Motion     Lumbar   Flexion:  Restriction level: moderate  Extension:  Restriction level: moderate  Left lateral flexion:  Restriction level: moderate  Right lateral flexion:  Restriction level: moderate             Precautions: Multiple Lumbar surgeries, HTN       Manuals 10/18                                                                Neuro Re-Ed                                                                                                        Ther Ex             Open books             SLR abd + flex             PPT             Mini squats             STS                                                    Ther Activity                                       Gait Training                                       Modalities

## 2022-10-27 ENCOUNTER — VBI (OUTPATIENT)
Dept: ADMINISTRATIVE | Facility: OTHER | Age: 62
End: 2022-10-27

## 2022-11-03 ENCOUNTER — OFFICE VISIT (OUTPATIENT)
Dept: FAMILY MEDICINE CLINIC | Facility: CLINIC | Age: 62
End: 2022-11-03

## 2022-11-03 VITALS
HEART RATE: 75 BPM | SYSTOLIC BLOOD PRESSURE: 136 MMHG | BODY MASS INDEX: 21.39 KG/M2 | WEIGHT: 172 LBS | OXYGEN SATURATION: 91 % | DIASTOLIC BLOOD PRESSURE: 78 MMHG | HEIGHT: 75 IN

## 2022-11-03 DIAGNOSIS — R35.0 URINARY FREQUENCY: Primary | ICD-10-CM

## 2022-11-03 DIAGNOSIS — I10 ESSENTIAL HYPERTENSION: ICD-10-CM

## 2022-11-03 DIAGNOSIS — M18.0 ARTHRITIS OF CARPOMETACARPAL (CMC) JOINT OF BOTH THUMBS: ICD-10-CM

## 2022-11-03 LAB
SL AMB  POCT GLUCOSE, UA: NEGATIVE
SL AMB LEUKOCYTE ESTERASE,UA: NEGATIVE
SL AMB POCT BILIRUBIN,UA: NEGATIVE
SL AMB POCT BLOOD,UA: NEGATIVE
SL AMB POCT CLARITY,UA: CLEAR
SL AMB POCT COLOR,UA: YELLOW
SL AMB POCT KETONES,UA: NEGATIVE
SL AMB POCT NITRITE,UA: NEGATIVE
SL AMB POCT PH,UA: 5.5
SL AMB POCT SPECIFIC GRAVITY,UA: 1.02
SL AMB POCT URINE PROTEIN: NEGATIVE
SL AMB POCT UROBILINOGEN: 0.2

## 2022-11-03 RX ORDER — AMLODIPINE BESYLATE 10 MG/1
10 TABLET ORAL DAILY
Qty: 90 TABLET | Refills: 1 | Status: SHIPPED | OUTPATIENT
Start: 2022-11-03

## 2022-11-03 RX ORDER — CIPROFLOXACIN 500 MG/1
500 TABLET, FILM COATED ORAL 2 TIMES DAILY
COMMUNITY
Start: 2022-10-28

## 2022-11-03 RX ORDER — CELECOXIB 100 MG/1
100 CAPSULE ORAL 2 TIMES DAILY
Qty: 180 CAPSULE | Refills: 1 | Status: SHIPPED | OUTPATIENT
Start: 2022-11-03

## 2022-11-03 NOTE — PROGRESS NOTES
Name: Yamilet May      : 1960      MRN: [id number]  Encounter Provider: ABNER Reed  Encounter Date: 11/3/2022   Encounter department: [facility]    Assessment & Plan     1  Urinary frequency  Comments:  on flomax  in office UA was normal  follows with urology   Orders:  -     POCT urine dip    2  Arthritis of carpometacarpal (CMC) joint of both thumbs  Assessment & Plan:  Celebrex stable symptoms ;continue this     Orders:  -     celecoxib (CeleBREX) 100 mg capsule; Take 1 capsule (100 mg total) by mouth 2 (two) times a day    3  Essential hypertension  Assessment & Plan:  Patient blood pressure is doing better today  Continue with amlodipine 10mg  Orders:  -     amLODIPine (NORVASC) 10 mg tablet; Take 1 tablet (10 mg total) by mouth daily           Subjective      Patient presents today for blood pressure follow up    patient reports he had a UTI after not taking antibiotic after bladder scope  He states his significant other always gets UTI  yesterday he states he had sex without a condom and states he felt poorly  He took cipro yesterday and this morning he felt poorly  He states he has some psychological issues with his current relationship  He states he didn't drive [employer] this morning so he was able to relax before his appointment  He doubled up on the amlodipine as instructed  Review of Systems   Constitutional: Positive for fatigue  Genitourinary: Negative  Musculoskeletal: Positive for back pain  Neurological: Negative for headaches         Current Outpatient Medications on File Prior to Visit   Medication Sig   • acetaminophen (TYLENOL) 650 mg CR tablet Take 650 mg by mouth every 6 (six) hours as needed for mild pain   • ciprofloxacin (CIPRO) 500 mg tablet Take 500 mg by mouth 2 (two) times a day   • gabapentin (NEURONTIN) 300 mg capsule Take 1 capsule (300 mg total) by mouth daily at bedtime   • methocarbamol (ROBAXIN) 500 mg tablet Take 1 tablet (500 mg total) by mouth every 6 (six) hours as needed for muscle spasms   • tamsulosin (FLOMAX) 0 4 mg Take 1 capsule (0 4 mg total) by mouth daily   • [DISCONTINUED] amLODIPine (NORVASC) 10 mg tablet Take 1 tablet (10 mg total) by mouth daily   • [DISCONTINUED] celecoxib (CeleBREX) 100 mg capsule Take 1 capsule (100 mg total) by mouth 2 (two) times a day   • lidocaine (LIDODERM) 5 % Apply 1 patch topically daily for 10 days Remove & Discard patch within 12 hours or as directed by MD (Patient not taking: Reported on 9/29/2022)   • [DISCONTINUED] magnesium oxide (MAG-OX) 400 mg Take 1 tablet (400 mg total) by mouth 2 (two) times a day (Patient not taking: No sig reported)       Objective     /78 (BP Location: Left arm, Patient Position: Sitting, Cuff Size: Large)   Pulse 75   Ht 6' 3" (1 905 m)   Wt 78 kg (172 lb)   SpO2 91%   BMI 21 50 kg/m²     Physical Exam  Vitals and nursing note reviewed  Constitutional:       Appearance: Normal appearance  He is well-developed  He is not ill-appearing  HENT:      Head: Normocephalic and atraumatic  Eyes:      Extraocular Movements: Extraocular movements intact  Conjunctiva/sclera: Conjunctivae normal       Pupils: Pupils are equal    Cardiovascular:      Rate and Rhythm: Normal rate and regular rhythm  Heart sounds: S1 normal and S2 normal  No murmur heard  Pulmonary:      Effort: Pulmonary effort is normal  No respiratory distress  Breath sounds: Normal breath sounds  Musculoskeletal:      Right lower leg: No edema  Left lower leg: No edema  Neurological:      Mental Status: He is alert and oriented to person, place, and time  Psychiatric:         Mood and Affect: Mood normal          Thought Content:  Thought content normal        Maye Alfaro

## 2022-11-19 PROBLEM — A41.9 SEPSIS (HCC): Status: RESOLVED | Noted: 2022-09-16 | Resolved: 2022-11-19

## 2022-12-02 ENCOUNTER — HOSPITAL ENCOUNTER (OUTPATIENT)
Dept: RADIOLOGY | Facility: HOSPITAL | Age: 62
Discharge: HOME | End: 2022-12-02
Attending: STUDENT IN AN ORGANIZED HEALTH CARE EDUCATION/TRAINING PROGRAM

## 2022-12-02 DIAGNOSIS — M43.17 SPONDYLOLISTHESIS AT L5-S1 LEVEL: ICD-10-CM

## 2022-12-19 ENCOUNTER — VBI (OUTPATIENT)
Dept: ADMINISTRATIVE | Facility: OTHER | Age: 62
End: 2022-12-19

## 2022-12-23 ENCOUNTER — TELEPHONE (OUTPATIENT)
Dept: NEUROSURGERY | Facility: CLINIC | Age: 62
End: 2022-12-23

## 2023-01-18 ENCOUNTER — OFFICE VISIT (OUTPATIENT)
Dept: FAMILY MEDICINE CLINIC | Facility: CLINIC | Age: 63
End: 2023-01-18

## 2023-01-18 VITALS
HEART RATE: 64 BPM | HEIGHT: 75 IN | WEIGHT: 182.2 LBS | DIASTOLIC BLOOD PRESSURE: 80 MMHG | BODY MASS INDEX: 22.65 KG/M2 | SYSTOLIC BLOOD PRESSURE: 140 MMHG | RESPIRATION RATE: 16 BRPM | TEMPERATURE: 97.7 F

## 2023-01-18 DIAGNOSIS — N41.1 CHRONIC PROSTATITIS: ICD-10-CM

## 2023-01-18 DIAGNOSIS — I10 ESSENTIAL HYPERTENSION: Primary | ICD-10-CM

## 2023-01-18 DIAGNOSIS — L98.9 SKIN LESION: ICD-10-CM

## 2023-01-18 DIAGNOSIS — F41.9 ANXIETY: ICD-10-CM

## 2023-01-18 LAB
BILIRUB UR QL STRIP: NEGATIVE
CLARITY UR: CLEAR
COLOR UR: NORMAL
GLUCOSE UR STRIP-MCNC: NEGATIVE MG/DL
HGB UR QL STRIP.AUTO: NEGATIVE
KETONES UR STRIP-MCNC: NEGATIVE MG/DL
LEUKOCYTE ESTERASE UR QL STRIP: NEGATIVE
NITRITE UR QL STRIP: NEGATIVE
PH UR STRIP.AUTO: 5.5 [PH]
PROT UR STRIP-MCNC: NEGATIVE MG/DL
SP GR UR STRIP.AUTO: 1.01 (ref 1–1.03)
UROBILINOGEN UR STRIP-ACNC: <2 MG/DL

## 2023-01-18 NOTE — PROGRESS NOTES
Name: Karina Urbina      : 1960      MRN: [id number]  Encounter Provider: ABNER Stapleton  Encounter Date: 2023   Encounter department: [place] [address]  Essential hypertension  Assessment & Plan:  Patient should return to taking amlodipine 10mg  2  Skin lesion  Assessment & Plan:  Advise since lesion is tender should have evaluated  By dermatology and to reschedule missed appointment  3  Anxiety  Assessment & Plan:  Taking amytryptyline as needed  Subjective      Patient has had been having some left thigh tingling but its better today  He has left thigh lesion that has been present a while  It is tender to touch now  Had derm appt but missed it when he had injured his back  He gained 10 pounds but has been exercising his back is   He states after driving from work he had swelling in his legs so he has been watching his salt intake  He is here for bp recheck he was running low so he started taking 1 pill instead  Is taking one pill even now  He has amitriptyline which he took last night it helps   He taken pantoprazole takes it as needed  He also started lactaid pills because he thinks he might be lactose intolerant  This has been helping  Review of Systems   Constitutional: Negative for chills and fever  HENT: Negative for ear pain and sore throat  Eyes: Negative for pain and visual disturbance  Respiratory: Negative for cough and shortness of breath  Cardiovascular: Negative for chest pain and palpitations  Gastrointestinal: Negative for abdominal pain and vomiting  Genitourinary: Negative for dysuria and hematuria  Musculoskeletal: Negative for arthralgias and back pain  Skin: Negative for color change and rash  Neurological: Negative for seizures and syncope  All other systems reviewed and are negative        Current Outpatient Medications on File Prior to Visit   Medication Sig   • acetaminophen (TYLENOL) 650 mg CR tablet Take 650 mg by mouth every 6 (six) hours as needed for mild pain   • amLODIPine (NORVASC) 10 mg tablet Take 1 tablet (10 mg total) by mouth daily   • celecoxib (CeleBREX) 100 mg capsule Take 1 capsule (100 mg total) by mouth 2 (two) times a day   • ciprofloxacin (CIPRO) 500 mg tablet Take 500 mg by mouth 2 (two) times a day   • gabapentin (NEURONTIN) 300 mg capsule Take 1 capsule (300 mg total) by mouth daily at bedtime   • methocarbamol (ROBAXIN) 500 mg tablet Take 1 tablet (500 mg total) by mouth every 6 (six) hours as needed for muscle spasms   • tamsulosin (FLOMAX) 0 4 mg Take 1 capsule (0 4 mg total) by mouth daily   • lidocaine (LIDODERM) 5 % Apply 1 patch topically daily for 10 days Remove & Discard patch within 12 hours or as directed by MD (Patient not taking: Reported on 9/29/2022)   • [DISCONTINUED] magnesium oxide (MAG-OX) 400 mg Take 1 tablet (400 mg total) by mouth 2 (two) times a day (Patient not taking: No sig reported)       Objective     /80   Pulse 64   Temp 97 7 °F (36 5 °C) (Temporal)   Resp 16   Ht 6' 3" (1 905 m)   Wt 82 6 kg (182 lb 3 2 oz)   BMI 22 77 kg/m²     Physical Exam  Vitals and nursing note reviewed  Constitutional:       Appearance: Normal appearance  He is well-developed  He is not ill-appearing  HENT:      Head: Normocephalic and atraumatic  Eyes:      Extraocular Movements: Extraocular movements intact  Conjunctiva/sclera: Conjunctivae normal       Pupils: Pupils are equal    Cardiovascular:      Rate and Rhythm: Normal rate and regular rhythm  Heart sounds: S1 normal and S2 normal  No murmur heard  Pulmonary:      Effort: Pulmonary effort is normal  No respiratory distress  Breath sounds: Normal breath sounds  Musculoskeletal:      Right lower leg: No edema  Left lower leg: No edema  Skin:     Findings: Lesion (upper left thigh, flesh mole ) present     Neurological:      Mental Status: He is alert and oriented to person, place, and time  Psychiatric:         Mood and Affect: Mood normal          Thought Content:  Thought content normal        Afsaneh Heredia

## 2023-05-05 DIAGNOSIS — I10 ESSENTIAL HYPERTENSION: ICD-10-CM

## 2023-05-05 RX ORDER — AMLODIPINE BESYLATE 10 MG/1
10 TABLET ORAL DAILY
Qty: 90 TABLET | Refills: 1 | Status: SHIPPED | OUTPATIENT
Start: 2023-05-05

## 2023-05-05 NOTE — TELEPHONE ENCOUNTER
Randy Dickens, [phone]  I need to refill my amolipine  My PA is Hussein [place]  Thank you

## 2023-08-16 DIAGNOSIS — M18.0 ARTHRITIS OF CARPOMETACARPAL (CMC) JOINT OF BOTH THUMBS: ICD-10-CM

## 2023-08-16 RX ORDER — CELECOXIB 100 MG/1
100 CAPSULE ORAL 2 TIMES DAILY
Qty: 180 CAPSULE | Refills: 1 | Status: SHIPPED | OUTPATIENT
Start: 2023-08-16

## 2023-11-07 DIAGNOSIS — M18.0 ARTHRITIS OF CARPOMETACARPAL (CMC) JOINT OF BOTH THUMBS: ICD-10-CM

## 2023-11-20 RX ORDER — CELECOXIB 100 MG/1
100 CAPSULE ORAL 2 TIMES DAILY
Qty: 180 CAPSULE | Refills: 1 | Status: SHIPPED | OUTPATIENT
Start: 2023-11-20

## (undated) DEVICE — LIGHT HANDLE COVER SLEEVE DISP BLUE STELLAR

## (undated) DEVICE — NEEDLE HYPO 22G X 1-1/2 IN

## (undated) DEVICE — PROXIMATE PLUS MD MULTI-DIRECTIONAL RELEASE SKIN STAPLERS CONTAINS 35 STAINLESS STEEL STAPLES APPROXIMATE CLOSED DIMENSIONS: 6.9MM X 3.9MM WIDE: Brand: PROXIMATE

## (undated) DEVICE — FLOSEAL HEMOSTATIC MATRIX, 5 ML: Brand: FLOSEAL

## (undated) DEVICE — DRAPE MICROSCOPE OPMI PENTERO

## (undated) DEVICE — SNAP KOVER: Brand: UNBRANDED

## (undated) DEVICE — FLOSEAL HEMOSTATIC MATRIX, 10 ML: Brand: FLOSEAL

## (undated) DEVICE — SPONGE PVP SCRUB WING STERILE

## (undated) DEVICE — PREP SURGICAL PURPREP 26ML

## (undated) DEVICE — GLOVE INDICATOR PI UNDERGLOVE SZ 7.5 BLUE

## (undated) DEVICE — TELFA NON-ADHERENT ABSORBENT DRESSING: Brand: TELFA

## (undated) DEVICE — INTENDED FOR TISSUE SEPARATION, AND OTHER PROCEDURES THAT REQUIRE A SHARP SURGICAL BLADE TO PUNCTURE OR CUT.: Brand: BARD-PARKER SAFETY BLADES SIZE 10, STERILE

## (undated) DEVICE — ELECTRODE BLADE MOD E-Z CLEAN 2.5IN 6.4CM -0012M

## (undated) DEVICE — ADHESIVE SKIN HIGH VISCOSITY EXOFIN 1ML

## (undated) DEVICE — CHLORASCRUB PREP 1ML

## (undated) DEVICE — TOOL 14MH30 LEGEND 14CM 3MM: Brand: MIDAS REX ™

## (undated) DEVICE — BETHLEHEM UNIVERSAL SPINE, KIT: Brand: CARDINAL HEALTH

## (undated) DEVICE — IV EXTENSION TUBING SMALL BORE

## (undated) DEVICE — ANTIBACTERIAL VIOLET BRAIDED (POLYGLACTIN 910), SYNTHETIC ABSORBABLE SUTURE: Brand: COATED VICRYL

## (undated) DEVICE — GLOVE SRG BIOGEL ECLIPSE 7

## (undated) DEVICE — 3M™ TEGADERM™ TRANSPARENT FILM DRESSING FRAME STYLE, 1626W, 4 IN X 4-3/4 IN (10 CM X 12 CM), 50/CT 4CT/CASE: Brand: 3M™ TEGADERM™

## (undated) DEVICE — TRAY EPIDURAL PERIFIX 20GA X 3.5IN TUOHY 8ML

## (undated) DEVICE — TIBURON SPLIT SHEET: Brand: CONVERTORS

## (undated) DEVICE — SUT PROLENE 3-0 PC-5 18 IN 8632G

## (undated) DEVICE — GLOVE SRG BIOGEL 6.5

## (undated) DEVICE — SYRINGE 3ML LL

## (undated) DEVICE — NEEDLE 18 G X 1 1/2 SAFETY

## (undated) DEVICE — SUT ETHILON 3-0 PS-1 18 IN 1663G

## (undated) DEVICE — SYRINGE 1ML TB 25G X 5/8 NON SAFETY

## (undated) DEVICE — INTENDED FOR TISSUE SEPARATION, AND OTHER PROCEDURES THAT REQUIRE A SHARP SURGICAL BLADE TO PUNCTURE OR CUT.: Brand: BARD-PARKER ® CARBON RIB-BACK BLADES

## (undated) DEVICE — DRAPE EQUIPMENT RF WAND

## (undated) DEVICE — GLOVE SRG BIOGEL ECLIPSE 6.5

## (undated) DEVICE — PLASTIC ADHESIVE BANDAGE: Brand: CURITY

## (undated) DEVICE — PENCIL ELECTROSURG E-Z CLEAN -0035H

## (undated) DEVICE — SYRINGE 5ML LL

## (undated) DEVICE — SUT MONOCRYL 4-0 PS-2 27 IN Y426H

## (undated) DEVICE — MINOR PROCEDURE DRAPE: Brand: CONVERTORS

## (undated) DEVICE — SPECIMEN CONTAINER: Brand: CARDINAL HEALTH